# Patient Record
Sex: FEMALE | Race: WHITE | NOT HISPANIC OR LATINO | URBAN - METROPOLITAN AREA
[De-identification: names, ages, dates, MRNs, and addresses within clinical notes are randomized per-mention and may not be internally consistent; named-entity substitution may affect disease eponyms.]

---

## 2021-04-27 ENCOUNTER — OUTPATIENT (OUTPATIENT)
Dept: OUTPATIENT SERVICES | Facility: HOSPITAL | Age: 71
LOS: 1 days | Discharge: HOME | End: 2021-04-27
Payer: MEDICARE

## 2021-04-27 DIAGNOSIS — R07.9 CHEST PAIN, UNSPECIFIED: ICD-10-CM

## 2021-04-27 PROCEDURE — 78452 HT MUSCLE IMAGE SPECT MULT: CPT | Mod: 26

## 2021-06-17 ENCOUNTER — OUTPATIENT (OUTPATIENT)
Dept: OUTPATIENT SERVICES | Facility: HOSPITAL | Age: 71
LOS: 1 days | Discharge: HOME | End: 2021-06-17
Payer: MEDICARE

## 2021-06-17 DIAGNOSIS — N63.10 UNSPECIFIED LUMP IN THE RIGHT BREAST, UNSPECIFIED QUADRANT: ICD-10-CM

## 2021-06-17 DIAGNOSIS — N63.20 UNSPECIFIED LUMP IN THE LEFT BREAST, UNSPECIFIED QUADRANT: ICD-10-CM

## 2021-06-17 PROCEDURE — G0279: CPT | Mod: 26

## 2021-06-17 PROCEDURE — 77066 DX MAMMO INCL CAD BI: CPT | Mod: 26

## 2021-06-17 PROCEDURE — 76641 ULTRASOUND BREAST COMPLETE: CPT | Mod: 26,50

## 2021-07-21 ENCOUNTER — RESULT REVIEW (OUTPATIENT)
Age: 71
End: 2021-07-21

## 2021-07-21 ENCOUNTER — OUTPATIENT (OUTPATIENT)
Dept: OUTPATIENT SERVICES | Facility: HOSPITAL | Age: 71
LOS: 1 days | Discharge: HOME | End: 2021-07-21
Payer: COMMERCIAL

## 2021-07-21 DIAGNOSIS — D05.11 INTRADUCTAL CARCINOMA IN SITU OF RIGHT BREAST: ICD-10-CM

## 2021-07-21 DIAGNOSIS — N60.01 SOLITARY CYST OF RIGHT BREAST: ICD-10-CM

## 2021-07-21 PROCEDURE — 88360 TUMOR IMMUNOHISTOCHEM/MANUAL: CPT | Mod: 26

## 2021-07-21 PROCEDURE — 19084 BX BREAST ADD LESION US IMAG: CPT | Mod: 59,RT

## 2021-07-21 PROCEDURE — 88305 TISSUE EXAM BY PATHOLOGIST: CPT | Mod: 26

## 2021-07-21 PROCEDURE — 19083 BX BREAST 1ST LESION US IMAG: CPT | Mod: RT

## 2021-07-21 PROCEDURE — 77065 DX MAMMO INCL CAD UNI: CPT | Mod: 26,RT

## 2021-07-22 LAB — SURGICAL PATHOLOGY STUDY: SIGNIFICANT CHANGE UP

## 2021-07-23 DIAGNOSIS — N63.10 UNSPECIFIED LUMP IN THE RIGHT BREAST, UNSPECIFIED QUADRANT: ICD-10-CM

## 2021-08-02 ENCOUNTER — APPOINTMENT (OUTPATIENT)
Dept: BREAST CENTER | Facility: CLINIC | Age: 71
End: 2021-08-02
Payer: MEDICARE

## 2021-08-02 VITALS
HEIGHT: 62 IN | SYSTOLIC BLOOD PRESSURE: 140 MMHG | WEIGHT: 124 LBS | TEMPERATURE: 97.2 F | DIASTOLIC BLOOD PRESSURE: 86 MMHG | BODY MASS INDEX: 22.82 KG/M2

## 2021-08-02 DIAGNOSIS — Z86.79 PERSONAL HISTORY OF OTHER DISEASES OF THE CIRCULATORY SYSTEM: ICD-10-CM

## 2021-08-02 DIAGNOSIS — Z80.3 FAMILY HISTORY OF MALIGNANT NEOPLASM OF BREAST: ICD-10-CM

## 2021-08-02 PROCEDURE — 99204 OFFICE O/P NEW MOD 45 MIN: CPT

## 2021-08-03 PROBLEM — Z86.79 HISTORY OF HEART VALVE ABNORMALITY: Status: RESOLVED | Noted: 2021-08-03 | Resolved: 2021-08-03

## 2021-08-03 PROBLEM — Z86.79 HISTORY OF HYPERTENSION: Status: RESOLVED | Noted: 2021-08-03 | Resolved: 2021-08-03

## 2021-08-03 PROBLEM — Z80.3 FAMILY HISTORY OF BREAST CANCER: Status: ACTIVE | Noted: 2021-08-03

## 2021-08-03 NOTE — PAST MEDICAL HISTORY
[Menarche Age ____] : age at menarche was [unfilled] [Menopause Age____] : age at menopause was [unfilled] [Total Preg ___] : G[unfilled] [Live Births ___] : P[unfilled]  [Age At Live Birth ___] : Age at live birth: [unfilled] [History of Hormone Replacement Treatment] : has no history of hormone replacement treatment [FreeTextEntry5] : s/p hysterectomy and unilateral oophorectomy in 1988  [FreeTextEntry6] : denies [FreeTextEntry8] : denies  [FreeTextEntry7] : denies

## 2021-08-03 NOTE — ASSESSMENT
[FreeTextEntry1] : JOSE FRANCISCO THOMAS is a 70 y/o post menopausal F who presents with a self palpated RIGHT breast cancer, [id number], ER/ND pos, Her 2 neg, stage IIB AJCC 8th edition. \par \par On exam, in her right breast, she had a 3 cm mass in the UOQ, no overlying skin changes; resolving ecchymoses from her recent biopsies, no other suspicious breast masses palpated; lymphadenopathy palpated, a 2 cm lymph node that was mobile; no suspicious abnormalities were noted within the left breast. \par \par Her most recent imaging was a b/l dx mammogram and US on 6/17/2021 which revealed the following RIGHT breast findings: \par - In the axilla, @10-11N12, cortically thickened axillary lymph node measuring 2.2 cm --> METASTATIC CARCINOMA\par -C/W palpable concern @10-11N5-8, spiculated mass with associated coarse calcifications, measures 2.7 x 1.2 x 1.8 cm --> IDC\par -@11N10, circumscribed hypoechoic mass, measures 1.3 x 1.2 x 0.4 cm, --> DILATED DUCT\par -@6N5, circumscribed hypoechoic mass measuring 0.3 x 0.3 x 0.2 cm\par -@6N4, circumscribed hypoechoic mass measuring 0.6 x 0.6 x 0.2 cm --> hyalinized fibroadenoma\par \par We discussed fibroadenomas.  These are benign lesions without any malignant potential.  They are hormonally influenced and can increase or decrease in size and can also regress spontaneously.  They are considered proliferative lesions without atypia.  Patients with these lesions have been found to have a slightly increased relative risk of breast cancer compared to the reference population.  Surgical excision is recommended when the diagnosis in unclear, the lesion is causing pain or breast deformity, or if it is rapidly enlarging. \par \par She is currently asymptomatic from her right breast fibroadenoma, so no intervention is indicated for this. \par \par We discussed her new diagnosis of stage IIB RIGHT breast invasive ductal carcinoma (IDC). \par \par To further delineate the extent of her disease, I would like to obtain a bilateral breast MRI.  \par \par She may need systemic chemotherapy given the size of the tumor and positive lymph node.  After speaking with the medical oncologist, we will have her biopsy specimen sent for an ONCOTYPE to determine if she would benefit from chemotherapy.  She will be referred to medical oncology after her studies are completed. \par \par We discussed the option of giving chemotherapy before or after surgery.  If given before surgery, this is considered neoadjuvant chemotherapy.  The benefits of undergoing neoadjuvant chemotherapy is that it will provide us with better local control of her breast cancer especially if she has a good pathologic response.  If she has a complete clinical response in her breast and lymph nodes, we may be able to avoid an axillary lymph node dissection which would reduce the morbidity of her surgery.  In addition, it gives us prognostic information regarding her tumor response to chemotherapy.  Patients who have a complete pathologic response (pCR) were found to have an improved prognosis compared to women who do not have a pCR.  \par \par However, prior to starting chemotherapy, and given the locally advanced nature of her disease, I would also like to obtain staging scans with a PET/CT. \par \par In regards to her treatment options: \par -If she were to pursue surgery upfront, she would need a right lumpectomy, removal of axillary lymph node with rf tag, sentinel LN biopsy, possible ALND.  However, this may be revisited following her breast MRI and following neoadjuvant chemotherapy.\par \par In regards to radiation therapy, depending on her margins, involvement of her pectoralis muscle, and number of lymph nodes positive for disease, she may need post mastectomy radiation. If she proceeded with a lumpectomy, she would need R WBI. \par \par At the completion of all these therapy, she will need endocrine therapy, likely with an AI as she is post menopausal, which will reduce her rate of recurrence by about 50% to 2/3rds.\par \par Per [facility] consensus guidelines, any patient with a diagnosis of breast cancer should be offered panel genetic testing as 10% of these patients were found to have a mutation.  THis was offered to her and she would like to proceed with panel genetic testing.  Her blood was drawn today. \par \par All of her questions were answered.  She knows to call with any further questions or concerns. \par \par PLAN: \par -SOZO: -4.4\par -INVITAE Panel genetic testing -- blood drawn\par -ONCOTYPE on biopsy specimen (Per Dr. Tejada)\par -BREAST MRI \par -PET/CT \par -med onc referral \par -f/up after these studies are completed

## 2021-08-03 NOTE — PHYSICAL EXAM
[No dominant masses] : no dominant masses left breast [No Nipple Retraction] : no left nipple retraction [No Nipple Discharge] : no left nipple discharge [No Axillary Lymphadenopathy] : no left axillary lymphadenopathy [Normocephalic] : normocephalic [Atraumatic] : atraumatic [EOMI] : extra ocular movement intact [No Supraclavicular Adenopathy] : no supraclavicular adenopathy [No Cervical Adenopathy] : no cervical adenopathy [Examined in the supine and seated position] : examined in the supine and seated position [Soft] : abdomen soft [Not Tender] : non-tender [No Edema] : no edema [No Rashes] : no rashes [No Ulceration] : no ulceration [de-identified] : 3 cm mass in the UOQ, no overlying skin changes; resolving ecchymoses from her recent biopsies, no other suspicious breast masses palpated; lymphadenopathy palpated, a 2 cm lymph node that was mobile  [de-identified] : no suspicious abnormalities were palpated within either breast  [de-identified] : 2 cm mobile lymph node palpated  [de-identified] : SOZO: -4.4

## 2021-08-03 NOTE — DATA REVIEWED
[FreeTextEntry1] : 06/17/2021 - B/L Dx Mammo & Sono:\par Breast composition:The breasts are heterogeneously dense, which may obscure small masses.\par \par Findings:\par \par Mammogram:\par \par In the upper outer quadrant of the right breast, there is a spiculated mass with heterogeneous calcifications corresponding to the area palpable concern in corresponds with a sonographically demonstrated mass. Medial to this mass in the posterior depth, there is an area of architectural distortion. Slightly superior to this mass, there is a an additional circumscribed mass also seen on concurrent ultrasound.\par \par No suspicious mass, microcalcifications or areas of architectural distortion is seen the left breast.\par \par Ultrasound:\par \par Bilateral whole breast ultrasound was performed.\par \par Right breast:\par In the axilla, at the 10 to 11:00 position 12 cm from the nipple, there is a cortically thickened axillary lymph node measuring 2.2 cm.\par \par Corresponding to the area palpable concern at the 10 to 11:00 position 5 to 8 cm from the nipple, there is a spiculated mass with associated coarse calcifications measuring 2.7 x 1.2 x 1.8 cm.\par \par At the 11:00 position 10 cm from the nipple, there is a circumscribed hypoechoic mass measuring 1.3 x 1.2 x 0.4 cm, corresponding to mammographic findings.\par \par At the 6:00 position 5 cm from the nipple, there is a circumscribed hypoechoic mass measuring 0.3 x 0.3 x 0.2 cm.\par \par At the 6:00 position 4 cm from the nipple, there is a circumscribed hypoechoic mass measuring 0.6 x 0.6 x 0.2 cm.\par \par Left breast:\par No suspicious solid or cystic masses. No axillary adenopathy.\par \par Impression:\par \par No mammographic or sonographic evidence of malignancy of the left breast.\par \par Dominant spiculated mass with associated calcifications in the upper outer quadrant of the right breast, suspicious. Ultrasound-guided biopsy is recommended. Right axillary adenopathy and additional indeterminate masses at the right breast 11 N10 and 6N4 for which ultrasound-guided biopsy is also recommended.\par \par Recommendation: Ultrasound guided biopsy.\par \par BI-RADS Category 5: Highly Suggestive of Malignancy\par \par \par 07/21/2021 - US Guided Core Bx:\par 1. 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 Right, 11:00 N10, 1.3cm: (mini-cork)\par - Benign atrophic fatty breast tissue with a dominant macrocyst\par wall fragment demonstrating an attenuated epithelial lining;\par consistent with a dilated duct.\par Findings are benign concordant.\par \par 3. Right, 6:00 N4, 0.6cm: \par - Hyalinized fibroadenoma.\par Findings are benign concordant.\par \par 4. Right, axillary mass 10-12:00 N12, 2.2cm: (twirl)\par - Lymph node fragments containing metastatic carcinoma (1/1),\par the largest contiguous focus of which measures 6.0 mm\par (microscopic measurement).\par - No extracapsular extension is identified in this biopsy material.\par Findings are malignant concordant.\par

## 2021-08-03 NOTE — HISTORY OF PRESENT ILLNESS
[FreeTextEntry1] : JOSE FRANCISCO THOMAS is a 72 y/o post menopausal F who presents with a self palpated RIGHT breast cancer, [id number], ER/WY pos, Her 2 neg, stage IIB AJCC 8th edition. \par \par Of note, her daughter, Sierra was present for the entirety of this consultation. \par \par She first palpated a mass in the R UOQ, for the past year.  She has noticed worsening pain in that area and does think that it has increased in size since she first felt it.  She denies any left sided breast complaints and denies any nipple discharge or retraction. \par \par Her work up was as follows:\par 2021 - B/L Dx Mammo & Sono:\par -breasts are heterogeneously dense\par -R: UOQ, spiculated mass, with calcifications, c/w US finding below \par -R: Medial to this mass in the posterior depth, an area of architectural distortion. \par -R: Slightly superior to this mass, there is a an additional circumscribed mass also seen on concurrent ultrasound.\par Right breast:\par - In the axilla, at the 10 to 11:00 position 12 cm from the nipple, there is a cortically thickened axillary lymph node measuring 2.2 cm --> BIOPSY \par -Corresponding to the area palpable concern at the 10 to 11:00 position 5 to 8 cm from the nipple, there is a spiculated mass with associated coarse calcifications measuring 2.7 x 1.2 x 1.8 cm --> BIOPSY \par -At the 11:00 position 10 cm from the nipple, there is a circumscribed hypoechoic mass measuring 1.3 x 1.2 x 0.4 cm, corresponding to mammographic findings --> BIOPSY \par -At the 6:00 position 5 cm from the nipple, there is a circumscribed hypoechoic mass measuring 0.3 x 0.3 x 0.2 cm.\par -At the 6:00 position 4 cm from the nipple, there is a circumscribed hypoechoic mass measuring 0.6 x 0.6 x 0.2 cm --> BIOPSY \par Left breast:\par No suspicious solid or cystic masses. No axillary adenopathy.\par BI-RADS Category 5: Highly Suggestive of Malignancy\par \par \par 2021 - US Guided Core Bx:\par 1. Right, 10-11:00 N5-8, 2.7cm: (top-hat)\par - Invasive moderately differentiated ductal carcinoma with dominant micropapillary features.\par - Ductal carcinoma in-situ (DCIS), cribriform type with comedo necrosis and microcalcifications, intermediate nuclear grade.\par - Foci of lymphovascular invasion are present.\par -ER  (+)  99%\par -WY  (+)  15%\par -HER2  (-) on IHC\par -Ki-67  20%\par \par 2. Right, 11:00 N10, 1.3cm: (mini-cork)\par - Benign atrophic fatty breast tissue with a dominant macrocyst\par wall fragment demonstrating an attenuated epithelial lining;\par consistent with a dilated duct.\par Findings are benign concordant.\par \par 3. Right, 6:00 N4, 0.6cm: (stoplight clip) \par - Hyalinized fibroadenoma.\par Findings are benign concordant.\par \par 4. Right, axillary mass 10-12:00 N12, 2.2cm: (twirl)\par - Lymph node fragments containing metastatic carcinoma (),\par the largest contiguous focus of which measures 6.0 mm\par (microscopic measurement).\par - No extracapsular extension is identified in this biopsy material.\par Findings are malignant concordant.\par \par HISTORICAL RISK FACTORS: \par -no prior breast biopsies, hx of lumpectomy for reportedly benign disease  \par -family history of breast cancer in a paternal aunt at age 45 \par -, age at first live birth was 24 \par -no prior OCP use \par -s/p DAVIE, unilateral oophorectomy in  \par

## 2021-08-03 NOTE — REASON FOR VISIT
[Initial Evaluation] : an initial evaluation [FreeTextEntry1] : right breast cancer, [id number], ER/FL pos, her 2 neg stage IIB

## 2021-08-16 ENCOUNTER — OUTPATIENT (OUTPATIENT)
Dept: OUTPATIENT SERVICES | Facility: HOSPITAL | Age: 71
LOS: 1 days | Discharge: HOME | End: 2021-08-16
Payer: COMMERCIAL

## 2021-08-16 ENCOUNTER — NON-APPOINTMENT (OUTPATIENT)
Age: 71
End: 2021-08-16

## 2021-08-16 ENCOUNTER — RESULT REVIEW (OUTPATIENT)
Age: 71
End: 2021-08-16

## 2021-08-16 DIAGNOSIS — C50.911 MALIGNANT NEOPLASM OF UNSPECIFIED SITE OF RIGHT FEMALE BREAST: ICD-10-CM

## 2021-08-16 LAB — GLUCOSE BLDC GLUCOMTR-MCNC: 85 MG/DL — SIGNIFICANT CHANGE UP (ref 70–99)

## 2021-08-16 PROCEDURE — 78815 PET IMAGE W/CT SKULL-THIGH: CPT | Mod: 26,PI

## 2021-08-17 ENCOUNTER — NON-APPOINTMENT (OUTPATIENT)
Age: 71
End: 2021-08-17

## 2021-08-18 ENCOUNTER — RESULT REVIEW (OUTPATIENT)
Age: 71
End: 2021-08-18

## 2021-08-18 ENCOUNTER — OUTPATIENT (OUTPATIENT)
Dept: OUTPATIENT SERVICES | Facility: HOSPITAL | Age: 71
LOS: 1 days | Discharge: HOME | End: 2021-08-18
Payer: COMMERCIAL

## 2021-08-18 DIAGNOSIS — C50.911 MALIGNANT NEOPLASM OF UNSPECIFIED SITE OF RIGHT FEMALE BREAST: ICD-10-CM

## 2021-08-18 PROCEDURE — 77049 MRI BREAST C-+ W/CAD BI: CPT | Mod: 26

## 2021-08-20 ENCOUNTER — APPOINTMENT (OUTPATIENT)
Dept: HEMATOLOGY ONCOLOGY | Facility: CLINIC | Age: 71
End: 2021-08-20
Payer: MEDICARE

## 2021-08-20 VITALS
SYSTOLIC BLOOD PRESSURE: 153 MMHG | BODY MASS INDEX: 25.13 KG/M2 | HEART RATE: 62 BPM | TEMPERATURE: 98.3 F | DIASTOLIC BLOOD PRESSURE: 79 MMHG | HEIGHT: 60 IN | WEIGHT: 128 LBS

## 2021-08-20 PROCEDURE — 99205 OFFICE O/P NEW HI 60 MIN: CPT

## 2021-08-23 ENCOUNTER — NON-APPOINTMENT (OUTPATIENT)
Age: 71
End: 2021-08-23

## 2021-08-30 NOTE — CONSULT LETTER
[Dear  ___] : Dear  [unfilled], [Consult Letter:] : I had the pleasure of evaluating your patient, [unfilled]. [Please see my note below.] : Please see my note below. [Consult Closing:] : Thank you very much for allowing me to participate in the care of this patient.  If you have any questions, please do not hesitate to contact me. [Sincerely,] : Sincerely, [FreeTextEntry3] : Keyla Tejada MD [DrPatrick  ___] : Dr. EAST

## 2021-08-30 NOTE — ASSESSMENT
[FreeTextEntry1] : 72 yo female has IDC of the right breast with 3 cm mass and positive right axillary lymph node, ER/AR positive, Her-2 negative, s/p biopsy. \par Clinical stage is T3N1.\par Oncotype RS is 27. \par \par Recommendations:\par We had a detailed discussion regarding to her diagnosis, clinical stage, prognosis and treatment options. We reviewed biopsy and imaging study reports. Frida has IDC of the right breast with > 3 cm mass and positive right axillary lymph node, ER/AR positive, Her-2 negative. Her staging PET/CT showed 3.6 cm FDG avid right breast mass and 2 cm FDG avid right axillary lymph node. There is no evidence of distant metastasis. Her biopsy specimen was sent for Oncotype Dx assay. Her RS is 27 which predicts benefit from adjuvant chemotherapy. \par \par In light of hormone receptor positive breast cancer with positive axillary lymph node and high risk recurrence score, she is indicated for adjuvant chemotherapy and endocrine therapy. She may have chemotherapy prior to surgery which will help down stage tumor, improve surgical outcome and enhance breast conservation.  She may avoid axillary lymph node dissection if she has good response to chemotherapy, targeted lymph node and SLN are negative for cancer at the time of her surgery. \par \par We discussed the chemotherapy regimen. Dose dense AC every 2 weeks x 4 followed by Taxol weekly x 12 is recommended. Neulasta injection is needed after dose dense chemotherapy to prevent neutropenia. We reviewed potential side effects and toxicities from the chemotherapy. Adriamycin is associated with cardiac toxicity, may cause cardiomyopathy and decreased heart function. It has a small risk of developing leukemia. The chemotherapy can cause bone marrow suppression, cytopenia, increase risk of infection, nausea, vomiting, diarrhea, fatigue, alopecia, infusional reaction, and neuropathy.\par \par She will need to have 2D Echo to evaluate LVEF prior to chemo. We also discussed to have port catheter placement for chemotherapy.\par \par She will be evaluated for treatment response clinically prior to each cycle of chemotherapy. If she responds appropriately, she will finish all scheduled treatment and will be referred for repeat imaging study at the completion of treatment.\par \par We also discussed adjuvant endocrine therapy. In light of hormone receptor positive breast cancer, she will benefit from adjuvant endocrine therapy with an aromatase inhibitor. Letrozole is recommended. She will start endocrine therapy after chemotherapy. The benefit ans side effects were reviewed briefly. Will discuss with her in more detail.\par \par After informed discussion, Frida is thinking to have mastectomy. She wants to proceed with surgery first and have chemotherapy after surgery. She understands that she will need to have right axillary lymph node dissection which involves more tissue remove and increased risk of lymphedema. \par \par All questions were answered. She will followup with Dr. Ferguson for breast surgery and come back to discuss adjuvant systemic therapy after she recovers from surgery. \par \par

## 2021-08-30 NOTE — HISTORY OF PRESENT ILLNESS
[de-identified] : JOSE FRANCISCO THOMAS is a 71 year old female PMHx of cervical cancer s/p hysterectomy and unilateral salpingo-oophorectomy,  HTN presents for newly diagnosed invasive breast cancer. \par \par Patient first palpated a mass in the RUOQ herself within the past year. She had pain at the site and feels that it is growing. She went for b/l diagnostic mammo and sonogram on 6/17/2021. Her left breast had no suspicious findings however on her right breast a spiculated mass in the RUOQ as well as a second mass superior to with a thickened axillary 2.2 cm axillary node was seen. \par -breasts are heterogeneously dense\par -R: UOQ, spiculated mass, with calcifications, c/w US finding below \par -R: Medial to this mass in the posterior depth, an area of architectural distortion. \par -R: Slightly superior to this mass, there is a an additional circumscribed mass also seen on concurrent ultrasound.\par Right breast:\par - In the axilla, at the 10 to 11:00 position 12 cm from the nipple, there is a cortically thickened axillary lymph node measuring 2.2 cm --> BIOPSY \par -Corresponding to the area palpable concern at the 10 to 11:00 position 5 to 8 cm from the nipple, there is a spiculated mass with associated coarse calcifications measuring 2.7 x 1.2 x 1.8 cm --> BIOPSY \par -At the 11:00 position 10 cm from the nipple, there is a circumscribed hypoechoic mass measuring 1.3 x 1.2 x 0.4 cm, corresponding to mammographic findings --> BIOPSY \par -At the 6:00 position 5 cm from the nipple, there is a circumscribed hypoechoic mass measuring 0.3 x 0.3 x 0.2 cm.\par -At the 6:00 position 4 cm from the nipple, there is a circumscribed hypoechoic mass measuring 0.6 x 0.6 x 0.2 cm --> BIOPSY \par BI-RADS Category 5: Highly Suggestive of Malignancy\par \par On 7/21/2021, she went for US guided biopsy of  the 2cm mass which showed invasive moderately differential ductal carcinoma, with dominant micrpapillary features\par 1. Breast, right 10-11 N5-8 mass, ultrasound guided needle core\par biopsies:\par - Invasive moderately differentiated ductal carcinoma with dominant micropapillary features, ER 99%, ID 15%. Ki67 20%, Her 2 negative (IHC 1+, Her2/CEP17 1.9)\par - Ductal carcinoma in-situ (DCIS), cribriform type with comedo\par necrosis and microcalcifications, intermediate nuclear grade.\par - Foci of lymphovascular invasion are present.\par \par 2. Breast, right 11 N10 mass, ultrasound guided needle core biopsies:\par - Benign atrophic fatty breast tissue with a dominant macrocyst wall fragment demonstrating an attenuated epithelial lining; consistent with a dilated duct.\par \par 3. Breast, right 6 N4 mass, ultrasound guided needle core biopsies:\par - Hyalinized fibroadenoma.\par \par 4. Breast, right axillary mass, ultrasound guided needle core biopsies:\par - Lymph node fragments containing metastatic carcinoma (1/1), the largest contiguous focus of which measures 6.0 mm (microscopic measurement).\par - No extracapsular extension is identified in this biopsy material.\par \par On 8/16/21, she had a PET/CT which showed pathologic FDG uptake (SUV 7) coregistering with 3.6 x 2 cm right breast mass and pathologic FDG uptake in 2 cm right axillary nodule (SUV 6.1) consistent with documented biologic tumor activity. No other sites of abnormal FDG uptake\par \par On 8/18/21, she had b/l breast MRI MRI which showed the biopsy proven right breast carcinoma 1.9 x 3.7 x 3.5 cm and 2 cm right axillary adenopathy. No additional suspicious enhancement in either breast. \par \par She saw Dr. Ferguson for surgical consultation. She was given options to have neoadjuvant chemo followed by surgery or immediate surgery. \par \par Her family history is significant for breast cancer in her paternal aunt in her 40s,  2 uncles who had "brain cancer"; and her mom who had cervical cancer. \par

## 2021-08-30 NOTE — PHYSICAL EXAM
[Restricted in physically strenuous activity but ambulatory and able to carry out work of a light or sedentary nature] : Status 1- Restricted in physically strenuous activity but ambulatory and able to carry out work of a light or sedentary nature, e.g., light house work, office work [Normal] : affect appropriate [de-identified] : There is 3 cm mass in RUOQ and a palpable right  axillary adenopathy. There is nor palapble abnormality in the left breast and left axilla.

## 2021-09-03 ENCOUNTER — APPOINTMENT (OUTPATIENT)
Dept: PLASTIC SURGERY | Facility: CLINIC | Age: 71
End: 2021-09-03
Payer: MEDICARE

## 2021-09-03 DIAGNOSIS — Z85.41 PERSONAL HISTORY OF MALIGNANT NEOPLASM OF CERVIX UTERI: ICD-10-CM

## 2021-09-03 PROCEDURE — 99203 OFFICE O/P NEW LOW 30 MIN: CPT

## 2021-09-03 NOTE — ASSESSMENT
[FreeTextEntry1] : 72 y/o F with newly diagnosed right breast IDC/DCIS and +axillary LN who has elected to undergo bilateral mastectomy\par \par She is a good candidate for implant-based reconstruction with direct to silicone implant, possible placement of tissue expanders and Alloderm \par \par The patient was counseled about reconstructive options following mastectomy, including autologous, prosthetic, and the options of foregoing reconstruction. Additionally, she was explained the options regarding the timing of reconstruction, including immediate reconstruction at the time of mastectomy or delayed reconstruction at a later date. \par \par The patient was extensively counseled on the operation and the perioperative recoveries of both autologous and prosthetic reconstructions. The patient understands the likely position of scars and the use of surgical drains. The patient understands the risks with abdominally based microsurgical reconstruction including partial or total flap loss, revisit to the operating room in the tawana-operative period, wound dehiscence, mastectomy skin flap necrosis, fat necrosis, abdominal wall morbidity (laxity, bulge, hernia), asymmetry, paresthesia, seroma, hematoma, and infection. She also understands the risks with implant-based reconstruction include but not limited to hematoma, seroma, infection, implant malposition, extrusion, deflation, capsular contracture, mastectomy skin flap necrosis, wound dehiscence, asymmetry, paresthesia, small risk of breast-implant associated lymphoma,  likelihood of requiring additional procedures related to the implant over the course of her lifetime, possible need for additional surgery including revision and/or autologous tissue reconstruction (e.g. pedicled latissimus dorsi flap, TDAP flap, etc).  She was also informed on the off-label use of biologic mesh, its benefits, risks and alternatives. She was given the opportunity to ask questions; and all were answered to her satisfaction at this time.\par \par She is a good candidate for prosthetic breast reconstruction. After a long discussion she has decided to proceed with direct-to-silicone implant; possible tissue expander with mesh, followed by silicone implant at a later date. \par \par The patient understands all of these risks and she provides informed consent.\par \par Photos were taken with patient permission.\par \par Her surgery date with be coordinated by Breast Surgery and Pl[facility] Surgery.\par \par Due to COVID-19, pre-visit patient instructions were explained to the patient and their symptoms were checked upon arrival. Masks were used by the healthcare provider and staff and the examination room was cleaned after the patient visit concluded

## 2021-09-03 NOTE — HISTORY OF PRESENT ILLNESS
[FreeTextEntry1] : Pt is a 72 y/o post menopausal F, , with PMH of scoliosis, HTN, Cervical CA s/p hysterectomy, and newly diagnosed RIGHT breast cancer who presents for breast reconstruction consultation. Pt states she self-palpated a lesion in February and underwent mammogram/US followed by biopsy confirming IDC and DCIS as well as right axillary +LN. Had not had a mammogram since age 32. Denies nipple bleeding, discharge, or inversion. She has decided to pursue bilateral mastectomy.   Dr. Ferguson recommend [facility].\par \par Per Dr. Tejada, will need either adjuvant or neoadjuvant chemotherapy. Pt has elected to do adjuvant chemotherapy.\par Pt states she would like to least amount of surgery possible for her reconstruction.\par \par H/o right lumpectomy x2 1996: benign nodules\par +family h/o breast cancer: paternal aunt, dx in her 40s\par \par Ht 5' Wt 128 lb  \par Current bra size: 34B, happy at this size\par Denies h/o VTE or MRSA infections\par Occ: Works at [employer]\par Here today with her daughter Sierra

## 2021-09-03 NOTE — PHYSICAL EXAM
[de-identified] : well-appearing, NAD [de-identified] : EOMI [de-identified] : supple [de-identified] : nonlabored breathing [de-identified] : scoliosis with greater prominence on left side [de-identified] : Bilateral breasts symmetrical and Grade II ptosis, volume right greater(10-20 g) than left breast; bilateral superior pole deflation, moderate skin tone age apprpriate\par Left breast: no palpable masses, nipple retraction or discharge.\par Right breast:~4 cm palpable tender mass at 10 o'clock; no nipple retraction or discharge.\par Axilla: right clinically palpable lymph adenopathy\par Palpable LD muscle with moderate soft tissue bulk, bilateral\par \par Breast measurements (standing; (cm)):\par Regnault ptosis grade: 2\par R SN-Nipple: 24\par R Nipple-IMF: 8.5\par R Base width: 14\par R Nipple - midsternum: 8.5\par R NAC diameter: 3.8\par \par IMF position asymmetrical, left 1 cm lower than right breast\par \par L SN-Nipple: 24.5\par L Nipple-IMF: 10\par L Base width: 14\par L Nipple - midsternum: 11\par L NAC diameter: 4.1 [de-identified] : soft, nontender, nondistended; small deflated pannus (grade 1), well-healed hysterectomy scar

## 2021-09-09 ENCOUNTER — APPOINTMENT (OUTPATIENT)
Dept: BREAST CENTER | Facility: CLINIC | Age: 71
End: 2021-09-09
Payer: MEDICARE

## 2021-09-09 PROCEDURE — 99214 OFFICE O/P EST MOD 30 MIN: CPT

## 2021-09-10 NOTE — DATA REVIEWED
[FreeTextEntry1] : 06/17/2021 - B/L Dx Mammo & Sono:\par Breast composition:The breasts are heterogeneously dense, which may obscure small masses.\par \par Findings:\par \par Mammogram:\par \par In the upper outer quadrant of the right breast, there is a spiculated mass with heterogeneous calcifications corresponding to the area palpable concern in corresponds with a sonographically demonstrated mass. Medial to this mass in the posterior depth, there is an area of architectural distortion. Slightly superior to this mass, there is a an additional circumscribed mass also seen on concurrent ultrasound.\par \par No suspicious mass, microcalcifications or areas of architectural distortion is seen the left breast.\par \par Ultrasound:\par \par Bilateral whole breast ultrasound was performed.\par \par Right breast:\par In the axilla, at the 10 to 11:00 position 12 cm from the nipple, there is a cortically thickened axillary lymph node measuring 2.2 cm.\par \par Corresponding to the area palpable concern at the 10 to 11:00 position 5 to 8 cm from the nipple, there is a spiculated mass with associated coarse calcifications measuring 2.7 x 1.2 x 1.8 cm.\par \par At the 11:00 position 10 cm from the nipple, there is a circumscribed hypoechoic mass measuring 1.3 x 1.2 x 0.4 cm, corresponding to mammographic findings.\par \par At the 6:00 position 5 cm from the nipple, there is a circumscribed hypoechoic mass measuring 0.3 x 0.3 x 0.2 cm.\par \par At the 6:00 position 4 cm from the nipple, there is a circumscribed hypoechoic mass measuring 0.6 x 0.6 x 0.2 cm.\par \par Left breast:\par No suspicious solid or cystic masses. No axillary adenopathy.\par \par Impression:\par \par No mammographic or sonographic evidence of malignancy of the left breast.\par \par Dominant spiculated mass with associated calcifications in the upper outer quadrant of the right breast, suspicious. Ultrasound-guided biopsy is recommended. Right axillary adenopathy and additional indeterminate masses at the right breast 11 N10 and 6N4 for which ultrasound-guided biopsy is also recommended.\par \par Recommendation: Ultrasound guided biopsy.\par \par BI-RADS Category 5: Highly Suggestive of Malignancy\par \par \par 07/21/2021 - US Guided Core Bx:\par 1. Right, 10-11:00 N5-8, 2.7cm: (top-hat)\par - Invasive moderately differentiated ductal carcinoma with\par dominant micropapillary features.\par - Ductal carcinoma in-situ (DCIS), cribriform type with comedo\par necrosis and microcalcifications, intermediate nuclear grade.\par - Foci of lymphovascular invasion are present.\par ER  (+)  99%\par NM  (+)  15%\par HER2  (-)  \par Ki-67  20%\par Findings are malignant concordant.\par \par 2. Right, 11:00 N10, 1.3cm: (mini-cork)\par - Benign atrophic fatty breast tissue with a dominant macrocyst\par wall fragment demonstrating an attenuated epithelial lining;\par consistent with a dilated duct.\par Findings are benign concordant.\par \par 3. Right, 6:00 N4, 0.6cm: \par - Hyalinized fibroadenoma.\par Findings are benign concordant.\par \par 4. Right, axillary mass 10-12:00 N12, 2.2cm: (twirl)\par - Lymph node fragments containing metastatic carcinoma (1/1),\par the largest contiguous focus of which measures 6.0 mm\par (microscopic measurement).\par - No extracapsular extension is identified in this biopsy material.\par Findings are malignant concordant.\par \par EXAM:  MR BREAST WAW IC BI\par \par \par PROCEDURE DATE:  08/18/2021\par \par \par \par \par INTERPRETATION:  Clinical History / Reason for exam: Biopsy proven right breast cancer and right axillary metastasis.\par \par Technique: Breast MRI is performed at 1.5 T with the patient prone and the breasts in a dedicated breast coil. Following a 3 plane localizer, sagittal T1 weighted, fat-saturated T1 weighted and fat saturated T2-weighted sequence; dynamic contrast enhanced sagittal images; and delayed post-contrast axial fat-saturated T1 weighted images were obtained. 6.5 mL gadolinium contrast was injected and 1 mL was discarded. Subtraction and MIP images were reviewed.  [employer] software was used in interpretation.\par \par Comparison: Priors dated 6/17/2021. No prior MRIs for direct comparison.\par \par Findings:\par \par Amount of fibroglandular tissue: Heterogeneous fibroglandular tissue\par \par Background parenchymal enhancement: Mild, Symmetric\par \par RIGHT BREAST:\par In the upper outer quadrant, posterior depth, there is redemonstration of a 1.9 x 3.7 x 3.5 cm enhancing mass with a biopsy marker consistent with the biopsy-proven carcinoma. Additional biopsy markers in the superior and inferior breast denoting prior benign biopsies. No additional suspicious enhancing mass or area of enhancement is identified.\par \par The nipple and skin appear normal.\par Biopsy-proven right axillary metastatic lymph node measuring 2.0 cm.\par \par LEFT BREAST:\par No enhancing mass, architectural distortion, or suspicious area of enhancement is identified.\par \par The nipple and skin appear normal.\par There is no axillary adenopathy.\par \par The imaged portions of the chest and abdomen are unremarkable.\par \par Impression:\par \par Biopsy-proven right breast carcinoma and right axillary metastasis. No additional suspicious enhancement in either breast.\par \par Recommendation: Management as clinically appropriate.\par \par BI-RADS Category 6: Known Biopsy-Proven Malignancy\par \par --- End of Report ---\par \par \par \par \par RAMEN MIRANDA ; Attending Radiologist\par This document has been electronically signed. Aug 18 2021 10:48AM\par \par EXAM:  PETCT SKUL-THI ONC FDG INIT\par \par \par PROCEDURE DATE:  08/16/2021\par \par \par \par \par INTERPRETATION:  JOSE FRANCISCO THOMAS\par FDG PET CT STUDY, INITIAL TREATMENT STRATEGY\par REASON: TUMOR IMAGING - PET with concurrently acquired CT for attenuation correction and anatomic localization; skull base to mid - thigh / [id number] /Malignant neoplasm of upper-outer quadrant of right breast in female, estrogen receptor  positive / Breast cancer, stage 2, right, initial strategy\par \par \par HISTORY: This is a 71-year-old patient with newly diagnosed right invasive ductal carcinoma of the breast, estrogen receptor positive\par post menopausal F who presents with a self palpated RIGHT breast cancer, [id number], ER/NM pos, Her 2 neg, stage IIB AJCC 8th edition.\par On exam, in her right breast, she had a 3 cm mass in the UOQ, no overlying skin changes; resolving ecchymoses from her recent biopsies, no other suspicious breast masses palpated; lymphadenopathy palpated, a 2 cm lymph node that was mobile; no suspicious abnormalities were noted within the left breast.\par Pathology showed invasive ductal carcinoma; right axillary mass consistent with metastatic carcinoma\par Blood glucose pre injection 85 mg/dL\par TECHNIQUE: Approximately 45 minutes after the intravenous administration of 10.6 mCi 18-Fluorine FDG, whole body PET images were acquired from base of skull to mid - thigh. In addition, non-contrast, low dose, non - diagnostic CT was acquired for attenuation correction and anatomic correlation purposes only.\par These images reveal pathologic FDG uptake (SUV 7) coregistering with 3.6 x 2 cm right breast mass and pathologic FDG uptake in 2 x 1.1 cm right axillary nodule (SUV 6.1) consistent with documented biologic tumor activity.\par No other sites of abnormal FDG uptake\par \par IMPRESSION:\par Pathologic FDG uptake (SUV 7) coregistering with 3.6 x 2 cm right breast mass and pathologic FDG uptake in 2 cm right axillary nodule (SUV 6.1) consistent with documented biologic tumor activity.\par \par No other sites of abnormal FDG uptake\par \par NUNO BAUTISTA D.O., M.M.M, C.P.E.\par \par \par \par Spoke with Maya Ferguson MD               on 8/16/2021 8:53 AM with readback.\par \par --- End of Report ---\par \par \par \par \par NUNO BAUTISTA DO; Attending Nuclear Medicine\par This document has been electronically signed. Aug 16 2021  8:53AM\par

## 2021-09-10 NOTE — PHYSICAL EXAM
[Normocephalic] : normocephalic [Atraumatic] : atraumatic [EOMI] : extra ocular movement intact [No Supraclavicular Adenopathy] : no supraclavicular adenopathy [No Cervical Adenopathy] : no cervical adenopathy [Examined in the supine and seated position] : examined in the supine and seated position [No dominant masses] : no dominant masses left breast [No Nipple Retraction] : no left nipple retraction [No Nipple Discharge] : no left nipple discharge [No Axillary Lymphadenopathy] : no left axillary lymphadenopathy [Soft] : abdomen soft [Not Tender] : non-tender [No Edema] : no edema [No Rashes] : no rashes [No Ulceration] : no ulceration [de-identified] : 3 cm mass in the UOQ, no overlying skin changes; resolving ecchymoses from her recent biopsies, no other suspicious breast masses palpated; lymphadenopathy palpated, a 2 cm lymph node that was mobile  [de-identified] : no suspicious abnormalities were palpated within either breast  [de-identified] : 2 cm mobile lymph node palpated  [de-identified] : SOZO: -4.4

## 2021-09-10 NOTE — PAST MEDICAL HISTORY
[Menarche Age ____] : age at menarche was [unfilled] [Menopause Age____] : age at menopause was [unfilled] [Total Preg ___] : G[unfilled] [Live Births ___] : P[unfilled]  [Age At Live Birth ___] : Age at live birth: [unfilled] [History of Hormone Replacement Treatment] : has no history of hormone replacement treatment [FreeTextEntry5] : s/p hysterectomy and unilateral oophorectomy in 1988  [FreeTextEntry6] : denies [FreeTextEntry7] : denies [FreeTextEntry8] : denies

## 2021-09-10 NOTE — HISTORY OF PRESENT ILLNESS
[FreeTextEntry1] : JOSE FRANCISCO THOMAS is a 70 y/o post menopausal F who presents with a self palpated RIGHT breast cancer, [id number], ER/IA pos, Her 2 neg, stage IIB AJCC 8th edition. \par \par Of note, her daughter, Sierra was present for the entirety of this consultation. \par \par She first palpated a mass in the R UOQ, for the past year.  She has noticed worsening pain in that area and does think that it has increased in size since she first felt it.  She denies any left sided breast complaints and denies any nipple discharge or retraction. \par \par Her work up was as follows:\par 2021 - B/L Dx Mammo & Sono:\par -breasts are heterogeneously dense\par -R: UOQ, spiculated mass, with calcifications, c/w US finding below \par -R: Medial to this mass in the posterior depth, an area of architectural distortion. \par -R: Slightly superior to this mass, there is a an additional circumscribed mass also seen on concurrent ultrasound.\par Right breast:\par - In the axilla, at the 10 to 11:00 position 12 cm from the nipple, there is a cortically thickened axillary lymph node measuring 2.2 cm --> BIOPSY \par -Corresponding to the area palpable concern at the 10 to 11:00 position 5 to 8 cm from the nipple, there is a spiculated mass with associated coarse calcifications measuring 2.7 x 1.2 x 1.8 cm --> BIOPSY \par -At the 11:00 position 10 cm from the nipple, there is a circumscribed hypoechoic mass measuring 1.3 x 1.2 x 0.4 cm, corresponding to mammographic findings --> BIOPSY \par -At the 6:00 position 5 cm from the nipple, there is a circumscribed hypoechoic mass measuring 0.3 x 0.3 x 0.2 cm.\par -At the 6:00 position 4 cm from the nipple, there is a circumscribed hypoechoic mass measuring 0.6 x 0.6 x 0.2 cm --> BIOPSY \par Left breast:\par No suspicious solid or cystic masses. No axillary adenopathy.\par BI-RADS Category 5: Highly Suggestive of Malignancy\par \par \par 2021 - US Guided Core Bx:\par 1. Right, 10-11:00 N5-8, 2.7cm: (top-hat)\par - Invasive moderately differentiated ductal carcinoma with dominant micropapillary features.\par - Ductal carcinoma in-situ (DCIS), cribriform type with comedo necrosis and microcalcifications, intermediate nuclear grade.\par - Foci of lymphovascular invasion are present.\par -ER  (+)  99%\par -IA  (+)  15%\par -HER2  (-) on IHC\par -Ki-67  20%\par \par 2. Right, 11:00 N10, 1.3cm: (mini-cork)\par - Benign atrophic fatty breast tissue with a dominant macrocyst\par wall fragment demonstrating an attenuated epithelial lining;\par consistent with a dilated duct.\par Findings are benign concordant.\par \par 3. Right, 6:00 N4, 0.6cm: (stoplight clip) \par - Hyalinized fibroadenoma.\par Findings are benign concordant.\par \par 4. Right, axillary mass 10-12:00 N12, 2.2cm: (twirl)\par - Lymph node fragments containing metastatic carcinoma (),\par the largest contiguous focus of which measures 6.0 mm\par (microscopic measurement).\par - No extracapsular extension is identified in this biopsy material.\par Findings are malignant concordant.\par \par HISTORICAL RISK FACTORS: \par -no prior breast biopsies, hx of lumpectomy for reportedly benign disease  \par -family history of breast cancer in a paternal aunt at age 45 \par -, age at first live birth was 24 \par -no prior OCP use \par -s/p DAVIE, unilateral oophorectomy in  \par \par Bx specimen sent for Oncotype Dx = 27.\par \par INTERVAL HISTORY:\par 2021 --\par Jose Francisco returns for a surgical re discussion.\par She met with Dr. Keyla Tejada of medical oncology and discussed option of neoadjuvant chemotherapy.\par As per Dr. Tejada, after informed discussion, Jose Francisco is thinking to have mastectomy. She wants to proceed with surgery first and have chemotherapy after surgery. She understands that she will need to have right axillary lymph node dissection which involves more tissue removed and increased risk of lymphedema. \par \par She has also met with Dr. Priscilla Barrios of plastic surgery for consultation. She has elected to proceed with bilateral mastectomy.  Dr. Barrios feels she is a good candidate for prosthetic breast reconstruction. After a long discussion she has decided to proceed with direct-to-silicone implant; possible tissue expander with mesh, followed by silicone implant at a later date. \par \par SInce her last visit, she also underwent a PET/CT on 2021 and breast MRI on 2021 which did not reveal any additional areas of disease aside from her her right breast mass and metastatic right axillary lymph node.

## 2021-09-10 NOTE — ASSESSMENT
[FreeTextEntry1] : JOSE FRANCISCO THOMAS is a 72 y/o post menopausal F who presents with a self palpated RIGHT breast cancer, [id number], ER/WV pos, Her 2 neg, stage IIB AJCC 8th edition. \par \par On exam, in her right breast, she had a 3 cm mass in the UOQ, no overlying skin changes; resolving ecchymoses from her recent biopsies, no other suspicious breast masses palpated; lymphadenopathy palpated, a 2 cm lymph node that was mobile; no suspicious abnormalities were noted within the left breast. \par \par Her most recent imaging was a b/l dx mammogram and US on 6/17/2021 which revealed the following RIGHT breast findings: \par - In the axilla, @10-11N12, cortically thickened axillary lymph node measuring 2.2 cm --> METASTATIC CARCINOMA\par -C/W palpable concern @10-11N5-8, spiculated mass with associated coarse calcifications, measures 2.7 x 1.2 x 1.8 cm --> IDC\par -@11N10, circumscribed hypoechoic mass, measures 1.3 x 1.2 x 0.4 cm, --> DILATED DUCT\par -@6N5, circumscribed hypoechoic mass measuring 0.3 x 0.3 x 0.2 cm\par -@6N4, circumscribed hypoechoic mass measuring 0.6 x 0.6 x 0.2 cm --> hyalinized fibroadenoma\par \par We discussed fibroadenomas.  These are benign lesions without any malignant potential.  They are hormonally influenced and can increase or decrease in size and can also regress spontaneously.  They are considered proliferative lesions without atypia.  Patients with these lesions have been found to have a slightly increased relative risk of breast cancer compared to the reference population.  Surgical excision is recommended when the diagnosis in unclear, the lesion is causing pain or breast deformity, or if it is rapidly enlarging. \par \par She is currently asymptomatic from her right breast fibroadenoma, so no intervention is indicated for this. \par \par We discussed her new diagnosis of stage IIB RIGHT breast invasive ductal carcinoma (IDC). \par \par She may need systemic chemotherapy given the size of the tumor and positive lymph node.  After speaking with the medical oncologist, we will have her biopsy specimen sent for an ONCOTYPE to determine if she would benefit from chemotherapy. Her ONCOTYPE score was 27, and was recommended for chemotherapy with AC + T, to be given before or after surgery.  After discussion, she has decided to proceed with surgery first.  \par \par OR: RIGHT MODIFIED RADICAL MASTECTOMY, EXCISION OF RIGHT AXILLARY LYMPH NODE WITH RF LOCALIZATION; LEFT MASTECTOMY, SENTINEL LYMPH NODE MAPPING AND BIOPSY, BILATERAL PECTORAL BLOCKS, IMMEDIATE RECONSTRUCTION \par \par We discussed the option of giving chemotherapy before or after surgery.  If given before surgery, this is considered neoadjuvant chemotherapy.  The benefits of undergoing neoadjuvant chemotherapy is that it will provide us with better local control of her breast cancer especially if she has a good pathologic response.  If she has a complete clinical response in her breast and lymph nodes, we may be able to avoid an axillary lymph node dissection which would reduce the morbidity of her surgery.  In addition, it gives us prognostic information regarding her tumor response to chemotherapy.  Patients who have a complete pathologic response (pCR) were found to have an improved prognosis compared to women who do not have a pCR.  \par \par However, prior to starting chemotherapy, and given the locally advanced nature of her disease, I would also like to obtain staging scans with a PET/CT. This revealed an FDG avid R breast mass measures 3.6 x 2 cm and an FDG avid R axillary nodule, measuring 2 cm.  She did not have any other sites of abnormal FDG uptake. \par \par In regards to radiation therapy, depending on her margins, involvement of her pectoralis muscle, and number of lymph nodes positive for disease, she will need post mastectomy radiation.\par \par At the completion of all these therapy, she will need endocrine therapy, likely with an AI as she is post menopausal, which will reduce her rate of recurrence by about 50% to 2/3rds.\par \par Per [facility]BrS consensus guidelines, any patient with a diagnosis of breast cancer should be offered panel genetic testing as 10% of these patients were found to have a mutation.  THis was offered to her and she would like to proceed with panel genetic testing.  Her blood was drawn today. 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  This is not a pathogenic mutation, however, she should continue to follow up these results, as there is a possibility that this could be re-classified in the future.  She will be referred to Kaey, genetic counselor, following her surgery. \par \par All of her questions were answered.  She knows to call with any further questions or concerns. \par \par PLAN: \par -SOZO: -4.4\par -INVITAE Panel genetic testing -- VUS in DICER1, referral for genetic counselor \par -ONCOTYPE on biopsy specimen -- 27 \par -OR: RIGHT MODIFIED RADICAL MASTECTOMY, EXCISION OF RIGHT AXILLARY LYMPH NODE WITH RF LOCALIZATION; LEFT MASTECTOMY, SENTINEL LYMPH NODE MAPPING AND BIOPSY, BILATERAL PECTORAL BLOCKS, IMMEDIATE RECONSTRUCTION \par -DIAGNOSIS: RIGHT BREAST CANCER \par -would like to stay overnight

## 2021-09-10 NOTE — REASON FOR VISIT
[Follow-Up: _____] : a [unfilled] follow-up visit [FreeTextEntry1] : right breast cancer, [id number], ER/IN pos, her 2 neg stage IIB

## 2021-09-10 NOTE — REVIEW OF SYSTEMS
[As Noted in HPI] : as noted in HPI [Breast Pain] : breast pain [Breast Lump] : breast lump [Negative] : Heme/Lymph [Fever] : no fever [Chills] : no chills [Abn Vaginal Bleeding] : no unexplained vaginal bleeding [Skin Lesions] : no skin lesions [Skin Wound] : no skin wound [Hot Flashes] : no hot flashes

## 2021-09-13 ENCOUNTER — NON-APPOINTMENT (OUTPATIENT)
Age: 71
End: 2021-09-13

## 2021-09-15 ENCOUNTER — RESULT REVIEW (OUTPATIENT)
Age: 71
End: 2021-09-15

## 2021-09-15 ENCOUNTER — OUTPATIENT (OUTPATIENT)
Dept: OUTPATIENT SERVICES | Facility: HOSPITAL | Age: 71
LOS: 1 days | Discharge: HOME | End: 2021-09-15
Payer: MEDICARE

## 2021-09-15 VITALS
OXYGEN SATURATION: 100 % | HEIGHT: 60 IN | TEMPERATURE: 97 F | SYSTOLIC BLOOD PRESSURE: 129 MMHG | HEART RATE: 57 BPM | RESPIRATION RATE: 17 BRPM | WEIGHT: 128.09 LBS | DIASTOLIC BLOOD PRESSURE: 66 MMHG

## 2021-09-15 DIAGNOSIS — Z01.818 ENCOUNTER FOR OTHER PREPROCEDURAL EXAMINATION: ICD-10-CM

## 2021-09-15 DIAGNOSIS — C50.411 MALIGNANT NEOPLASM OF UPPER-OUTER QUADRANT OF RIGHT FEMALE BREAST: ICD-10-CM

## 2021-09-15 DIAGNOSIS — Z90.710 ACQUIRED ABSENCE OF BOTH CERVIX AND UTERUS: Chronic | ICD-10-CM

## 2021-09-15 DIAGNOSIS — C50.911 MALIGNANT NEOPLASM OF UNSPECIFIED SITE OF RIGHT FEMALE BREAST: ICD-10-CM

## 2021-09-15 DIAGNOSIS — Z90.49 ACQUIRED ABSENCE OF OTHER SPECIFIED PARTS OF DIGESTIVE TRACT: Chronic | ICD-10-CM

## 2021-09-15 LAB
ALBUMIN SERPL ELPH-MCNC: 5 G/DL — SIGNIFICANT CHANGE UP (ref 3.5–5.2)
ALP SERPL-CCNC: 56 U/L — SIGNIFICANT CHANGE UP (ref 30–115)
ALT FLD-CCNC: 13 U/L — SIGNIFICANT CHANGE UP (ref 0–41)
ANION GAP SERPL CALC-SCNC: 9 MMOL/L — SIGNIFICANT CHANGE UP (ref 7–14)
APPEARANCE UR: CLEAR — SIGNIFICANT CHANGE UP
APTT BLD: 32.2 SEC — SIGNIFICANT CHANGE UP (ref 27–39.2)
AST SERPL-CCNC: 21 U/L — SIGNIFICANT CHANGE UP (ref 0–41)
BASOPHILS # BLD AUTO: 0.03 K/UL — SIGNIFICANT CHANGE UP (ref 0–0.2)
BASOPHILS NFR BLD AUTO: 0.5 % — SIGNIFICANT CHANGE UP (ref 0–1)
BILIRUB SERPL-MCNC: 0.5 MG/DL — SIGNIFICANT CHANGE UP (ref 0.2–1.2)
BILIRUB UR-MCNC: NEGATIVE — SIGNIFICANT CHANGE UP
BUN SERPL-MCNC: 16 MG/DL — SIGNIFICANT CHANGE UP (ref 10–20)
CALCIUM SERPL-MCNC: 9.8 MG/DL — SIGNIFICANT CHANGE UP (ref 8.5–10.1)
CHLORIDE SERPL-SCNC: 103 MMOL/L — SIGNIFICANT CHANGE UP (ref 98–110)
CO2 SERPL-SCNC: 27 MMOL/L — SIGNIFICANT CHANGE UP (ref 17–32)
COLOR SPEC: COLORLESS — SIGNIFICANT CHANGE UP
CREAT SERPL-MCNC: 0.7 MG/DL — SIGNIFICANT CHANGE UP (ref 0.7–1.5)
DIFF PNL FLD: NEGATIVE — SIGNIFICANT CHANGE UP
EOSINOPHIL # BLD AUTO: 0.1 K/UL — SIGNIFICANT CHANGE UP (ref 0–0.7)
EOSINOPHIL NFR BLD AUTO: 1.6 % — SIGNIFICANT CHANGE UP (ref 0–8)
GLUCOSE SERPL-MCNC: 100 MG/DL — HIGH (ref 70–99)
GLUCOSE UR QL: NEGATIVE — SIGNIFICANT CHANGE UP
HCT VFR BLD CALC: 43.5 % — SIGNIFICANT CHANGE UP (ref 37–47)
HGB BLD-MCNC: 14.3 G/DL — SIGNIFICANT CHANGE UP (ref 12–16)
IMM GRANULOCYTES NFR BLD AUTO: 0.3 % — SIGNIFICANT CHANGE UP (ref 0.1–0.3)
INR BLD: 0.99 RATIO — SIGNIFICANT CHANGE UP (ref 0.65–1.3)
KETONES UR-MCNC: NEGATIVE — SIGNIFICANT CHANGE UP
LEUKOCYTE ESTERASE UR-ACNC: NEGATIVE — SIGNIFICANT CHANGE UP
LYMPHOCYTES # BLD AUTO: 1.74 K/UL — SIGNIFICANT CHANGE UP (ref 1.2–3.4)
LYMPHOCYTES # BLD AUTO: 28.3 % — SIGNIFICANT CHANGE UP (ref 20.5–51.1)
MCHC RBC-ENTMCNC: 28.9 PG — SIGNIFICANT CHANGE UP (ref 27–31)
MCHC RBC-ENTMCNC: 32.9 G/DL — SIGNIFICANT CHANGE UP (ref 32–37)
MCV RBC AUTO: 88.1 FL — SIGNIFICANT CHANGE UP (ref 81–99)
MONOCYTES # BLD AUTO: 0.57 K/UL — SIGNIFICANT CHANGE UP (ref 0.1–0.6)
MONOCYTES NFR BLD AUTO: 9.3 % — SIGNIFICANT CHANGE UP (ref 1.7–9.3)
NEUTROPHILS # BLD AUTO: 3.69 K/UL — SIGNIFICANT CHANGE UP (ref 1.4–6.5)
NEUTROPHILS NFR BLD AUTO: 60 % — SIGNIFICANT CHANGE UP (ref 42.2–75.2)
NITRITE UR-MCNC: NEGATIVE — SIGNIFICANT CHANGE UP
NRBC # BLD: 0 /100 WBCS — SIGNIFICANT CHANGE UP (ref 0–0)
PH UR: 6 — SIGNIFICANT CHANGE UP (ref 5–8)
PLATELET # BLD AUTO: 246 K/UL — SIGNIFICANT CHANGE UP (ref 130–400)
POTASSIUM SERPL-MCNC: 4.2 MMOL/L — SIGNIFICANT CHANGE UP (ref 3.5–5)
POTASSIUM SERPL-SCNC: 4.2 MMOL/L — SIGNIFICANT CHANGE UP (ref 3.5–5)
PROT SERPL-MCNC: 7.4 G/DL — SIGNIFICANT CHANGE UP (ref 6–8)
PROT UR-MCNC: NEGATIVE — SIGNIFICANT CHANGE UP
PROTHROM AB SERPL-ACNC: 11.4 SEC — SIGNIFICANT CHANGE UP (ref 9.95–12.87)
RBC # BLD: 4.94 M/UL — SIGNIFICANT CHANGE UP (ref 4.2–5.4)
RBC # FLD: 12.7 % — SIGNIFICANT CHANGE UP (ref 11.5–14.5)
SODIUM SERPL-SCNC: 139 MMOL/L — SIGNIFICANT CHANGE UP (ref 135–146)
SP GR SPEC: 1.01 — LOW (ref 1.01–1.03)
UROBILINOGEN FLD QL: SIGNIFICANT CHANGE UP
WBC # BLD: 6.15 K/UL — SIGNIFICANT CHANGE UP (ref 4.8–10.8)
WBC # FLD AUTO: 6.15 K/UL — SIGNIFICANT CHANGE UP (ref 4.8–10.8)

## 2021-09-15 PROCEDURE — 93010 ELECTROCARDIOGRAM REPORT: CPT

## 2021-09-15 PROCEDURE — 71046 X-RAY EXAM CHEST 2 VIEWS: CPT | Mod: 26

## 2021-09-15 NOTE — H&P PST ADULT - REASON FOR ADMISSION
72 Y/O FEMALE HERE FOR PRE-ADMISSION SURGICAL TESTING. PATIENT REPORTS SHE PALPATED A "LUMP" TO HER RIGHT BREAST IN APRIL. YARI, MRI AND BIOPSY REVEALED STAGE 2b BREAST CANCER. THEY ALSO FOUND "LUMPS" TO HER LEFT BREAST.  NOW FOR SCHEDULED PROCEDURE.

## 2021-09-15 NOTE — H&P PST ADULT - HISTORY OF PRESENT ILLNESS
PATIENT DENIES CHEST PAIN, SHORTNESS OF BREATH, COUGHING, FEVER, DYSURIA.  REPORTS OCCASIONAL  PALPITATIONS,  CAN WALK UP 2-3 FLIGHTS OF STEPS WITHOUT SOB.    NO COUGH, FEVER, SORE THROAT, HEADACHE, LOSS OF TASTE OR SMELL. NO KNOWN EXPOSURE TO ANYONE WITH COVID. PATIENT WAS INSTRUCTED TO ISOLATE FROM NOW UNTIL THE SURGERY.  FULLY VACCINATED.    Anesthesia Alert  + Difficult Airway (CLASS IV)  NO--History of neck surgery or radiation  NO--Limited ROM of neck  NO--History of Malignant hyperthermia  NO--Personal or family history of Pseudocholinesterase deficiency  NO--Prior Anesthesia Complication  NO--Latex Allergy  NO--Loose teeth  NO--History of Rheumatoid Arthritis  NO--YUNIEL (REPORTS + SNORRING)  NO-bleeding risk

## 2021-09-15 NOTE — H&P PST ADULT - NSICDXFAMILYHX_GEN_ALL_CORE_FT
FAMILY HISTORY:  Father  Still living? Yes, Estimated age: Age Unknown  FH: HTN (hypertension), Age at diagnosis: Age Unknown    Mother  Still living? No  Family history of cervical cancer, Age at diagnosis: Age Unknown  Family history of diabetes mellitus (DM), Age at diagnosis: Age Unknown  FH: Alzheimers disease, Age at diagnosis: Age Unknown

## 2021-09-15 NOTE — H&P PST ADULT - NSICDXPASTMEDICALHX_GEN_ALL_CORE_FT
PAST MEDICAL HISTORY:  Breast cancer RIGHT BREAST CA- NEW DX    Cervical cancer 1988. S/P RADICAL HYSTERECTOMY    HTN (hypertension)

## 2021-09-17 PROBLEM — I10 ESSENTIAL (PRIMARY) HYPERTENSION: Chronic | Status: ACTIVE | Noted: 2021-09-15

## 2021-09-17 PROBLEM — C53.9 MALIGNANT NEOPLASM OF CERVIX UTERI, UNSPECIFIED: Chronic | Status: ACTIVE | Noted: 2021-09-15

## 2021-09-20 ENCOUNTER — NON-APPOINTMENT (OUTPATIENT)
Age: 71
End: 2021-09-20

## 2021-09-21 DIAGNOSIS — M79.2 NEURALGIA AND NEURITIS, UNSPECIFIED: ICD-10-CM

## 2021-09-21 DIAGNOSIS — G89.18 OTHER ACUTE POSTPROCEDURAL PAIN: ICD-10-CM

## 2021-09-23 ENCOUNTER — RESULT REVIEW (OUTPATIENT)
Age: 71
End: 2021-09-23

## 2021-09-23 ENCOUNTER — OUTPATIENT (OUTPATIENT)
Dept: OUTPATIENT SERVICES | Facility: HOSPITAL | Age: 71
LOS: 1 days | Discharge: HOME | End: 2021-09-23
Payer: COMMERCIAL

## 2021-09-23 DIAGNOSIS — Z90.710 ACQUIRED ABSENCE OF BOTH CERVIX AND UTERUS: Chronic | ICD-10-CM

## 2021-09-23 DIAGNOSIS — Z90.49 ACQUIRED ABSENCE OF OTHER SPECIFIED PARTS OF DIGESTIVE TRACT: Chronic | ICD-10-CM

## 2021-09-23 PROCEDURE — 77065 DX MAMMO INCL CAD UNI: CPT | Mod: 26,RT

## 2021-09-23 PROCEDURE — 19285 PERQ DEV BREAST 1ST US IMAG: CPT | Mod: RT

## 2021-09-24 ENCOUNTER — NON-APPOINTMENT (OUTPATIENT)
Age: 71
End: 2021-09-24

## 2021-09-26 ENCOUNTER — LABORATORY RESULT (OUTPATIENT)
Age: 71
End: 2021-09-26

## 2021-09-26 ENCOUNTER — OUTPATIENT (OUTPATIENT)
Dept: OUTPATIENT SERVICES | Facility: HOSPITAL | Age: 71
LOS: 1 days | Discharge: HOME | End: 2021-09-26

## 2021-09-26 DIAGNOSIS — Z90.710 ACQUIRED ABSENCE OF BOTH CERVIX AND UTERUS: Chronic | ICD-10-CM

## 2021-09-26 DIAGNOSIS — Z90.49 ACQUIRED ABSENCE OF OTHER SPECIFIED PARTS OF DIGESTIVE TRACT: Chronic | ICD-10-CM

## 2021-09-26 DIAGNOSIS — Z11.59 ENCOUNTER FOR SCREENING FOR OTHER VIRAL DISEASES: ICD-10-CM

## 2021-09-28 ENCOUNTER — RESULT REVIEW (OUTPATIENT)
Age: 71
End: 2021-09-28

## 2021-09-28 ENCOUNTER — OUTPATIENT (OUTPATIENT)
Dept: OUTPATIENT SERVICES | Facility: HOSPITAL | Age: 71
LOS: 1 days | Discharge: HOME | End: 2021-09-28
Payer: COMMERCIAL

## 2021-09-28 DIAGNOSIS — Z90.49 ACQUIRED ABSENCE OF OTHER SPECIFIED PARTS OF DIGESTIVE TRACT: Chronic | ICD-10-CM

## 2021-09-28 DIAGNOSIS — C50.919 MALIGNANT NEOPLASM OF UNSPECIFIED SITE OF UNSPECIFIED FEMALE BREAST: ICD-10-CM

## 2021-09-28 DIAGNOSIS — Z90.710 ACQUIRED ABSENCE OF BOTH CERVIX AND UTERUS: Chronic | ICD-10-CM

## 2021-09-28 PROCEDURE — 78195 LYMPH SYSTEM IMAGING: CPT | Mod: 26

## 2021-09-29 ENCOUNTER — INPATIENT (INPATIENT)
Facility: HOSPITAL | Age: 71
LOS: 2 days | Discharge: HOME HEALTH | End: 2021-10-02
Attending: SURGERY | Admitting: SURGERY
Payer: MEDICARE

## 2021-09-29 ENCOUNTER — RESULT REVIEW (OUTPATIENT)
Age: 71
End: 2021-09-29

## 2021-09-29 ENCOUNTER — APPOINTMENT (OUTPATIENT)
Dept: PLASTIC SURGERY | Facility: HOSPITAL | Age: 71
End: 2021-09-29
Payer: COMMERCIAL

## 2021-09-29 ENCOUNTER — APPOINTMENT (OUTPATIENT)
Dept: BREAST CENTER | Facility: HOSPITAL | Age: 71
End: 2021-09-29
Payer: MEDICARE

## 2021-09-29 VITALS
DIASTOLIC BLOOD PRESSURE: 67 MMHG | RESPIRATION RATE: 18 BRPM | OXYGEN SATURATION: 100 % | TEMPERATURE: 98 F | SYSTOLIC BLOOD PRESSURE: 147 MMHG | HEART RATE: 56 BPM | WEIGHT: 128.09 LBS | HEIGHT: 60 IN

## 2021-09-29 DIAGNOSIS — Z90.710 ACQUIRED ABSENCE OF BOTH CERVIX AND UTERUS: Chronic | ICD-10-CM

## 2021-09-29 DIAGNOSIS — Z90.49 ACQUIRED ABSENCE OF OTHER SPECIFIED PARTS OF DIGESTIVE TRACT: Chronic | ICD-10-CM

## 2021-09-29 LAB
ABO RH CONFIRMATION: SIGNIFICANT CHANGE UP
BLD GP AB SCN SERPL QL: SIGNIFICANT CHANGE UP

## 2021-09-29 PROCEDURE — 88342 IMHCHEM/IMCYTCHM 1ST ANTB: CPT | Mod: 26,59

## 2021-09-29 PROCEDURE — 38525 BIOPSY/REMOVAL LYMPH NODES: CPT | Mod: 59,RT

## 2021-09-29 PROCEDURE — 19340 INSJ BREAST IMPLT SM D MAST: CPT | Mod: 50

## 2021-09-29 PROCEDURE — 88360 TUMOR IMMUNOHISTOCHEM/MANUAL: CPT | Mod: 26

## 2021-09-29 PROCEDURE — 38900 IO MAP OF SENT LYMPH NODE: CPT | Mod: RT

## 2021-09-29 PROCEDURE — 15777 ACELLULAR DERM MATRIX IMPLT: CPT | Mod: RT

## 2021-09-29 PROCEDURE — 88341 IMHCHEM/IMCYTCHM EA ADD ANTB: CPT | Mod: 26,59

## 2021-09-29 PROCEDURE — 19303 MAST SIMPLE COMPLETE: CPT | Mod: LT

## 2021-09-29 PROCEDURE — 19307 MAST MOD RAD: CPT | Mod: RT

## 2021-09-29 PROCEDURE — 38745 REMOVE ARMPIT LYMPH NODES: CPT | Mod: 59,RT

## 2021-09-29 PROCEDURE — 88307 TISSUE EXAM BY PATHOLOGIST: CPT | Mod: 26

## 2021-09-29 RX ORDER — ACETAMINOPHEN 500 MG
1000 TABLET ORAL ONCE
Refills: 0 | Status: DISCONTINUED | OUTPATIENT
Start: 2021-09-29 | End: 2021-09-29

## 2021-09-29 RX ORDER — CHLORHEXIDINE GLUCONATE 213 G/1000ML
1 SOLUTION TOPICAL
Refills: 0 | Status: DISCONTINUED | OUTPATIENT
Start: 2021-09-29 | End: 2021-10-02

## 2021-09-29 RX ORDER — ACETAMINOPHEN 500 MG
1000 TABLET ORAL ONCE
Refills: 0 | Status: COMPLETED | OUTPATIENT
Start: 2021-09-29 | End: 2021-09-29

## 2021-09-29 RX ORDER — CEFAZOLIN SODIUM 1 G
2000 VIAL (EA) INJECTION EVERY 8 HOURS
Refills: 0 | Status: DISCONTINUED | OUTPATIENT
Start: 2021-09-29 | End: 2021-10-02

## 2021-09-29 RX ORDER — PANTOPRAZOLE SODIUM 20 MG/1
40 TABLET, DELAYED RELEASE ORAL
Refills: 0 | Status: DISCONTINUED | OUTPATIENT
Start: 2021-09-29 | End: 2021-10-02

## 2021-09-29 RX ORDER — METOPROLOL TARTRATE 50 MG
25 TABLET ORAL DAILY
Refills: 0 | Status: DISCONTINUED | OUTPATIENT
Start: 2021-09-29 | End: 2021-09-30

## 2021-09-29 RX ORDER — ACETAMINOPHEN 500 MG
650 TABLET ORAL EVERY 6 HOURS
Refills: 0 | Status: DISCONTINUED | OUTPATIENT
Start: 2021-09-29 | End: 2021-09-30

## 2021-09-29 RX ORDER — HYDROMORPHONE HYDROCHLORIDE 2 MG/ML
0.5 INJECTION INTRAMUSCULAR; INTRAVENOUS; SUBCUTANEOUS ONCE
Refills: 0 | Status: DISCONTINUED | OUTPATIENT
Start: 2021-09-29 | End: 2021-09-29

## 2021-09-29 RX ORDER — CANDESARTAN CILEXETIL 8 MG/1
1 TABLET ORAL
Qty: 0 | Refills: 0 | DISCHARGE

## 2021-09-29 RX ORDER — TRAMADOL HYDROCHLORIDE 50 MG/1
50 TABLET ORAL EVERY 8 HOURS
Refills: 0 | Status: DISCONTINUED | OUTPATIENT
Start: 2021-09-29 | End: 2021-10-02

## 2021-09-29 RX ORDER — GABAPENTIN 400 MG/1
300 CAPSULE ORAL THREE TIMES A DAY
Refills: 0 | Status: DISCONTINUED | OUTPATIENT
Start: 2021-09-29 | End: 2021-09-30

## 2021-09-29 RX ORDER — SODIUM CHLORIDE 9 MG/ML
500 INJECTION, SOLUTION INTRAVENOUS ONCE
Refills: 0 | Status: COMPLETED | OUTPATIENT
Start: 2021-09-29 | End: 2021-09-29

## 2021-09-29 RX ORDER — SODIUM CHLORIDE 9 MG/ML
1000 INJECTION, SOLUTION INTRAVENOUS
Refills: 0 | Status: DISCONTINUED | OUTPATIENT
Start: 2021-09-29 | End: 2021-09-29

## 2021-09-29 RX ORDER — HEPARIN SODIUM 5000 [USP'U]/ML
5000 INJECTION INTRAVENOUS; SUBCUTANEOUS ONCE
Refills: 0 | Status: COMPLETED | OUTPATIENT
Start: 2021-09-29 | End: 2021-09-29

## 2021-09-29 RX ORDER — DIAZEPAM 5 MG
0.5 TABLET ORAL EVERY 8 HOURS
Refills: 0 | Status: DISCONTINUED | OUTPATIENT
Start: 2021-09-29 | End: 2021-09-30

## 2021-09-29 RX ORDER — HEPARIN SODIUM 5000 [USP'U]/ML
5000 INJECTION INTRAVENOUS; SUBCUTANEOUS ONCE
Refills: 0 | Status: DISCONTINUED | OUTPATIENT
Start: 2021-09-29 | End: 2021-09-29

## 2021-09-29 RX ORDER — HYDROMORPHONE HYDROCHLORIDE 2 MG/ML
0.5 INJECTION INTRAMUSCULAR; INTRAVENOUS; SUBCUTANEOUS
Refills: 0 | Status: DISCONTINUED | OUTPATIENT
Start: 2021-09-29 | End: 2021-09-29

## 2021-09-29 RX ORDER — NITROGLYCERIN 6.5 MG
1 CAPSULE, EXTENDED RELEASE ORAL ONCE
Refills: 0 | Status: COMPLETED | OUTPATIENT
Start: 2021-09-29 | End: 2021-09-29

## 2021-09-29 RX ORDER — HEPARIN SODIUM 5000 [USP'U]/ML
5000 INJECTION INTRAVENOUS; SUBCUTANEOUS EVERY 8 HOURS
Refills: 0 | Status: DISCONTINUED | OUTPATIENT
Start: 2021-09-29 | End: 2021-10-02

## 2021-09-29 RX ORDER — SODIUM CHLORIDE 9 MG/ML
1000 INJECTION, SOLUTION INTRAVENOUS
Refills: 0 | Status: DISCONTINUED | OUTPATIENT
Start: 2021-09-29 | End: 2021-10-01

## 2021-09-29 RX ORDER — LOSARTAN POTASSIUM 100 MG/1
25 TABLET, FILM COATED ORAL DAILY
Refills: 0 | Status: DISCONTINUED | OUTPATIENT
Start: 2021-09-29 | End: 2021-09-30

## 2021-09-29 RX ADMIN — SODIUM CHLORIDE 1000 MILLILITER(S): 9 INJECTION, SOLUTION INTRAVENOUS at 23:46

## 2021-09-29 RX ADMIN — Medication 400 MILLIGRAM(S): at 14:52

## 2021-09-29 RX ADMIN — SODIUM CHLORIDE 125 MILLILITER(S): 9 INJECTION, SOLUTION INTRAVENOUS at 15:02

## 2021-09-29 RX ADMIN — Medication 1 INCH(S): at 22:16

## 2021-09-29 RX ADMIN — HYDROMORPHONE HYDROCHLORIDE 0.5 MILLIGRAM(S): 2 INJECTION INTRAMUSCULAR; INTRAVENOUS; SUBCUTANEOUS at 14:40

## 2021-09-29 RX ADMIN — HEPARIN SODIUM 5000 UNIT(S): 5000 INJECTION INTRAVENOUS; SUBCUTANEOUS at 06:55

## 2021-09-29 RX ADMIN — HYDROMORPHONE HYDROCHLORIDE 0.5 MILLIGRAM(S): 2 INJECTION INTRAMUSCULAR; INTRAVENOUS; SUBCUTANEOUS at 16:08

## 2021-09-29 RX ADMIN — Medication 100 MILLIGRAM(S): at 22:16

## 2021-09-29 RX ADMIN — HEPARIN SODIUM 5000 UNIT(S): 5000 INJECTION INTRAVENOUS; SUBCUTANEOUS at 22:16

## 2021-09-29 RX ADMIN — HYDROMORPHONE HYDROCHLORIDE 0.5 MILLIGRAM(S): 2 INJECTION INTRAMUSCULAR; INTRAVENOUS; SUBCUTANEOUS at 15:19

## 2021-09-29 RX ADMIN — Medication 1000 MILLIGRAM(S): at 15:19

## 2021-09-29 RX ADMIN — TRAMADOL HYDROCHLORIDE 50 MILLIGRAM(S): 50 TABLET ORAL at 19:55

## 2021-09-29 RX ADMIN — GABAPENTIN 300 MILLIGRAM(S): 400 CAPSULE ORAL at 22:16

## 2021-09-29 RX ADMIN — HYDROMORPHONE HYDROCHLORIDE 0.5 MILLIGRAM(S): 2 INJECTION INTRAMUSCULAR; INTRAVENOUS; SUBCUTANEOUS at 16:23

## 2021-09-29 NOTE — BRIEF OPERATIVE NOTE - OPERATION/FINDINGS
right breast, right axillary contents; left breast, left sentinel node #1: 107 (hot and blue); left sentinel node #2: 324 (hot and blue)
Right: Total submuscular placement of silicone implant with alloderm matrix   Left: Prepectoral placement of silicone implant

## 2021-09-29 NOTE — BRIEF OPERATIVE NOTE - NSICDXBRIEFPROCEDURE_GEN_ALL_CORE_FT
PROCEDURES:  Reconstruction of both breasts with insertion of silicone implants 29-Sep-2021 14:11:03  Laura Teixeira  Implantation of acellular dermal matrix for breast reconstruction 29-Sep-2021 14:11:58 Right breast Laura Teixeira  
PROCEDURES:  Right mastectomy with lymph node dissection 29-Sep-2021 11:47:58  Maya Ferguson  Left mastectomy with sentinel lymph node biopsy 29-Sep-2021 11:48:21  Maya Ferguson

## 2021-09-29 NOTE — BRIEF OPERATIVE NOTE - SPECIMENS
None
right breast, right axillary contents; left breast, left sentinel node #1: 107 (hot and blue); left sentinel node #2: 324 (hot and blue)

## 2021-09-29 NOTE — CHART NOTE - NSCHARTNOTEFT_GEN_A_CORE
PACU ANESTHESIA ADMISSION NOTE      Procedure: Right mastectomy with lymph node dissection    Left mastectomy with sentinel lymph node biopsy    Reconstruction of both breasts with insertion of silicone implants    Implantation of acellular dermal matrix for breast reconstruction  Right breast      Post op diagnosis:  Breast cancer, right    Absence of breast, bilateral        ____  Intubated  TV:______       Rate: ______      FiO2: ______    __x__  Patent Airway    ____  Full return of protective reflexes    ____  Full recovery from anesthesia / back to baseline     Vitals:   T:      98     R:  12                BP:  93/57                Sat:      100%             P:  57      Mental Status:  __x__ Awake   _____ Alert   _____ Drowsy   _____ Sedated    Nausea/Vomiting:  __x__ NO  ______Yes,   See Post - Op Orders          Pain Scale (0-10):  _____    Treatment: ____ None    ___x_ See Post - Op/PCA Orders    Post - Operative Fluids:   ____ Oral   ___x_ See Post - Op Orders    Plan: Discharge:   ____Home       ___x__Floor     _____Critical Care    _____  Other:_________________    Comments: Uneventful intraoperative course. No anesthesia issues or complications noted.  Patient stable upon arrival to PACU. Report given to RN. Discharge when criteria met.

## 2021-09-29 NOTE — BRIEF OPERATIVE NOTE - NSICDXBRIEFPOSTOP_GEN_ALL_CORE_FT
POST-OP DIAGNOSIS:  Breast cancer, right 29-Sep-2021 11:48:39  Maya Ferguson  
POST-OP DIAGNOSIS:  Absence of breast, bilateral 29-Sep-2021 14:12:22  Laura Teixeira

## 2021-09-29 NOTE — BRIEF OPERATIVE NOTE - NSICDXBRIEFPREOP_GEN_ALL_CORE_FT
PRE-OP DIAGNOSIS:  Absence of breast, bilateral 29-Sep-2021 14:12:18  Laura Teixeira  
PRE-OP DIAGNOSIS:  Breast cancer, right 29-Sep-2021 11:48:33  Maya Ferguson

## 2021-09-29 NOTE — REVIEW OF SYSTEMS
[As Noted in HPI] : as noted in HPI [Breast Pain] : breast pain [Breast Lump] : breast lump [Negative] : Heme/Lymph [Fever] : no fever [Chills] : no chills [Abn Vaginal Bleeding] : no unexplained vaginal bleeding [Skin Lesions] : no skin lesions [Skin Wound] : no skin wound [Hot Flashes] : no hot flashes Cheiloplasty (Less Than 50%) Text: A decision was made to reconstruct the defect with a  cheiloplasty.  The defect was undermined extensively.  Additional orbicularis oris muscle was excised with a 15 blade scalpel.  The defect was converted into a full thickness wedge, of less than 50% of the vertical height of the lip, to facilite a better cosmetic result.  Small vessels were then tied off with 5-0 monocyrl. The orbicularis oris, superficial fascia, adipose and dermis were then reapproximated.  After the deeper layers were approximated the epidermis was reapproximated with particular care given to realign the vermilion border.

## 2021-09-30 LAB
ANION GAP SERPL CALC-SCNC: 10 MMOL/L — SIGNIFICANT CHANGE UP (ref 7–14)
ANION GAP SERPL CALC-SCNC: 12 MMOL/L — SIGNIFICANT CHANGE UP (ref 7–14)
APTT BLD: 28.4 SEC — SIGNIFICANT CHANGE UP (ref 27–39.2)
BUN SERPL-MCNC: 12 MG/DL — SIGNIFICANT CHANGE UP (ref 10–20)
BUN SERPL-MCNC: 14 MG/DL — SIGNIFICANT CHANGE UP (ref 10–20)
CALCIUM SERPL-MCNC: 8.1 MG/DL — LOW (ref 8.5–10.1)
CALCIUM SERPL-MCNC: 8.4 MG/DL — LOW (ref 8.5–10.1)
CHLORIDE SERPL-SCNC: 101 MMOL/L — SIGNIFICANT CHANGE UP (ref 98–110)
CHLORIDE SERPL-SCNC: 103 MMOL/L — SIGNIFICANT CHANGE UP (ref 98–110)
CK MB CFR SERPL CALC: 3.1 NG/ML — SIGNIFICANT CHANGE UP (ref 0.6–6.3)
CK MB CFR SERPL CALC: 5.4 NG/ML — SIGNIFICANT CHANGE UP (ref 0.6–6.3)
CK MB CFR SERPL CALC: 6.3 NG/ML — SIGNIFICANT CHANGE UP (ref 0.6–6.3)
CK SERPL-CCNC: 368 U/L — HIGH (ref 0–225)
CK SERPL-CCNC: 409 U/L — HIGH (ref 0–225)
CK SERPL-CCNC: 426 U/L — HIGH (ref 0–225)
CO2 SERPL-SCNC: 21 MMOL/L — SIGNIFICANT CHANGE UP (ref 17–32)
CO2 SERPL-SCNC: 22 MMOL/L — SIGNIFICANT CHANGE UP (ref 17–32)
COVID-19 SPIKE DOMAIN AB INTERP: POSITIVE
COVID-19 SPIKE DOMAIN ANTIBODY RESULT: >250 U/ML — HIGH
CREAT SERPL-MCNC: 0.7 MG/DL — SIGNIFICANT CHANGE UP (ref 0.7–1.5)
CREAT SERPL-MCNC: 0.7 MG/DL — SIGNIFICANT CHANGE UP (ref 0.7–1.5)
GAS PNL BLDA: SIGNIFICANT CHANGE UP
GLUCOSE SERPL-MCNC: 112 MG/DL — HIGH (ref 70–99)
GLUCOSE SERPL-MCNC: 122 MG/DL — HIGH (ref 70–99)
HCT VFR BLD CALC: 30.9 % — LOW (ref 37–47)
HCT VFR BLD CALC: 33.7 % — LOW (ref 37–47)
HCT VFR BLD CALC: 33.9 % — LOW (ref 37–47)
HGB BLD-MCNC: 10.3 G/DL — LOW (ref 12–16)
HGB BLD-MCNC: 10.9 G/DL — LOW (ref 12–16)
HGB BLD-MCNC: 11.4 G/DL — LOW (ref 12–16)
INR BLD: 1.17 RATIO — SIGNIFICANT CHANGE UP (ref 0.65–1.3)
LACTATE SERPL-SCNC: 1.9 MMOL/L — SIGNIFICANT CHANGE UP (ref 0.7–2)
LACTATE SERPL-SCNC: 2.2 MMOL/L — HIGH (ref 0.7–2)
MAGNESIUM SERPL-MCNC: 1.5 MG/DL — LOW (ref 1.8–2.4)
MAGNESIUM SERPL-MCNC: 1.7 MG/DL — LOW (ref 1.8–2.4)
MCHC RBC-ENTMCNC: 29 PG — SIGNIFICANT CHANGE UP (ref 27–31)
MCHC RBC-ENTMCNC: 29.2 PG — SIGNIFICANT CHANGE UP (ref 27–31)
MCHC RBC-ENTMCNC: 29.3 PG — SIGNIFICANT CHANGE UP (ref 27–31)
MCHC RBC-ENTMCNC: 32.3 G/DL — SIGNIFICANT CHANGE UP (ref 32–37)
MCHC RBC-ENTMCNC: 33.3 G/DL — SIGNIFICANT CHANGE UP (ref 32–37)
MCHC RBC-ENTMCNC: 33.6 G/DL — SIGNIFICANT CHANGE UP (ref 32–37)
MCV RBC AUTO: 86.9 FL — SIGNIFICANT CHANGE UP (ref 81–99)
MCV RBC AUTO: 88 FL — SIGNIFICANT CHANGE UP (ref 81–99)
MCV RBC AUTO: 89.6 FL — SIGNIFICANT CHANGE UP (ref 81–99)
NRBC # BLD: 0 /100 WBCS — SIGNIFICANT CHANGE UP (ref 0–0)
PHOSPHATE SERPL-MCNC: 3.7 MG/DL — SIGNIFICANT CHANGE UP (ref 2.1–4.9)
PHOSPHATE SERPL-MCNC: 4.2 MG/DL — SIGNIFICANT CHANGE UP (ref 2.1–4.9)
PLATELET # BLD AUTO: 177 K/UL — SIGNIFICANT CHANGE UP (ref 130–400)
PLATELET # BLD AUTO: 202 K/UL — SIGNIFICANT CHANGE UP (ref 130–400)
PLATELET # BLD AUTO: 218 K/UL — SIGNIFICANT CHANGE UP (ref 130–400)
POTASSIUM SERPL-MCNC: 4.4 MMOL/L — SIGNIFICANT CHANGE UP (ref 3.5–5)
POTASSIUM SERPL-MCNC: 4.4 MMOL/L — SIGNIFICANT CHANGE UP (ref 3.5–5)
POTASSIUM SERPL-SCNC: 4.4 MMOL/L — SIGNIFICANT CHANGE UP (ref 3.5–5)
POTASSIUM SERPL-SCNC: 4.4 MMOL/L — SIGNIFICANT CHANGE UP (ref 3.5–5)
PROTHROM AB SERPL-ACNC: 13.4 SEC — HIGH (ref 9.95–12.87)
RBC # BLD: 3.51 M/UL — LOW (ref 4.2–5.4)
RBC # BLD: 3.76 M/UL — LOW (ref 4.2–5.4)
RBC # BLD: 3.9 M/UL — LOW (ref 4.2–5.4)
RBC # FLD: 12.7 % — SIGNIFICANT CHANGE UP (ref 11.5–14.5)
RBC # FLD: 12.8 % — SIGNIFICANT CHANGE UP (ref 11.5–14.5)
RBC # FLD: 12.9 % — SIGNIFICANT CHANGE UP (ref 11.5–14.5)
SARS-COV-2 IGG+IGM SERPL QL IA: >250 U/ML — HIGH
SARS-COV-2 IGG+IGM SERPL QL IA: POSITIVE
SODIUM SERPL-SCNC: 133 MMOL/L — LOW (ref 135–146)
SODIUM SERPL-SCNC: 136 MMOL/L — SIGNIFICANT CHANGE UP (ref 135–146)
TROPONIN T SERPL-MCNC: <0.01 NG/ML — SIGNIFICANT CHANGE UP
WBC # BLD: 12 K/UL — HIGH (ref 4.8–10.8)
WBC # BLD: 8.12 K/UL — SIGNIFICANT CHANGE UP (ref 4.8–10.8)
WBC # BLD: 9.13 K/UL — SIGNIFICANT CHANGE UP (ref 4.8–10.8)
WBC # FLD AUTO: 12 K/UL — HIGH (ref 4.8–10.8)
WBC # FLD AUTO: 8.12 K/UL — SIGNIFICANT CHANGE UP (ref 4.8–10.8)
WBC # FLD AUTO: 9.13 K/UL — SIGNIFICANT CHANGE UP (ref 4.8–10.8)

## 2021-09-30 PROCEDURE — 71045 X-RAY EXAM CHEST 1 VIEW: CPT | Mod: 26

## 2021-09-30 PROCEDURE — 93010 ELECTROCARDIOGRAM REPORT: CPT

## 2021-09-30 PROCEDURE — 93306 TTE W/DOPPLER COMPLETE: CPT | Mod: 26

## 2021-09-30 PROCEDURE — 99291 CRITICAL CARE FIRST HOUR: CPT

## 2021-09-30 RX ORDER — MAGNESIUM SULFATE 500 MG/ML
2 VIAL (ML) INJECTION ONCE
Refills: 0 | Status: COMPLETED | OUTPATIENT
Start: 2021-09-30 | End: 2021-09-30

## 2021-09-30 RX ORDER — DIAZEPAM 5 MG
1 TABLET ORAL EVERY 8 HOURS
Refills: 0 | Status: DISCONTINUED | OUTPATIENT
Start: 2021-09-30 | End: 2021-09-30

## 2021-09-30 RX ORDER — ACETAMINOPHEN 500 MG
1000 TABLET ORAL ONCE
Refills: 0 | Status: COMPLETED | OUTPATIENT
Start: 2021-09-30 | End: 2021-09-30

## 2021-09-30 RX ORDER — SODIUM CHLORIDE 9 MG/ML
500 INJECTION, SOLUTION INTRAVENOUS ONCE
Refills: 0 | Status: COMPLETED | OUTPATIENT
Start: 2021-09-30 | End: 2021-09-30

## 2021-09-30 RX ORDER — SODIUM CHLORIDE 9 MG/ML
250 INJECTION, SOLUTION INTRAVENOUS ONCE
Refills: 0 | Status: COMPLETED | OUTPATIENT
Start: 2021-09-30 | End: 2021-09-30

## 2021-09-30 RX ORDER — OXYCODONE HYDROCHLORIDE 5 MG/1
5 TABLET ORAL ONCE
Refills: 0 | Status: DISCONTINUED | OUTPATIENT
Start: 2021-09-30 | End: 2021-09-30

## 2021-09-30 RX ORDER — SODIUM CHLORIDE 9 MG/ML
1000 INJECTION INTRAMUSCULAR; INTRAVENOUS; SUBCUTANEOUS ONCE
Refills: 0 | Status: COMPLETED | OUTPATIENT
Start: 2021-09-30 | End: 2021-09-30

## 2021-09-30 RX ORDER — SODIUM CHLORIDE 9 MG/ML
1000 INJECTION, SOLUTION INTRAVENOUS ONCE
Refills: 0 | Status: DISCONTINUED | OUTPATIENT
Start: 2021-09-30 | End: 2021-09-30

## 2021-09-30 RX ORDER — SENNA PLUS 8.6 MG/1
2 TABLET ORAL AT BEDTIME
Refills: 0 | Status: DISCONTINUED | OUTPATIENT
Start: 2021-09-30 | End: 2021-10-02

## 2021-09-30 RX ORDER — SODIUM CHLORIDE 9 MG/ML
500 INJECTION INTRAMUSCULAR; INTRAVENOUS; SUBCUTANEOUS ONCE
Refills: 0 | Status: COMPLETED | OUTPATIENT
Start: 2021-09-30 | End: 2021-09-30

## 2021-09-30 RX ORDER — ACETAMINOPHEN 500 MG
650 TABLET ORAL EVERY 6 HOURS
Refills: 0 | Status: DISCONTINUED | OUTPATIENT
Start: 2021-09-30 | End: 2021-10-02

## 2021-09-30 RX ADMIN — TRAMADOL HYDROCHLORIDE 50 MILLIGRAM(S): 50 TABLET ORAL at 18:34

## 2021-09-30 RX ADMIN — SODIUM CHLORIDE 1000 MILLILITER(S): 9 INJECTION, SOLUTION INTRAVENOUS at 22:25

## 2021-09-30 RX ADMIN — Medication 100 MILLIGRAM(S): at 21:04

## 2021-09-30 RX ADMIN — SODIUM CHLORIDE 500 MILLILITER(S): 9 INJECTION, SOLUTION INTRAVENOUS at 13:52

## 2021-09-30 RX ADMIN — TRAMADOL HYDROCHLORIDE 50 MILLIGRAM(S): 50 TABLET ORAL at 06:59

## 2021-09-30 RX ADMIN — SODIUM CHLORIDE 1000 MILLILITER(S): 9 INJECTION INTRAMUSCULAR; INTRAVENOUS; SUBCUTANEOUS at 01:33

## 2021-09-30 RX ADMIN — Medication 650 MILLIGRAM(S): at 11:13

## 2021-09-30 RX ADMIN — Medication 650 MILLIGRAM(S): at 12:43

## 2021-09-30 RX ADMIN — SENNA PLUS 2 TABLET(S): 8.6 TABLET ORAL at 21:07

## 2021-09-30 RX ADMIN — TRAMADOL HYDROCHLORIDE 50 MILLIGRAM(S): 50 TABLET ORAL at 17:32

## 2021-09-30 RX ADMIN — OXYCODONE HYDROCHLORIDE 5 MILLIGRAM(S): 5 TABLET ORAL at 21:06

## 2021-09-30 RX ADMIN — Medication 100 MILLIGRAM(S): at 05:44

## 2021-09-30 RX ADMIN — Medication 650 MILLIGRAM(S): at 05:44

## 2021-09-30 RX ADMIN — Medication 400 MILLIGRAM(S): at 02:40

## 2021-09-30 RX ADMIN — Medication 650 MILLIGRAM(S): at 17:28

## 2021-09-30 RX ADMIN — SODIUM CHLORIDE 1000 MILLILITER(S): 9 INJECTION, SOLUTION INTRAVENOUS at 05:01

## 2021-09-30 RX ADMIN — Medication 650 MILLIGRAM(S): at 00:13

## 2021-09-30 RX ADMIN — HEPARIN SODIUM 5000 UNIT(S): 5000 INJECTION INTRAVENOUS; SUBCUTANEOUS at 21:09

## 2021-09-30 RX ADMIN — GABAPENTIN 300 MILLIGRAM(S): 400 CAPSULE ORAL at 05:44

## 2021-09-30 RX ADMIN — Medication 50 GRAM(S): at 06:50

## 2021-09-30 RX ADMIN — Medication 650 MILLIGRAM(S): at 18:00

## 2021-09-30 RX ADMIN — SODIUM CHLORIDE 3000 MILLILITER(S): 9 INJECTION, SOLUTION INTRAVENOUS at 13:52

## 2021-09-30 RX ADMIN — Medication 100 MILLIGRAM(S): at 13:16

## 2021-09-30 RX ADMIN — Medication 50 GRAM(S): at 03:47

## 2021-09-30 RX ADMIN — SODIUM CHLORIDE 3000 MILLILITER(S): 9 INJECTION, SOLUTION INTRAVENOUS at 20:40

## 2021-09-30 RX ADMIN — HEPARIN SODIUM 5000 UNIT(S): 5000 INJECTION INTRAVENOUS; SUBCUTANEOUS at 13:16

## 2021-09-30 RX ADMIN — SODIUM CHLORIDE 1000 MILLILITER(S): 9 INJECTION, SOLUTION INTRAVENOUS at 21:09

## 2021-09-30 RX ADMIN — OXYCODONE HYDROCHLORIDE 5 MILLIGRAM(S): 5 TABLET ORAL at 22:00

## 2021-09-30 RX ADMIN — SODIUM CHLORIDE 125 MILLILITER(S): 9 INJECTION, SOLUTION INTRAVENOUS at 21:08

## 2021-09-30 RX ADMIN — HEPARIN SODIUM 5000 UNIT(S): 5000 INJECTION INTRAVENOUS; SUBCUTANEOUS at 05:44

## 2021-09-30 RX ADMIN — PANTOPRAZOLE SODIUM 40 MILLIGRAM(S): 20 TABLET, DELAYED RELEASE ORAL at 06:50

## 2021-09-30 RX ADMIN — SODIUM CHLORIDE 500 MILLILITER(S): 9 INJECTION, SOLUTION INTRAVENOUS at 10:48

## 2021-09-30 RX ADMIN — SODIUM CHLORIDE 1000 MILLILITER(S): 9 INJECTION INTRAMUSCULAR; INTRAVENOUS; SUBCUTANEOUS at 02:00

## 2021-09-30 RX ADMIN — Medication 1 MILLIGRAM(S): at 08:46

## 2021-09-30 RX ADMIN — Medication 50 GRAM(S): at 02:40

## 2021-09-30 RX ADMIN — SODIUM CHLORIDE 3000 MILLILITER(S): 9 INJECTION, SOLUTION INTRAVENOUS at 09:44

## 2021-09-30 NOTE — CONSULT NOTE ADULT - CONSULT REASON
s/p bilateral skin sparing mastectomy, left sln biopsy, right axillary dissection, bilateral insertion of implants -right alloderm , POD#1 hypotension

## 2021-09-30 NOTE — PROGRESS NOTE ADULT - SUBJECTIVE AND OBJECTIVE BOX
JOSE FRANCISCO THOMAS  71y Female   [id number]    Hospital Day: 2  Post Operative Day:1  Procedure:s/p bilateral skin sparing mastectomy , left sln biopsy , right axillary dissection , bilateral insertion of implants -right alloderm     · Operative Findings	right breast, right axillary contents; left breast, left sentinel node #1: 107 (hot and blue); left sentinel node #2: 324 (hot and blue)    · Operative Findings	Right: Total submuscular placement of silicone implant with alloderm matrix   Left: Prepectoral placement of silicone implant    Patient is a 71y old  Female who presents with a chief complaint of   PAST MEDICAL & SURGICAL HISTORY:  HTN (hypertension)    Breast cancer  RIGHT BREAST CA- NEW DX    Cervical cancer  1988. S/P RADICAL HYSTERECTOMY    History of appendectomy    H/O abdominal hysterectomy        Events of the Last 24h: patient hypotensive - 1L LR bolus given , stat labs drawn   Vital Signs Last 24 Hrs  T(C): 37.1 (30 Sep 2021 00:05), Max: 37.1 (30 Sep 2021 00:05)  T(F): 98.8 (30 Sep 2021 00:05), Max: 98.8 (30 Sep 2021 00:05)  HR: 67 (30 Sep 2021 00:05) (49 - 67)  BP: 85/48 (30 Sep 2021 00:05) (85/48 - 147/67)  BP(mean): 77 (29 Sep 2021 15:00) (65 - 77)  RR: 16 (30 Sep 2021 00:05) (11 - 19)  SpO2: 95% (30 Sep 2021 00:05) (95% - 100%)        Diet, Regular (09-29-21 @ 14:20)      I&O's Summary    29 Sep 2021 07:01  -  30 Sep 2021 00:54  --------------------------------------------------------  IN: 595 mL / OUT: 540 mL / NET: 55 mL     I&O's Detail    29 Sep 2021 07:01  -  30 Sep 2021 00:54  --------------------------------------------------------  IN:    IV PiggyBack: 100 mL    Lactated Ringers: 375 mL    Oral Fluid: 120 mL  Total IN: 595 mL    OUT:    Drain (mL): 35 mL    Drain (mL): 70 mL    Drain (mL): 50 mL    Drain (mL): 35 mL    Voided (mL): 350 mL  Total OUT: 540 mL    Total NET: 55 mL          MEDICATIONS  (STANDING):  acetaminophen   Tablet .. 650 milliGRAM(s) Oral every 6 hours  ceFAZolin   IVPB 2000 milliGRAM(s) IV Intermittent every 8 hours  chlorhexidine 4% Liquid 1 Application(s) Topical <User Schedule>  gabapentin 300 milliGRAM(s) Oral three times a day  heparin   Injectable 5000 Unit(s) SubCutaneous every 8 hours  lactated ringers. 1000 milliLiter(s) (125 mL/Hr) IV Continuous <Continuous>  losartan 25 milliGRAM(s) Oral daily  metoprolol succinate ER 25 milliGRAM(s) Oral daily  pantoprazole    Tablet 40 milliGRAM(s) Oral before breakfast    MEDICATIONS  (PRN):  diazepam  Injectable 0.5 milliGRAM(s) IV Push every 8 hours PRN Muscle spasms  traMADol 50 milliGRAM(s) Oral every 8 hours PRN Severe Pain (7 - 10)      PHYSICAL EXAM:    GENERAL: NAD    HEENT: NCAT    CHEST/LUNGS: CTAB    HEART: RRR,  No murmurs, rubs, or gallops    BREAST: Surgical bra in place. Incisions clean dry and intact. Capillary refill <3 secs bilateral breasts.No hematomas.   No bogginess noted. Breast are warm ,good color and good sensation noted.  Bilateral breast tenderness to palpation. 2-elan drains  in each breast with sero sanguionous outpt.    ABDOMEN: SNTND +BS    EXTREMITIES:  FROM, No clubbing, cyanosis, or edema, palpable pulse    NEURO: No focal neurological deficits    SKIN: No rashes or lesions    INCISION/WOUNDS:                          11.4   12.00 )-----------( 218      ( 30 Sep 2021 00:30 )             33.9        CBC Full  -  ( 30 Sep 2021 00:30 )  WBC Count : 12.00 K/uL  RBC Count : 3.90 M/uL  Hemoglobin : 11.4 g/dL  Hematocrit : 33.9 %  Platelet Count - Automated : 218 K/uL  Mean Cell Volume : 86.9 fL  Mean Cell Hemoglobin : 29.2 pg  Mean Cell Hemoglobin Concentration : 33.6 g/dL  Auto Neutrophil # : x  Auto Lymphocyte # : x  Auto Monocyte # : x  Auto Eosinophil # : x  Auto Basophil # : x  Auto Neutrophil % : x  Auto Lymphocyte % : x  Auto Monocyte % : x  Auto Eosinophil % : x  Auto Basophil % : x                        Assessment and Plan:   · Assessment	  -Keep surgical bra in place  - Capillary refill checks  -Monitor elan drain outpt   -Dispo planning, will need drain care  -Pain management  -Regular diet   -Follow up stat labs   -Monitor blood pressure

## 2021-09-30 NOTE — CONSULT NOTE ADULT - ASSESSMENT
Assessment & Plan  71y Female PMHx cervical CA in 1988, HTN, PSHx hysterectomy, appendectomy, found to have breast CA after palpating a lump in her R breast in april. MRI and biopsy 7/2021 revealed ductal carcinoma breast CA stage 2B with mets to R axillary LNs. Patient admitted on 9/29 and is now POD#1 s/p bilateral skin sparing mastectomy, left sln biopsy, right axillary dissection, bilateral insertion of implants (right alloderm). SICU consult for hypotension POD#1. Patient upgraded to SICU from .    NEURO:    Acute pain-controlled with Tylenol gabapentin 300 q8hr, prn valium, tramadol prn    RESP:     Oxygen insufficiency-wean off NC to RA as tolerate    Activity-ambulate as tolerated    [ ] FU STAT CXR     CARDS:   Acute post op hypotension  [ ] EKG, cardiac enzymes, echo pending  - s/p 2.5L of fluid, transiently responsive  Cardiologist: Dr Vallejo  - Home medications on hold: candesartan, metoprolol 25xl, ASA 81mg    GI/NUTR:     Diet, Regular, not taking PO at this time    continue with IVF LR @ 125cc/hr    GI Prophylaxis- PPI PO    Bowel regimen- senna, add miralax prn    /RENAL:   Monitor UO  placed pritchard for strict i&os, in setting of hypotension  [ ] trend lactate    Labs:          BUN/Cr- 14/0.7  -->          Electrolytes-Na 133 // K 4.4 // Mg 1.5 //  Phos 4.2 (09-30 @ 00:30)  Hypomagnesemia  - repleted 2g x 2    HEME/ONC:   DVT prophylaxis-heparin Injectable, SCDs    Labs: Hb/Hct:  11.4/33.9  -->                      Plts:  218  -->     [ ] trend CBC, monitor for bleeding    Breast CA, R breast with mets to R axillary LN (stage 2b)  - palpated breast lump in april, MRI and biopsy confirmed stage 2b breast CA  - now s/p bilateral mastectomy, with R axillary LN dissection, L sentinel node bx, bilateral breast implantation, MARIE drains x4  [ ] monitor MARIE drain output    - Dr Tejada is Oncologist plan to start chemotherapy and radiation post operatively    ID:  WBC- 12.00  --->> likely reactive post op  Monitor for signs of infection  Temp trend- 24hrs T(F): 98.8 (09-30 @ 00:05), Max: 98.8 (09-30 @ 00:05)  Antibiotics-ceFAZolin   IVPB 2000 every 8 hours    ENDO:   Glucose Glucose, Serum: 122 (09-30 @ 00:30)   HA1C pending    LINES/DRAINS:  PIV, [place]     DISPO:   SICU discussed with Dr Traina

## 2021-09-30 NOTE — PROGRESS NOTE ADULT - ASSESSMENT
-Keep surgical bra in place  - Capillary refill checks  -Monitor elan drain outpt   -Dispo planning, will need drain care  -Pain management  -Regular diet   -Follow up stat labs   -Monitor blood pressure

## 2021-09-30 NOTE — CONSULT NOTE ADULT - ATTENDING COMMENTS
hypotension and hypovolemia   will transfer to SICU   continue resuscitation   NICOM for fluid responsiveness   pain control   Repeat EKG and trend troponin

## 2021-09-30 NOTE — PROGRESS NOTE ADULT - ATTENDING COMMENTS
Pt seen and examined at bedside  Hypotension s/p nitropaste application to right mastectomy flap  BP responsive s/p 3 L IVF  Transferred to SICU for further resuscitation  Denies lightheadedness, SOB, CP     HR 66  Gen: NAD  Breast: mastectomy flap + cap refill, sluggish cap refill right UOQ skin, no hematoma or fluid collection, MARIE drains fucntional    Hg 10 from 11    A/P: POD#1 s/p BL DTI breast reconstruction (R subpec, L prepec)  Resolving hypotension 2/2 nitropaste  no hematoma    -c/w IVF maintenance  -c/w pritchard  -c/w abx  -c/w HSQ  -VNS for drain  -will monitor

## 2021-09-30 NOTE — PROGRESS NOTE ADULT - SUBJECTIVE AND OBJECTIVE BOX
JOSE FRANCISCO THOMAS  71y Female   [id number]    Hospital Day: 2  Post Operative Day:1  Procedure:s/p bilateral skin sparing mastectomy , left sln biopsy , right axillary dissection , bilateral insertion of implants -right alloderm     · Operative Findings	right breast, right axillary contents; left breast, left sentinel node #1: 107 (hot and blue); left sentinel node #2: 324 (hot and blue)    · Operative Findings	Right: Total submuscular placement of silicone implant with alloderm matrix   Left: Prepectoral placement of silicone implant    Patient is a 71y old  Female who presents with a chief complaint of   PAST MEDICAL & SURGICAL HISTORY:  HTN (hypertension)    Breast cancer  RIGHT BREAST CA- NEW DX    Cervical cancer  1988. S/P RADICAL HYSTERECTOMY    History of appendectomy    H/O abdominal hysterectomy        Events of the Last 24h: patient hypotensive - 1L LR bolus given , stat labs drawn   Vital Signs Last 24 Hrs  T(C): 37.1 (30 Sep 2021 00:05), Max: 37.1 (30 Sep 2021 00:05)  T(F): 98.8 (30 Sep 2021 00:05), Max: 98.8 (30 Sep 2021 00:05)  HR: 67 (30 Sep 2021 00:05) (49 - 67)  BP: 85/48 (30 Sep 2021 00:05) (85/48 - 147/67)  BP(mean): 77 (29 Sep 2021 15:00) (65 - 77)  RR: 16 (30 Sep 2021 00:05) (11 - 19)  SpO2: 95% (30 Sep 2021 00:05) (95% - 100%)        Diet, Regular (09-29-21 @ 14:20)      I&O's Summary    29 Sep 2021 07:01  -  30 Sep 2021 00:54  --------------------------------------------------------  IN: 595 mL / OUT: 540 mL / NET: 55 mL     I&O's Detail    29 Sep 2021 07:01  -  30 Sep 2021 00:54  --------------------------------------------------------  IN:    IV PiggyBack: 100 mL    Lactated Ringers: 375 mL    Oral Fluid: 120 mL  Total IN: 595 mL    OUT:    Drain (mL): 35 mL    Drain (mL): 70 mL    Drain (mL): 50 mL    Drain (mL): 35 mL    Voided (mL): 350 mL  Total OUT: 540 mL    Total NET: 55 mL          MEDICATIONS  (STANDING):  acetaminophen   Tablet .. 650 milliGRAM(s) Oral every 6 hours  ceFAZolin   IVPB 2000 milliGRAM(s) IV Intermittent every 8 hours  chlorhexidine 4% Liquid 1 Application(s) Topical <User Schedule>  gabapentin 300 milliGRAM(s) Oral three times a day  heparin   Injectable 5000 Unit(s) SubCutaneous every 8 hours  lactated ringers. 1000 milliLiter(s) (125 mL/Hr) IV Continuous <Continuous>  losartan 25 milliGRAM(s) Oral daily  metoprolol succinate ER 25 milliGRAM(s) Oral daily  pantoprazole    Tablet 40 milliGRAM(s) Oral before breakfast    MEDICATIONS  (PRN):  diazepam  Injectable 0.5 milliGRAM(s) IV Push every 8 hours PRN Muscle spasms  traMADol 50 milliGRAM(s) Oral every 8 hours PRN Severe Pain (7 - 10)      PHYSICAL EXAM:    GENERAL: NAD    HEENT: NCAT    CHEST/LUNGS: CTAB    HEART: RRR,  No murmurs, rubs, or gallops    BREAST: Surgical bra in place. Incisions clean dry and intact. Capillary refill <3 secs bilateral breasts.No hematomas.   No bogginess noted. Breast are warm ,good color and good sensation noted.  Bilateral breast tenderness to palpation. 2-elan drains  in each breast with sero sanguionous outpt.    ABDOMEN: SNTND +BS    EXTREMITIES:  FROM, No clubbing, cyanosis, or edema, palpable pulse    NEURO: No focal neurological deficits    SKIN: No rashes or lesions    INCISION/WOUNDS:                          11.4   12.00 )-----------( 218      ( 30 Sep 2021 00:30 )             33.9        CBC Full  -  ( 30 Sep 2021 00:30 )  WBC Count : 12.00 K/uL  RBC Count : 3.90 M/uL  Hemoglobin : 11.4 g/dL  Hematocrit : 33.9 %  Platelet Count - Automated : 218 K/uL  Mean Cell Volume : 86.9 fL  Mean Cell Hemoglobin : 29.2 pg  Mean Cell Hemoglobin Concentration : 33.6 g/dL  Auto Neutrophil # : x  Auto Lymphocyte # : x  Auto Monocyte # : x  Auto Eosinophil # : x  Auto Basophil # : x  Auto Neutrophil % : x  Auto Lymphocyte % : x  Auto Monocyte % : x  Auto Eosinophil % : x  Auto Basophil % : x

## 2021-09-30 NOTE — CONSULT NOTE ADULT - SUBJECTIVE AND OBJECTIVE BOX
SICU Consultation Note  =====================================================  HPI: 71y Female PMHx cervical CA in 1988, HTN, PSHx hysterectomy, appendectomy, found to have breast CA after palpating a lump in her R breast in april. MRI and biopsy 7/2021 revealed ductal carcinoma breast CA stage 2B with mets to R axillary LNs. Patient presented to [facility] on 9/29 for semi elective bilateral mastectomy and breast reconstruction with Dr Ferguson and Dr Barrios. Case was uncomplicated and patient was sent to the floor about 20:00 last night. Per surgical team management at approximatively 22:00 nitropaste was applied to right superior aspect of breast approx [id number] in surface. Shortly after patient found to be hypotensive with pressures as low as 70/40. Nitropaste was removed at 00:00 and a total 2.5L of fluid was administered on the floor by primary team. SICU consult for persistent hypotension.     On SICU exam, patient states she is not feeling well, admits to feeling lighted and weak. Her BP during exam was 88/49 with MAP 62. Patient A&Ox4, HUANG, with palpable DP pulses bilaterally. STAT EKG, CXR and labs sent, labs revealed Hg 11.4 (pre op 14), Cr normal 0.7 (baseline), WBC 12, hypomagnesemia which was repleted.     Surgery Information  OR time: 5hours            EBL: 35cc         IV Fluids: not documented        UOP:    1L    · Operative Findings	right breast, right axillary contents; left breast, left sentinel node #1: 107 (hot and blue); left sentinel node #2: 324 (hot and blue)    · Operative Findings	Right: Total submuscular placement of silicone implant with alloderm matrix   Left: Prepectoral placement of silicone implant      PAST MEDICAL & SURGICAL HISTORY:  HTN (hypertension)    Breast cancer  RIGHT BREAST CA- NEW DX    Cervical cancer  1988. S/P RADICAL HYSTERECTOMY    History of appendectomy    H/O abdominal hysterectomy      Home Meds: Home Medications:  aspirin 81 mg oral tablet: 1 tab(s) orally once a day (29 Sep 2021 06:33)  candesartan 8 mg oral tablet: 2 tab(s) orally once a day (29 Sep 2021 06:33)  CeleBREX:  (29 Sep 2021 06:33)  gabapentin:  (29 Sep 2021 06:33)  metoprolol succinate 25 mg oral capsule, extended release: 1 cap(s) orally once a day (29 Sep 2021 06:33)  Tylenol:  (29 Sep 2021 06:33)    Allergies: Allergies    No Known Allergies    Intolerances      Soc:   Advanced Directives: Presumed Full Code     ROS:    REVIEW OF SYSTEMS    [x] A ten-point review of systems was otherwise negative except as noted.  [ ] Due to altered mental status/intubation, subjective information were not able to be obtained from the patient. History was obtained, to the extent possible, from review of the chart and collateral sources of information.      CURRENT MEDICATIONS:   --------------------------------------------------------------------------------------  Neurologic Medications  acetaminophen   Tablet .. 650 milliGRAM(s) Oral every 6 hours  diazepam  Injectable 0.5 milliGRAM(s) IV Push every 8 hours PRN Muscle spasms  gabapentin 300 milliGRAM(s) Oral three times a day  traMADol 50 milliGRAM(s) Oral every 8 hours PRN Severe Pain (7 - 10)    Respiratory Medications    Cardiovascular Medications  losartan 25 milliGRAM(s) Oral daily  metoprolol succinate ER 25 milliGRAM(s) Oral daily    Gastrointestinal Medications  lactated ringers. 1000 milliLiter(s) IV Continuous <Continuous>  pantoprazole    Tablet 40 milliGRAM(s) Oral before breakfast  sodium chloride 0.9% Bolus 1000 milliLiter(s) IV Bolus once    Genitourinary Medications    Hematologic/Oncologic Medications  heparin   Injectable 5000 Unit(s) SubCutaneous every 8 hours    Antimicrobial/Immunologic Medications  ceFAZolin   IVPB 2000 milliGRAM(s) IV Intermittent every 8 hours    Endocrine/Metabolic Medications    Topical/Other Medications  chlorhexidine 4% Liquid 1 Application(s) Topical <User Schedule>    --------------------------------------------------------------------------------------    VITAL SIGNS, INS/OUTS (last 24 hours):  --------------------------------------------------------------------------------------  ICU Vital Signs Last 24 Hrs  T(C): 37.1 (30 Sep 2021 00:05), Max: 37.1 (30 Sep 2021 00:05)  T(F): 98.8 (30 Sep 2021 00:05), Max: 98.8 (30 Sep 2021 00:05)  HR: 67 (30 Sep 2021 00:05) (49 - 67)  BP: 96/56 (30 Sep 2021 01:37) (80/50 - 147/67)  BP(mean): 77 (29 Sep 2021 15:00) (65 - 77)  ABP: --  ABP(mean): --  RR: 18 (30 Sep 2021 01:22) (11 - 19)  SpO2: 95% (30 Sep 2021 00:05) (95% - 100%)    I&O's Summary    29 Sep 2021 07:01  -  30 Sep 2021 03:07  --------------------------------------------------------  IN: 1470 mL / OUT: 555 mL / NET: 915 mL      --------------------------------------------------------------------------------------    EXAM:  General/Neuro  GCS: 15  Exam: Normal, NAD, alert, oriented x 3, no focal deficits. PERRLA      Respiratory  Exam: Lungs clear to auscultation, Normal expansion/effort.     Cardiovascular  Exam: S1, S2.  Regular rate and rhythm.  no Peripheral edema  Cardiac Rhythm: Normal Sinus Rhythm  ECHO: pending    GI  Exam: Abdomen soft, Non-tender, Non-distended.   Current Diet:  NPO***      Tubes/Lines/Drains  ***  [x] Peripheral IV     [x] Urinary Catheter		Date Placed: 9/30    Extremities  Exam: Extremities warm, pink, well-perfused.        Derm:  Exam: Good skin turgor, no skin breakdown.      :   Exam: Olivares catheter in place.     LABS  --------------------------------------------------------------------------------------  Labs:  CAPILLARY BLOOD GLUCOSE                              11.4   12.00 )-----------( 218      ( 30 Sep 2021 00:30 )             33.9         09-30    133<L>  |  101  |  14  ----------------------------<  122<H>  4.4   |  22  |  0.7      Calcium, Total Serum: 8.4 mg/dL (09-30-21 @ 00:30)      LFTs:         Coags:                  --------------------------------------------------------------------------------------    OTHER LABS    IMAGING RESULTS    < from: NM PET/CT Onc FDG Skull to Thigh, Inital (08.16.21 @ 08:49) >  EXAM:  PETCT ANSHUL ONC FDG INIT            PROCEDURE DATE:  08/16/2021            INTERPRETATION:  JOSE FRANCISCO THOMAS  FDG PET CT STUDY, INITIAL TREATMENT STRATEGY  REASON: TUMOR IMAGING - PET with concurrently acquired CT for attenuation correction and anatomic localization; skull base to mid - thigh / [id number] /Malignant neoplasm of upper-outer quadrant of right breast in female, estrogen receptor  positive / Breast cancer, stage 2, right, initial strategy      HISTORY: This is a 71-year-old patient with newly diagnosed right invasive ductal carcinoma of the breast, estrogen receptor positive  post menopausal F who presents with a self palpated RIGHT breast cancer, [id number], ER/DE pos, Her 2 neg, stage IIB AJCC 8th edition.  On exam, in her right breast, she had a 3 cm mass in the UOQ, no overlying skin changes; resolving ecchymoses from her recent biopsies, no other suspicious breast masses palpated; lymphadenopathy palpated, a 2 cm lymph node that was mobile; no suspicious abnormalities were noted within the left breast.  Pathology showed invasive ductal carcinoma; right axillary mass consistent with metastatic carcinoma  Blood glucose pre injection 85 mg/dL  TECHNIQUE: Approximately 45 minutes after the intravenous administration of 10.6 mCi 18-Fluorine FDG, whole body PET images were acquired from base of skull to mid - thigh. In addition, non-contrast, low dose, non - diagnostic CT was acquired for attenuation correction and anatomic correlation purposes only.  These images reveal pathologic FDG uptake (SUV 7) coregistering with 3.6 x 2 cm right breast mass and pathologic FDG uptake in 2 x 1.1 cm right axillary nodule (SUV 6.1) consistent with documented biologic tumor activity.  No other sites of abnormal FDG uptake    IMPRESSION:  Pathologic FDG uptake (SUV 7) coregistering with 3.6 x 2 cm right breast mass and pathologic FDG uptake in 2 cm right axillary nodule (SUV 6.1) consistent with doc`umented biologic tumor activity.    No other sites of abnormal FDG uptake    NUNO BAUTISTA D.O., M.M.M, C.P.E.      < end of copied text >      Final Diagnosis   1. Breast,right 10-11 N5-8 mass, ultrasound guided needle core   biopsies:   - Invasive moderately differentiated ductal carcinoma with   dominant micropapillary features.   - Ductal carcinoma in-situ (DCIS), cribriform type with comedo   necrosis and microcalcifications, intermediate nuclear grade.   - Foci of lymphovascular invasion are present.   - Pending special studies for ER/DE proteins, proliferation   index (Ki-67), and HER2/FREEMAN oncoprotein expression and/or gene   amplification, with results to be reported as a separate   addendum.   2. Breast, right 11 N10 mass, ultrasound guided needle core   biopsies:   - Benign atrophic fatty breast tissue with a dominant macrocyst   wall fragment demonstrating an attenuated epithelial lining;   consistent with a dilated duct.   3. Breast, right 6 N4 mass, ultrasound guided needle core   biopsies:   - Hyalinized fibroadenoma.   4. Breast, right axillary mass, ultrasound guided needle core   biopsies:   - Lymph node fragments containing metastatic carcinoma (1/1),   the largest contiguous focus of which measures 6.0 mm   (microscopic measurement).   - No extracapsular extension is identified in this biopsy   material.   Verified by: Zan Baker M.D.   (Electronic Signature)   Reported on: 07/22/21 11:09 EDT, [address] [place] [address]      SICU Consultation Note  =====================================================  HPI: 71y Female PMHx cervical CA in 1988, HTN, PSHx hysterectomy, appendectomy, found to have breast CA after palpating a lump in her R breast in april. MRI and biopsy 7/2021 revealed ductal carcinoma breast CA stage 2B with mets to R axillary LNs. Patient presented to [facility] on 9/29 for semi elective bilateral mastectomy and breast reconstruction with Dr Ferguson and Dr Barrios. Case was uncomplicated and patient was sent to the floor about 20:00 last night. Per surgical team management at approximatively 22:00 nitropaste was applied to right superior aspect of breast approx [id number] in surface. Shortly after patient found to be hypotensive with pressures as low as 70/40. Nitropaste was removed at 00:00 and a total 2.5L of fluid was administered on the floor by primary team. SICU consult for persistent hypotension.     On SICU exam, patient states she is not feeling well, admits to feeling lighted and weak. Her BP during exam was 88/49 with MAP 62. Patient A&Ox4, HUANG, with palpable DP pulses bilaterally. STAT EKG, CXR and labs sent, labs revealed Hg 11.4 (pre op 14), Cr normal 0.7 (baseline), WBC 12, hypomagnesemia which was repleted.     Surgery Information  OR time: 5hours            EBL: 35cc         IV Fluids: 2.5L        UOP:    1L    · Operative Findings	right breast, right axillary contents; left breast, left sentinel node #1: 107 (hot and blue); left sentinel node #2: 324 (hot and blue)    · Operative Findings	Right: Total submuscular placement of silicone implant with alloderm matrix   Left: Prepectoral placement of silicone implant      PAST MEDICAL & SURGICAL HISTORY:  HTN (hypertension)    Breast cancer  RIGHT BREAST CA- NEW DX    Cervical cancer  1988. S/P RADICAL HYSTERECTOMY    History of appendectomy    H/O abdominal hysterectomy      Home Meds: Home Medications:  aspirin 81 mg oral tablet: 1 tab(s) orally once a day (29 Sep 2021 06:33)  candesartan 8 mg oral tablet: 2 tab(s) orally once a day (29 Sep 2021 06:33)  CeleBREX:  (29 Sep 2021 06:33)  gabapentin:  (29 Sep 2021 06:33)  metoprolol succinate 25 mg oral capsule, extended release: 1 cap(s) orally once a day (29 Sep 2021 06:33)  Tylenol:  (29 Sep 2021 06:33)    Allergies: Allergies    No Known Allergies    Intolerances      Soc:   Advanced Directives: Presumed Full Code     ROS:    REVIEW OF SYSTEMS    [x] A ten-point review of systems was otherwise negative except as noted.  [ ] Due to altered mental status/intubation, subjective information were not able to be obtained from the patient. History was obtained, to the extent possible, from review of the chart and collateral sources of information.      CURRENT MEDICATIONS:   --------------------------------------------------------------------------------------  Neurologic Medications  acetaminophen   Tablet .. 650 milliGRAM(s) Oral every 6 hours  diazepam  Injectable 0.5 milliGRAM(s) IV Push every 8 hours PRN Muscle spasms  gabapentin 300 milliGRAM(s) Oral three times a day  traMADol 50 milliGRAM(s) Oral every 8 hours PRN Severe Pain (7 - 10)    Respiratory Medications    Cardiovascular Medications  losartan 25 milliGRAM(s) Oral daily  metoprolol succinate ER 25 milliGRAM(s) Oral daily    Gastrointestinal Medications  lactated ringers. 1000 milliLiter(s) IV Continuous <Continuous>  pantoprazole    Tablet 40 milliGRAM(s) Oral before breakfast  sodium chloride 0.9% Bolus 1000 milliLiter(s) IV Bolus once    Genitourinary Medications    Hematologic/Oncologic Medications  heparin   Injectable 5000 Unit(s) SubCutaneous every 8 hours    Antimicrobial/Immunologic Medications  ceFAZolin   IVPB 2000 milliGRAM(s) IV Intermittent every 8 hours    Endocrine/Metabolic Medications    Topical/Other Medications  chlorhexidine 4% Liquid 1 Application(s) Topical <User Schedule>    --------------------------------------------------------------------------------------    VITAL SIGNS, INS/OUTS (last 24 hours):  --------------------------------------------------------------------------------------  ICU Vital Signs Last 24 Hrs  T(C): 37.1 (30 Sep 2021 00:05), Max: 37.1 (30 Sep 2021 00:05)  T(F): 98.8 (30 Sep 2021 00:05), Max: 98.8 (30 Sep 2021 00:05)  HR: 67 (30 Sep 2021 00:05) (49 - 67)  BP: 96/56 (30 Sep 2021 01:37) (80/50 - 147/67)  BP(mean): 77 (29 Sep 2021 15:00) (65 - 77)  ABP: --  ABP(mean): --  RR: 18 (30 Sep 2021 01:22) (11 - 19)  SpO2: 95% (30 Sep 2021 00:05) (95% - 100%)    I&O's Summary    29 Sep 2021 07:01  -  30 Sep 2021 03:07  --------------------------------------------------------  IN: 1470 mL / OUT: 555 mL / NET: 915 mL      --------------------------------------------------------------------------------------    EXAM:  General/Neuro  GCS: 15  Exam: Normal, NAD, alert, oriented x 3, no focal deficits. PERRLA      Respiratory  Exam: Lungs clear to auscultation, Normal expansion/effort.     Cardiovascular  Exam: S1, S2.  Regular rate and rhythm.  no Peripheral edema  Cardiac Rhythm: Normal Sinus Rhythm  ECHO: pending    GI  Exam: Abdomen soft, Non-tender, Non-distended.   Current Diet:  NPO***      Tubes/Lines/Drains  ***  [x] Peripheral IV     [x] Urinary Catheter		Date Placed: 9/30    Extremities  Exam: Extremities warm, pink, well-perfused.        Derm:  Exam: Good skin turgor, no skin breakdown.      :   Exam: Olivares catheter in place.     LABS  --------------------------------------------------------------------------------------  Labs:  CAPILLARY BLOOD GLUCOSE                              11.4   12.00 )-----------( 218      ( 30 Sep 2021 00:30 )             33.9         09-30    133<L>  |  101  |  14  ----------------------------<  122<H>  4.4   |  22  |  0.7      Calcium, Total Serum: 8.4 mg/dL (09-30-21 @ 00:30)      LFTs:         Coags:                  --------------------------------------------------------------------------------------    OTHER LABS    IMAGING RESULTS    < from: NM PET/CT Onc FDG Skull to Thigh, Inital (08.16.21 @ 08:49) >  EXAM:  PETCT ANSHUL ONC FDG INIT            PROCEDURE DATE:  08/16/2021            INTERPRETATION:  JOSE FRANCISCO THOMAS  FDG PET CT STUDY, INITIAL TREATMENT STRATEGY  REASON: TUMOR IMAGING - PET with concurrently acquired CT for attenuation correction and anatomic localization; skull base to mid - thigh / [id number] /Malignant neoplasm of upper-outer quadrant of right breast in female, estrogen receptor  positive / Breast cancer, stage 2, right, initial strategy      HISTORY: This is a 71-year-old patient with newly diagnosed right invasive ductal carcinoma of the breast, estrogen receptor positive  post menopausal F who presents with a self palpated RIGHT breast cancer, [id number], ER/OR pos, Her 2 neg, stage IIB AJCC 8th edition.  On exam, in her right breast, she had a 3 cm mass in the UOQ, no overlying skin changes; resolving ecchymoses from her recent biopsies, no other suspicious breast masses palpated; lymphadenopathy palpated, a 2 cm lymph node that was mobile; no suspicious abnormalities were noted within the left breast.  Pathology showed invasive ductal carcinoma; right axillary mass consistent with metastatic carcinoma  Blood glucose pre injection 85 mg/dL  TECHNIQUE: Approximately 45 minutes after the intravenous administration of 10.6 mCi 18-Fluorine FDG, whole body PET images were acquired from base of skull to mid - thigh. In addition, non-contrast, low dose, non - diagnostic CT was acquired for attenuation correction and anatomic correlation purposes only.  These images reveal pathologic FDG uptake (SUV 7) coregistering with 3.6 x 2 cm right breast mass and pathologic FDG uptake in 2 x 1.1 cm right axillary nodule (SUV 6.1) consistent with documented biologic tumor activity.  No other sites of abnormal FDG uptake    IMPRESSION:  Pathologic FDG uptake (SUV 7) coregistering with 3.6 x 2 cm right breast mass and pathologic FDG uptake in 2 cm right axillary nodule (SUV 6.1) consistent with doc`umented biologic tumor activity.    No other sites of abnormal FDG uptake    NUNO BAUTISTA D.O., M.M.M, C.P.E.      < end of copied text >      Final Diagnosis   1. Breast,right 10-11 N5-8 mass, ultrasound guided needle core   biopsies:   - Invasive moderately differentiated ductal carcinoma with   dominant micropapillary features.   - Ductal carcinoma in-situ (DCIS), cribriform type with comedo   necrosis and microcalcifications, intermediate nuclear grade.   - Foci of lymphovascular invasion are present.   - Pending special studies for ER/OR proteins, proliferation   index (Ki-67), and HER2/FREEMAN oncoprotein expression and/or gene   amplification, with results to be reported as a separate   addendum.   2. Breast, right 11 N10 mass, ultrasound guided needle core   biopsies:   - Benign atrophic fatty breast tissue with a dominant macrocyst   wall fragment demonstrating an attenuated epithelial lining;   consistent with a dilated duct.   3. Breast, right 6 N4 mass, ultrasound guided needle core   biopsies:   - Hyalinized fibroadenoma.   4. Breast, right axillary mass, ultrasound guided needle core   biopsies:   - Lymph node fragments containing metastatic carcinoma (1/1),   the largest contiguous focus of which measures 6.0 mm   (microscopic measurement).   - No extracapsular extension is identified in this biopsy   material.   Verified by: Zan Baker M.D.   (Electronic Signature)   Reported on: 07/22/21 11:09 EDT, [address] [place] [address]

## 2021-09-30 NOTE — ANESTHESIA FOLLOW-UP NOTE - NSINTERVIEW_GEN_ALL_CORE
Allergic/Immunologic: Negative for environmental allergies, food allergies and immunocompromised state. Neurological: Positive for dizziness, weakness and light-headedness. Negative for tremors, seizures, syncope, facial asymmetry, speech difficulty, numbness and headaches. Hematological: Negative for adenopathy. Does not bruise/bleed easily. Psychiatric/Behavioral: Negative. Objective:   Physical Exam   Constitutional: He is oriented to person, place, and time. No distress. HENT:   Head: Normocephalic and atraumatic. Right Ear: Hearing, external ear and ear canal normal. No tenderness. Tympanic membrane is erythematous. Left Ear: Hearing, external ear and ear canal normal. No tenderness. Tympanic membrane is erythematous. Nose: Rhinorrhea present. Right sinus exhibits maxillary sinus tenderness and frontal sinus tenderness. Left sinus exhibits maxillary sinus tenderness and frontal sinus tenderness. Mouth/Throat: Uvula is midline. Posterior oropharyngeal erythema present. No oropharyngeal exudate or posterior oropharyngeal edema. Eyes: Pupils are equal, round, and reactive to light. Conjunctivae are normal.   Neck: Neck supple. Cardiovascular: Normal rate, regular rhythm and normal heart sounds. Pulmonary/Chest: Effort normal. He has wheezes. He has rhonchi. He has no rales. Abdominal: Soft. Bowel sounds are normal. There is no tenderness. Musculoskeletal: He exhibits no edema. Lymphadenopathy:     He has no cervical adenopathy. Neurological: He is alert and oriented to person, place, and time. No cranial nerve deficit. Skin: Skin is warm and dry. He is not diaphoretic. Psychiatric: He has a normal mood and affect. His behavior is normal.     /86 (Site: Left Arm, Position: Sitting, Cuff Size: Large Adult)   Pulse 86   Ht 5' 8\" (1.727 m)   Wt 186 lb (84.4 kg)   SpO2 98%   BMI 28.28 kg/m²     Assessment and plan      1.  Acute rhinosinusitis  Begin Medrol Dosepak, Interviewed and evaluated Augmentin, Tessalon Perles for cough. Obtain chest x-ray, relevant lab work. - methylPREDNISolone (MEDROL DOSEPACK) 4 MG tablet; Take by mouth. Dispense: 1 kit; Refill: 0  - amoxicillin-clavulanate (AUGMENTIN) 875-125 MG per tablet; Take 1 tablet by mouth 2 times daily for 10 days  Dispense: 20 tablet; Refill: 0  - benzonatate (TESSALON PERLES) 100 MG capsule; Take 1 capsule by mouth 3 times daily as needed for Cough  Dispense: 20 capsule; Refill: 0  - CBC Auto Differential; Future    2. Productive cough  - XR CHEST STANDARD (2 VW); Future  - CBC Auto Differential; Future  - Comprehensive Metabolic Panel; Future    3. Elevated LFTs  - Comprehensive Metabolic Panel; Future    4. Elevated hemoglobin (HCC)  - CBC Auto Differential; Future    Call or go to ED immediately if symptoms worsen or persist.    Educational materials and/or home exercises printed for patient's review and were included in patient instructions on his/her After Visit Summary and given to patient at the end of visit.       Counseled regarding above diagnosis, including possible risks and complications, especially if left uncontrolled.     Counseled regarding the possible side effects, risks, benefits and alternatives to treatment; patient and/or guardian verbalizes understanding, agrees, feels comfortable with and wishes to proceed with above treatment plan.     Advised patient to call with any new medication issues, and read all Rx info from pharmacy to assure aware of all possible risks and side effects of medication before taking.     Return in about 2 weeks (around 9/19/2018) for lab review, follow-up.     Melvina Brooks DO  09/05/18  5:25 PM

## 2021-10-01 DIAGNOSIS — N63.10 UNSPECIFIED LUMP IN THE RIGHT BREAST, UNSPECIFIED QUADRANT: ICD-10-CM

## 2021-10-01 DIAGNOSIS — R92.1 MAMMOGRAPHIC CALCIFICATION FOUND ON DIAGNOSTIC IMAGING OF BREAST: ICD-10-CM

## 2021-10-01 LAB
A1C WITH ESTIMATED AVERAGE GLUCOSE RESULT: 5.2 % — SIGNIFICANT CHANGE UP (ref 4–5.6)
ANION GAP SERPL CALC-SCNC: 12 MMOL/L — SIGNIFICANT CHANGE UP (ref 7–14)
APPEARANCE UR: CLEAR — SIGNIFICANT CHANGE UP
BACTERIA # UR AUTO: NEGATIVE — SIGNIFICANT CHANGE UP
BILIRUB UR-MCNC: NEGATIVE — SIGNIFICANT CHANGE UP
BUN SERPL-MCNC: 7 MG/DL — LOW (ref 10–20)
CALCIUM SERPL-MCNC: 8.4 MG/DL — LOW (ref 8.5–10.1)
CHLORIDE SERPL-SCNC: 108 MMOL/L — SIGNIFICANT CHANGE UP (ref 98–110)
CO2 SERPL-SCNC: 23 MMOL/L — SIGNIFICANT CHANGE UP (ref 17–32)
COLOR SPEC: COLORLESS — SIGNIFICANT CHANGE UP
CREAT SERPL-MCNC: 0.7 MG/DL — SIGNIFICANT CHANGE UP (ref 0.7–1.5)
DIFF PNL FLD: NEGATIVE — SIGNIFICANT CHANGE UP
EPI CELLS # UR: 1 /HPF — SIGNIFICANT CHANGE UP (ref 0–5)
ESTIMATED AVERAGE GLUCOSE: 103 MG/DL — SIGNIFICANT CHANGE UP (ref 68–114)
GLUCOSE SERPL-MCNC: 94 MG/DL — SIGNIFICANT CHANGE UP (ref 70–99)
GLUCOSE UR QL: NEGATIVE — SIGNIFICANT CHANGE UP
HCT VFR BLD CALC: 34.2 % — LOW (ref 37–47)
HGB BLD-MCNC: 10.8 G/DL — LOW (ref 12–16)
HYALINE CASTS # UR AUTO: 0 /LPF — SIGNIFICANT CHANGE UP (ref 0–7)
KETONES UR-MCNC: SIGNIFICANT CHANGE UP
LEUKOCYTE ESTERASE UR-ACNC: ABNORMAL
MAGNESIUM SERPL-MCNC: 1.9 MG/DL — SIGNIFICANT CHANGE UP (ref 1.8–2.4)
MCHC RBC-ENTMCNC: 28.6 PG — SIGNIFICANT CHANGE UP (ref 27–31)
MCHC RBC-ENTMCNC: 31.6 G/DL — LOW (ref 32–37)
MCV RBC AUTO: 90.7 FL — SIGNIFICANT CHANGE UP (ref 81–99)
NITRITE UR-MCNC: NEGATIVE — SIGNIFICANT CHANGE UP
NRBC # BLD: 0 /100 WBCS — SIGNIFICANT CHANGE UP (ref 0–0)
PH UR: 7 — SIGNIFICANT CHANGE UP (ref 5–8)
PHOSPHATE SERPL-MCNC: 3 MG/DL — SIGNIFICANT CHANGE UP (ref 2.1–4.9)
PLATELET # BLD AUTO: 184 K/UL — SIGNIFICANT CHANGE UP (ref 130–400)
POTASSIUM SERPL-MCNC: 4.1 MMOL/L — SIGNIFICANT CHANGE UP (ref 3.5–5)
POTASSIUM SERPL-SCNC: 4.1 MMOL/L — SIGNIFICANT CHANGE UP (ref 3.5–5)
PROT UR-MCNC: NEGATIVE — SIGNIFICANT CHANGE UP
RBC # BLD: 3.77 M/UL — LOW (ref 4.2–5.4)
RBC # FLD: 13.1 % — SIGNIFICANT CHANGE UP (ref 11.5–14.5)
RBC CASTS # UR COMP ASSIST: 2 /HPF — SIGNIFICANT CHANGE UP (ref 0–4)
SODIUM SERPL-SCNC: 143 MMOL/L — SIGNIFICANT CHANGE UP (ref 135–146)
SP GR SPEC: 1.01 — SIGNIFICANT CHANGE UP (ref 1.01–1.03)
UROBILINOGEN FLD QL: SIGNIFICANT CHANGE UP
WBC # BLD: 6.33 K/UL — SIGNIFICANT CHANGE UP (ref 4.8–10.8)
WBC # FLD AUTO: 6.33 K/UL — SIGNIFICANT CHANGE UP (ref 4.8–10.8)
WBC UR QL: 14 /HPF — HIGH (ref 0–5)

## 2021-10-01 PROCEDURE — 99291 CRITICAL CARE FIRST HOUR: CPT

## 2021-10-01 PROCEDURE — 71045 X-RAY EXAM CHEST 1 VIEW: CPT | Mod: 26

## 2021-10-01 RX ORDER — SODIUM CHLORIDE 9 MG/ML
250 INJECTION, SOLUTION INTRAVENOUS ONCE
Refills: 0 | Status: COMPLETED | OUTPATIENT
Start: 2021-10-01 | End: 2021-10-01

## 2021-10-01 RX ORDER — SODIUM CHLORIDE 9 MG/ML
500 INJECTION, SOLUTION INTRAVENOUS ONCE
Refills: 0 | Status: COMPLETED | OUTPATIENT
Start: 2021-10-01 | End: 2021-10-01

## 2021-10-01 RX ORDER — MAGNESIUM SULFATE 500 MG/ML
1 VIAL (ML) INJECTION ONCE
Refills: 0 | Status: COMPLETED | OUTPATIENT
Start: 2021-10-01 | End: 2021-10-01

## 2021-10-01 RX ORDER — INFLUENZA VIRUS VACCINE 15; 15; 15; 15 UG/.5ML; UG/.5ML; UG/.5ML; UG/.5ML
0.5 SUSPENSION INTRAMUSCULAR ONCE
Refills: 0 | Status: DISCONTINUED | OUTPATIENT
Start: 2021-10-01 | End: 2021-10-01

## 2021-10-01 RX ADMIN — Medication 100 GRAM(S): at 04:18

## 2021-10-01 RX ADMIN — Medication 650 MILLIGRAM(S): at 12:10

## 2021-10-01 RX ADMIN — PANTOPRAZOLE SODIUM 40 MILLIGRAM(S): 20 TABLET, DELAYED RELEASE ORAL at 05:07

## 2021-10-01 RX ADMIN — CHLORHEXIDINE GLUCONATE 1 APPLICATION(S): 213 SOLUTION TOPICAL at 05:05

## 2021-10-01 RX ADMIN — Medication 650 MILLIGRAM(S): at 01:00

## 2021-10-01 RX ADMIN — TRAMADOL HYDROCHLORIDE 50 MILLIGRAM(S): 50 TABLET ORAL at 11:40

## 2021-10-01 RX ADMIN — Medication 100 MILLIGRAM(S): at 05:05

## 2021-10-01 RX ADMIN — SODIUM CHLORIDE 1000 MILLILITER(S): 9 INJECTION, SOLUTION INTRAVENOUS at 05:04

## 2021-10-01 RX ADMIN — Medication 100 MILLIGRAM(S): at 13:00

## 2021-10-01 RX ADMIN — HEPARIN SODIUM 5000 UNIT(S): 5000 INJECTION INTRAVENOUS; SUBCUTANEOUS at 21:18

## 2021-10-01 RX ADMIN — SODIUM CHLORIDE 3000 MILLILITER(S): 9 INJECTION, SOLUTION INTRAVENOUS at 06:33

## 2021-10-01 RX ADMIN — TRAMADOL HYDROCHLORIDE 50 MILLIGRAM(S): 50 TABLET ORAL at 12:10

## 2021-10-01 RX ADMIN — Medication 650 MILLIGRAM(S): at 23:45

## 2021-10-01 RX ADMIN — SENNA PLUS 2 TABLET(S): 8.6 TABLET ORAL at 21:18

## 2021-10-01 RX ADMIN — HEPARIN SODIUM 5000 UNIT(S): 5000 INJECTION INTRAVENOUS; SUBCUTANEOUS at 13:01

## 2021-10-01 RX ADMIN — Medication 650 MILLIGRAM(S): at 06:44

## 2021-10-01 RX ADMIN — Medication 650 MILLIGRAM(S): at 05:04

## 2021-10-01 RX ADMIN — Medication 650 MILLIGRAM(S): at 17:06

## 2021-10-01 RX ADMIN — Medication 650 MILLIGRAM(S): at 02:00

## 2021-10-01 RX ADMIN — Medication 650 MILLIGRAM(S): at 11:40

## 2021-10-01 RX ADMIN — SODIUM CHLORIDE 3000 MILLILITER(S): 9 INJECTION, SOLUTION INTRAVENOUS at 04:04

## 2021-10-01 RX ADMIN — HEPARIN SODIUM 5000 UNIT(S): 5000 INJECTION INTRAVENOUS; SUBCUTANEOUS at 05:06

## 2021-10-01 RX ADMIN — Medication 100 MILLIGRAM(S): at 21:47

## 2021-10-01 RX ADMIN — Medication 62.5 MILLIMOLE(S): at 06:33

## 2021-10-01 NOTE — PROGRESS NOTE ADULT - ASSESSMENT
ASSESSMENT:  71y F w/ PMHx of  right invasive ductal carcinoma s/p bilateral skin sparing mastectomy, left sln biopsy, right axillary dissection, bilateral insertion of implants -right alloderm    PLAN:  -Monitor MARIE drains, f/u output  -C/w bandeau dressing  -C/w antibiotics  -RD, c/w IVFs  -F/u pritchard output  -F/u PM labs  -Pain control  -WIll need VNS for drain care  -DVT/GI ppx    Lines/Tubes: PIV, [place]/MARIE drains, Pritchard Catheter.    SPECTRA [id number]

## 2021-10-01 NOTE — PHYSICAL THERAPY INITIAL EVALUATION ADULT - PERTINENT HX OF CURRENT PROBLEM, REHAB EVAL
71y F w/ PMHx of  right invasive ductal carcinoma s/p bilateral skin sparing mastectomy, left skln biopsy, right axillary dissection, bilateral insertion of implants -right alloderm

## 2021-10-01 NOTE — PROGRESS NOTE ADULT - SUBJECTIVE AND OBJECTIVE BOX
GENERAL SURGERY PROGRESS NOTE    Patient: JOSE FRANCISCO THOMAS , 71y ([birth year])Female   MRN: [id number]  Location: [facility]  A  Visit: 09-29-21 Inpatient  Date: 10-01-21 @ 00:00    Hospital Day #: 3  Post-Op Day #: 2    Procedure/Dx/Injuries: s/p bilateral skin sparing mastectomy, left ln biopsy, right axillary dissection, bilateral insertion of implants -right alloderm     · Operative Findings	right breast, right axillary contents; left breast, left sentinel node #1: 107 (hot and blue); left sentinel node #2: 324 (hot and blue)    · Operative Findings	Right: Total submuscular placement of silicone implant with alloderm matrix   Left: Prepectoral placement of silicone implant    Events of past 24 hours: Patient upgraded to the SICU for hypotension, NICOM responsive Echo with EF 66%, moderately enlarged right ventricle. Trops (-) x 3, CK downtrending. Lactate improving. Bandeau applied.    PAST MEDICAL & SURGICAL HISTORY:  HTN (hypertension)  Breast cancer RIGHT BREAST CA- NEW DX  Cervical cancer 1988. S/P RADICAL HYSTERECTOMY  History of appendectomy  H/O abdominal hysterectomy    Vitals:   T(F): 99.9 (09-30-21 @ 22:00), Max: 100.7 (09-30-21 @ 20:00)  HR: 71 (09-30-21 @ 23:00)  BP: 121/58 (09-30-21 @ 23:00)  RR: 14 (09-30-21 @ 23:00)  SpO2: 98% (09-30-21 @ 23:00)    Diet, Regular    Fluids:     I & O's:  09-29-21 @ 07:01  -  09-30-21 @ 07:00  --------------------------------------------------------  IN:    IV PiggyBack: 100 mL    Lactated Ringers: 1875 mL    Oral Fluid: 120 mL  Total IN: 2095 mL    OUT:    Drain (mL): 70 mL    Drain (mL): 50 mL    Drain (mL): 35 mL    Drain (mL): 50 mL    Indwelling Catheter - Urethral (mL): 475 mL    Voided (mL): 350 mL  Total OUT: 1030 mL    Total NET: 1065 mL    Bowel Movement: : [] YES [X] NO  Flatus: : [X] YES [] NO    PHYSICAL EXAM:  General: NAD, AAOx3, calm and cooperative  Cardiac: RRR S1, S2  Respiratory: CTAB, normal respiratory effort  Breast: Surgical bra in place, dressing c/d/i. Capillary refill < 3 seconds bilaterally. Skin warm, dry, normal color. Bilateral elan drains x 2 with serosanguinous output.  Abdomen: Soft, non-distended, non-tender, no rebound, no guarding.  Skin: Warm/dry, normal color, no jaundice    MEDICATIONS  (STANDING):  acetaminophen   Tablet .. 650 milliGRAM(s) Oral every 6 hours  ceFAZolin   IVPB 2000 milliGRAM(s) IV Intermittent every 8 hours  chlorhexidine 4% Liquid 1 Application(s) Topical <User Schedule>  heparin   Injectable 5000 Unit(s) SubCutaneous every 8 hours  lactated ringers. 1000 milliLiter(s) (125 mL/Hr) IV Continuous <Continuous>  pantoprazole    Tablet 40 milliGRAM(s) Oral before breakfast  senna 2 Tablet(s) Oral at bedtime    MEDICATIONS  (PRN):  traMADol 50 milliGRAM(s) Oral every 8 hours PRN Severe Pain (7 - 10)    DVT PROPHYLAXIS: heparin   Injectable 5000 Unit(s) SubCutaneous every 8 hours  GI PROPHYLAXIS: pantoprazole    Tablet 40 milliGRAM(s) Oral before breakfast    ANTICOAGULATION:   ANTIBIOTICS:  ceFAZolin   IVPB 2000 milliGRAM(s)    LAB/STUDIES:             10.9   8.12  )-----------( 177      ( 30 Sep 2021 11:20 )             33.7       09-30  136  |  103  |  12  ----------------------------<  112<H>  4.4   |  21  |  0.7    Calcium, Total Serum: 8.1 mg/dL (09-30-21 @ 05:40)    Lactate, Blood: 1.9 mmol/L (09-30-21 @ 16:45)  Lactate, Blood: 2.2 mmol/L (09-30-21 @ 11:20)  Blood Gas Arterial, Lactate: 0.80 mmol/L (09-30-21 @ 06:06)    ABG - ( 30 Sep 2021 06:06 )  pH: 7.42  /  pCO2: 39    /  pO2: 126   / HCO3: 25    / Base Excess: 0.8   /  SaO2: 100.0     Coags:  13.40  ----< 1.17    ( 30 Sep 2021 05:40 )     28.4     CARDIAC MARKERS ( 30 Sep 2021 16:45 )  x     / <0.01 ng/mL / 368 U/L / x     / 3.1 ng/mL  CARDIAC MARKERS ( 30 Sep 2021 11:20 )  x     / <0.01 ng/mL / 409 U/L / x     / 5.4 ng/mL  CARDIAC MARKERS ( 30 Sep 2021 05:40 )  x     / <0.01 ng/mL / 426 U/L / x     / 6.3 ng/mL    IMAGING:  < from: Xray Chest 1 View-PORTABLE IMMEDIATE (Xray Chest 1 View-PORTABLE IMMEDIATE .) (09.30.21 @ 07:08) >  Impression:    Mild pulmonary vascular congestion. No pleural effusion parenchymal opacity or air leak  < end of copied text >    ACCESS/ DEVICES:  [ X ] Peripheral IV  [ ] Central Venous Line	[ ] R	[ ] L	[ ] IJ	[ ] Fem	[ ] SC	Placed:   [ ] Arterial Line		[ ] R	[ ] L	[ ] Fem	[ ] Rad	[ ] Ax	Placed:   [ ] PICC:					[ ] Mediport  [ X ] Urinary Catheter,  Date Placed:   [ ] Chest tube: [ ] Right, [ ] Left  [ X ] MARIE/Elan Drains

## 2021-10-01 NOTE — PROGRESS NOTE ADULT - ATTENDING COMMENTS
Pt seen and examined at bedside   No acute issues.  Improving BP s/p 2 L  Excellent UOP  c/o axillary pain with bandeau  denies fevers, chills.  pain controlled    AVSS Tm 100.4    Gen: NAD, AAOx3, edematous  Breast: bilateral mastectomy flaps + cap refill; no hematoma, MARIE drains functional  BLE: SCDs in place    A/P:  POD#2 s/p BL DTI breast reconstruction.  Resolving post-op hypovolemia s/p IVF resuscitation    -Cleared from PRS standpoint for DC home pending Breast clearance  -PT   -OOB to chair and ambulation  -Incentive spirometry  -consider DC pritchard  -HSQ  -Ancef  -VNS for MARIE drain care

## 2021-10-01 NOTE — PROGRESS NOTE ADULT - SUBJECTIVE AND OBJECTIVE BOX
GENERAL SURGERY PROGRESS NOTE    Patient: JOSE FRANCISCO THOMAS , 71y ([birth year])Female   MRN: [id number]  Location: [facility]  A  Visit: 09-29-21 Inpatient  Date: 10-01-21 @ 00:00    Hospital Day #: 3  Post-Op Day #: 2    Procedure/Dx/Injuries: s/p bilateral skin sparing mastectomy, left ln biopsy, right axillary dissection, bilateral insertion of implants -right alloderm     · Operative Findings	right breast, right axillary contents; left breast, left sentinel node #1: 107 (hot and blue); left sentinel node #2: 324 (hot and blue)    · Operative Findings	Right: Total submuscular placement of silicone implant with alloderm matrix   Left: Prepectoral placement of silicone implant    Events of past 24 hours: Patient upgraded to the SICU for hypotension, NICOM responsive Echo with EF 66%, moderately enlarged right ventricle. Trops (-) x 3, CK downtrending. Lactate improving. Bandeau applied.    PAST MEDICAL & SURGICAL HISTORY:  HTN (hypertension)  Breast cancer RIGHT BREAST CA- NEW DX  Cervical cancer 1988. S/P RADICAL HYSTERECTOMY  History of appendectomy  H/O abdominal hysterectomy    Vitals:   T(F): 99.9 (09-30-21 @ 22:00), Max: 100.7 (09-30-21 @ 20:00)  HR: 71 (09-30-21 @ 23:00)  BP: 121/58 (09-30-21 @ 23:00)  RR: 14 (09-30-21 @ 23:00)  SpO2: 98% (09-30-21 @ 23:00)    Diet, Regular    Fluids:     I & O's:  09-29-21 @ 07:01  -  09-30-21 @ 07:00  --------------------------------------------------------  IN:    IV PiggyBack: 100 mL    Lactated Ringers: 1875 mL    Oral Fluid: 120 mL  Total IN: 2095 mL    OUT:    Drain (mL): 70 mL    Drain (mL): 50 mL    Drain (mL): 35 mL    Drain (mL): 50 mL    Indwelling Catheter - Urethral (mL): 475 mL    Voided (mL): 350 mL  Total OUT: 1030 mL    Total NET: 1065 mL    Bowel Movement: : [] YES [X] NO  Flatus: : [X] YES [] NO    PHYSICAL EXAM:  General: NAD, AAOx3, calm and cooperative  Cardiac: RRR S1, S2  Respiratory: CTAB, normal respiratory effort  Breast: Surgical bra in place, dressing c/d/i. Capillary refill < 3 seconds bilaterally. Skin warm, dry, normal color. Bilateral elan drains x 2 with serosanguinous output.  Abdomen: Soft, non-distended, non-tender, no rebound, no guarding.  Skin: Warm/dry, normal color, no jaundice    MEDICATIONS  (STANDING):  acetaminophen   Tablet .. 650 milliGRAM(s) Oral every 6 hours  ceFAZolin   IVPB 2000 milliGRAM(s) IV Intermittent every 8 hours  chlorhexidine 4% Liquid 1 Application(s) Topical <User Schedule>  heparin   Injectable 5000 Unit(s) SubCutaneous every 8 hours  lactated ringers. 1000 milliLiter(s) (125 mL/Hr) IV Continuous <Continuous>  pantoprazole    Tablet 40 milliGRAM(s) Oral before breakfast  senna 2 Tablet(s) Oral at bedtime    MEDICATIONS  (PRN):  traMADol 50 milliGRAM(s) Oral every 8 hours PRN Severe Pain (7 - 10)    DVT PROPHYLAXIS: heparin   Injectable 5000 Unit(s) SubCutaneous every 8 hours  GI PROPHYLAXIS: pantoprazole    Tablet 40 milliGRAM(s) Oral before breakfast    ANTICOAGULATION:   ANTIBIOTICS:  ceFAZolin   IVPB 2000 milliGRAM(s)    LAB/STUDIES:             10.9   8.12  )-----------( 177      ( 30 Sep 2021 11:20 )             33.7       09-30  136  |  103  |  12  ----------------------------<  112<H>  4.4   |  21  |  0.7    Calcium, Total Serum: 8.1 mg/dL (09-30-21 @ 05:40)    Lactate, Blood: 1.9 mmol/L (09-30-21 @ 16:45)  Lactate, Blood: 2.2 mmol/L (09-30-21 @ 11:20)  Blood Gas Arterial, Lactate: 0.80 mmol/L (09-30-21 @ 06:06)    ABG - ( 30 Sep 2021 06:06 )  pH: 7.42  /  pCO2: 39    /  pO2: 126   / HCO3: 25    / Base Excess: 0.8   /  SaO2: 100.0     Coags:  13.40  ----< 1.17    ( 30 Sep 2021 05:40 )     28.4     CARDIAC MARKERS ( 30 Sep 2021 16:45 )  x     / <0.01 ng/mL / 368 U/L / x     / 3.1 ng/mL  CARDIAC MARKERS ( 30 Sep 2021 11:20 )  x     / <0.01 ng/mL / 409 U/L / x     / 5.4 ng/mL  CARDIAC MARKERS ( 30 Sep 2021 05:40 )  x     / <0.01 ng/mL / 426 U/L / x     / 6.3 ng/mL    IMAGING:  < from: Xray Chest 1 View-PORTABLE IMMEDIATE (Xray Chest 1 View-PORTABLE IMMEDIATE .) (09.30.21 @ 07:08) >  Impression:    Mild pulmonary vascular congestion. No pleural effusion parenchymal opacity or air leak  < end of copied text >    ACCESS/ DEVICES:  [ X ] Peripheral IV  [ ] Central Venous Line	[ ] R	[ ] L	[ ] IJ	[ ] Fem	[ ] SC	Placed:   [ ] Arterial Line		[ ] R	[ ] L	[ ] Fem	[ ] Rad	[ ] Ax	Placed:   [ ] PICC:					[ ] Mediport  [ X ] Urinary Catheter,  Date Placed:   [ ] Chest tube: [ ] Right, [ ] Left  [ X ] MARIE/Elan Drains

## 2021-10-01 NOTE — CHART NOTE - NSCHARTNOTEFT_GEN_A_CORE
SICU Transfer Note    JOSE FRANCISCO THOMAS  71y (1950)  [id number]      Transfer from: SICU  Transfer to: Surgery-    HPI: 71y Female PMHx cervical CA in , HTN, PSHx hysterectomy, appendectomy, found to have breast CA after palpating a lump in her R breast in april. MRI and biopsy 2021 revealed ductal carcinoma breast CA stage 2B with mets to R axillary LNs. Patient presented to [facility] on  for semi elective bilateral mastectomy and breast reconstruction with Dr Ferguson and Dr Barrios. Case was uncomplicated and patient was sent to the floor about 20:00 last night. Per surgical team management at approximatively 22:00 nitropaste was applied to right superior aspect of breast approx [id number] in surface. Shortly after patient found to be hypotensive with pressures as low as 70/40. Nitropaste was removed at 00:00 and a total 2.5L of fluid was administered on the floor by primary team. SICU consult for persistent hypotension.     On SICU exam, patient states she is not feeling well, admits to feeling lighted and weak. Her BP during exam was 88/49 with MAP 62. Patient A&Ox4, HUANG, with palpable DP pulses bilaterally. STAT EKG, CXR and labs sent, labs revealed Hg 11.4 (pre op 14), Cr normal 0.7 (baseline), WBC 12, hypomagnesemia which was repleted.     SICU COURSE:  Patient found to be persistently hypotensive on POD  71y Female HD# 2d    s/p Right mastectomy with lymph node dissection    Left mastectomy with sentinel lymph node biopsy    Reconstruction of both breasts with insertion of silicone implants    Implantation of acellular dermal matrix for breast reconstruction        PAST MEDICAL & SURGICAL HISTORY:  HTN (hypertension)    Breast cancer  RIGHT BREAST CA- NEW DX    Cervical cancer  . S/P RADICAL HYSTERECTOMY    History of appendectomy    H/O abdominal hysterectomy      Allergies    No Known Allergies    Intolerances      MEDICATIONS  (STANDING):  acetaminophen   Tablet .. 650 milliGRAM(s) Oral every 6 hours  ceFAZolin   IVPB 2000 milliGRAM(s) IV Intermittent every 8 hours  chlorhexidine 4% Liquid 1 Application(s) Topical <User Schedule>  heparin   Injectable 5000 Unit(s) SubCutaneous every 8 hours  pantoprazole    Tablet 40 milliGRAM(s) Oral before breakfast  senna 2 Tablet(s) Oral at bedtime    MEDICATIONS  (PRN):  traMADol 50 milliGRAM(s) Oral every 8 hours PRN Severe Pain (7 - 10)      Vital Signs Last 24 Hrs  T(C): 37.3 (01 Oct 2021 08:17), Max: 38.5 (01 Oct 2021 01:00)  T(F): 99.1 (01 Oct 2021 08:17), Max: 101.3 (01 Oct 2021 01:00)  HR: 71 (01 Oct 2021 11:00) (65 - 82)  BP: 132/62 (01 Oct 2021 11:00) (91/44 - 136/62)  BP(mean): 89 (01 Oct 2021 11:) (64 - 89)  RR: 23 (01 Oct 2021 11:00) (14 - 26)  SpO2: 98% (01 Oct 2021 11:) (96% - 100%)  I&O's Summary    30 Sep 2021 07:01  -  01 Oct 2021 07:00  --------------------------------------------------------  IN: 7800 mL / OUT: 6393 mL / NET: 1407 mL    01 Oct 2021 07:01  -  01 Oct 2021 11:30  --------------------------------------------------------  IN: 745 mL / OUT: 1240 mL / NET: -495 mL        LABS  LABS:                        10.8   6.33  )-----------( 184      ( 01 Oct 2021 00:03 )             34.2       10-    143  |  108  |  7<L>  ----------------------------<  94  4.1   |  23  |  0.7    Ca    8.4<L>      01 Oct 2021 00:03  Phos  3.0     10-01  Mg     1.9     10-01        PT/INR - ( 30 Sep 2021 05:40 )   PT: 13.40 sec;   INR: 1.17 ratio         PTT - ( 30 Sep 2021 05:40 )  PTT:28.4 sec  CARDIAC MARKERS ( 30 Sep 2021 16:45 )  x     / <0.01 ng/mL / 368 U/L / x     / 3.1 ng/mL  CARDIAC MARKERS ( 30 Sep 2021 11:20 )  x     / <0.01 ng/mL / 409 U/L / x     / 5.4 ng/mL  CARDIAC MARKERS ( 30 Sep 2021 05:40 )  x     / <0.01 ng/mL / 426 U/L / x     / 6.3 ng/mL      bnp    Urinalysis Basic - ( 01 Oct 2021 07:35 )    Color: Colorless / Appearance: Clear / S.008 / pH: x  Gluc: x / Ketone: Trace  / Bili: Negative / Urobili: <2 mg/dL   Blood: x / Protein: Negative / Nitrite: Negative   Leuk Esterase: Large / RBC: 2 /HPF / WBC 14 /HPF   Sq Epi: x / Non Sq Epi: 1 /HPF / Bacteria: Negative                Assessment & Plan:          Follow Up:  -2330 labs        Signed out to:  Date:  Time: SICU Transfer Note    JOSE FRANCISCO THOMAS  71y (1950)  [id number]      Transfer from: SICU  Transfer to: Surgery-    HPI: 71y Female PMHx cervical CA in , HTN, PSHx hysterectomy, appendectomy, found to have breast CA after palpating a lump in her R breast in april. MRI and biopsy 2021 revealed ductal carcinoma breast CA stage 2B with mets to R axillary LNs. Patient presented to [facility] on  for semi elective bilateral mastectomy and breast reconstruction with Dr Ferguson and Dr Barrios. Case was uncomplicated and patient was sent to the floor about 20:00 last night. Per surgical team management at approximatively 22:00 nitropaste was applied to right superior aspect of breast approx [id number] in surface. Shortly after patient found to be hypotensive with pressures as low as 70/40. Nitropaste was removed at 00:00 and a total 2.5L of fluid was administered on the floor by primary team. SICU consult for persistent hypotension.     On SICU exam, patient states she is not feeling well, admits to feeling lighted and weak. Her BP during exam was 88/49 with MAP 62. Patient A&Ox4, HUANG, with palpable DP pulses bilaterally. STAT EKG, CXR and labs sent, labs revealed Hg 11.4 (pre op 14), Cr normal 0.7 (baseline), WBC 12, hypomagnesemia which was repleted.     SICU COURSE:  Patient found to be persistently hypotensive on POD1 found to be NICOM responsive total of 3.5L. POD#2 normotensive, pritchard discontinued TOV 1800, fluids d/c'ed as patient tolerating PO. Patient found to have one time fever of 101.3 at 0100 on 10/1/21, UA sent,  neg. Patient ambulating, currently stable for the floor.    71y Female HD# 2d    s/p Right mastectomy with lymph node dissection    Left mastectomy with sentinel lymph node biopsy    Reconstruction of both breasts with insertion of silicone implants    Implantation of acellular dermal matrix for breast reconstruction        PAST MEDICAL & SURGICAL HISTORY:  HTN (hypertension)    Breast cancer  RIGHT BREAST CA- NEW DX    Cervical cancer  . S/P RADICAL HYSTERECTOMY    History of appendectomy    H/O abdominal hysterectomy      Allergies    No Known Allergies    Intolerances      MEDICATIONS  (STANDING):  acetaminophen   Tablet .. 650 milliGRAM(s) Oral every 6 hours  ceFAZolin   IVPB 2000 milliGRAM(s) IV Intermittent every 8 hours  chlorhexidine 4% Liquid 1 Application(s) Topical <User Schedule>  heparin   Injectable 5000 Unit(s) SubCutaneous every 8 hours  pantoprazole    Tablet 40 milliGRAM(s) Oral before breakfast  senna 2 Tablet(s) Oral at bedtime    MEDICATIONS  (PRN):  traMADol 50 milliGRAM(s) Oral every 8 hours PRN Severe Pain (7 - 10)      Vital Signs Last 24 Hrs  T(C): 37.3 (01 Oct 2021 08:17), Max: 38.5 (01 Oct 2021 01:00)  T(F): 99.1 (01 Oct 2021 08:17), Max: 101.3 (01 Oct 2021 01:00)  HR: 71 (01 Oct 2021 11:) (65 - 82)  BP: 132/62 (01 Oct 2021 11:00) (91/44 - 136/62)  BP(mean): 89 (01 Oct 2021 11:) (64 - 89)  RR: 23 (01 Oct 2021 11:00) (14 - 26)  SpO2: 98% (01 Oct 2021 11:00) (96% - 100%)  I&O's Summary    30 Sep 2021 07:01  -  01 Oct 2021 07:00  --------------------------------------------------------  IN: 7800 mL / OUT: 6393 mL / NET: 1407 mL    01 Oct 2021 07:01  -  01 Oct 2021 11:30  --------------------------------------------------------  IN: 745 mL / OUT: 1240 mL / NET: -495 mL        LABS  LABS:                        10.8   6.33  )-----------( 184      ( 01 Oct 2021 00:03 )             34.2       10-    143  |  108  |  7<L>  ----------------------------<  94  4.1   |  23  |  0.7    Ca    8.4<L>      01 Oct 2021 00:03  Phos  3.0     10-01  Mg     1.9     10-01        PT/INR - ( 30 Sep 2021 05:40 )   PT: 13.40 sec;   INR: 1.17 ratio         PTT - ( 30 Sep 2021 05:40 )  PTT:28.4 sec  CARDIAC MARKERS ( 30 Sep 2021 16:45 )  x     / <0.01 ng/mL / 368 U/L / x     / 3.1 ng/mL  CARDIAC MARKERS ( 30 Sep 2021 11:20 )  x     / <0.01 ng/mL / 409 U/L / x     / 5.4 ng/mL  CARDIAC MARKERS ( 30 Sep 2021 05:40 )  x     / <0.01 ng/mL / 426 U/L / x     / 6.3 ng/mL      bnp    Urinalysis Basic - ( 01 Oct 2021 07:35 )    Color: Colorless / Appearance: Clear / S.008 / pH: x  Gluc: x / Ketone: Trace  / Bili: Negative / Urobili: <2 mg/dL   Blood: x / Protein: Negative / Nitrite: Negative   Leuk Esterase: Large / RBC: 2 /HPF / WBC 14 /HPF   Sq Epi: x / Non Sq Epi: 1 /HPF / Bacteria: Negative                Assessment & Plan:    71y Female PMHx cervical CA in , HTN, PSHx hysterectomy, appendectomy, found to have breast CA after palpating a lump in her R breast in april. MRI and biopsy 2021 revealed ductal carcinoma breast CA stage 2B with mets to R axillary LNs. Patient admitted on  and is now POD#2 s/p bilateral skin sparing mastectomy, left sln biopsy, right axillary dissection, bilateral insertion of implants (right alloderm). SICU consult for hypotension POD#1. Patient upgraded to SICU from 4c.    NEURO:    Acute pain-controlled with Tylenol gabapentin 300 q8hr, holding prn valium & tramadol prn    RESP:     Currently on room air saturating 100%     Activity-ambulate as tolerated    CXR mild pulmonary congestion, stable     CARDS:   Acute post op hypotension, improved  -nicom responsive, 3L fluids overnight, total of 3.5L   -EKG unchanged  -trops negx3, CK downtrending   -Echo: LV ejection 66%, moderately enlarged right ventricle, no mitral regurge, mild-moderate tricuspid regurgitation, mild to moderate pulmonic valve regurgitation  Cardiologist: Dr Robert  - Home medications on hold: candesartan, metoprolol 25xl, ASA 81mg    GI/NUTR:     Diet, Regular   fluids discontinue,     GI Prophylaxis- PPI PO    Bowel regimen- senna, add miralax prn    /RENAL:   Monitor UO  pritchard removed TOV 1800  lactate 0.8 --> 2.2 --> 1.9    Labs:          BUN/Cr- 14/0.7  -->,  12/0.7  -->,  7/0.7  -->          Electrolytes-Na 143 // K 4.1 // Mg 1.9 //  Phos 3.0 (10-01 @ 00:03)  -repleted Mg & Phos    HEME/ONC:   DVT prophylaxis-heparin Injectable, SCDs    Labs: Hb/Hct:  10.9/33.7  -->,  10.8/34.2  -->                      Plts:  184  -->,  177  -->                 PTT/INR:  28.4/1.17  ( @ 05:40) --->                 T&S: ()    Breast CA, R breast with mets to R axillary LN (stage 2b)  - palpated breast lump in april, MRI and biopsy confirmed stage 2b breast CA  - now s/p bilateral mastectomy, with R axillary LN dissection, L sentinel node bx, bilateral breast implantation, MARIE drains x4  [ ] monitor MARIE drain output, minimal  - Dr Tejada is Oncologist plan to start chemotherapy and radiation post operatively    ID:  WBC- 12.00  --->> likely reactive post op --->>9.13 --->>8.12-->6.3  Tmax 101.3  Antibiotics-ceFAZolin   IVPB 2000 every 8 hours    ENDO:   HA1C pending ordered 10/1 AM    LINES/DRAINS:  PIV, Pritchard, MARIE drains x4     DISPO:   downgrade           Follow Up: TOV, monitor BPs   -2330 labs        Signed out to:  Date:  Time: SICU Transfer Note    JOSE FRANCISCO THOMAS  71y (1950)  [id number]      Transfer from: SICU  Transfer to: Surgery-    HPI: 71y Female PMHx cervical CA in , HTN, PSHx hysterectomy, appendectomy, found to have breast CA after palpating a lump in her R breast in april. MRI and biopsy 2021 revealed ductal carcinoma breast CA stage 2B with mets to R axillary LNs. Patient presented to [facility] on  for semi elective bilateral mastectomy and breast reconstruction with Dr Ferguson and Dr Barrios. Case was uncomplicated and patient was sent to the floor about 20:00 last night. Per surgical team management at approximatively 22:00 nitropaste was applied to right superior aspect of breast approx [id number] in surface. Shortly after patient found to be hypotensive with pressures as low as 70/40. Nitropaste was removed at 00:00 and a total 2.5L of fluid was administered on the floor by primary team. SICU consult for persistent hypotension.     On SICU exam, patient states she is not feeling well, admits to feeling lighted and weak. Her BP during exam was 88/49 with MAP 62. Patient A&Ox4, HUANG, with palpable DP pulses bilaterally. STAT EKG, CXR and labs sent, labs revealed Hg 11.4 (pre op 14), Cr normal 0.7 (baseline), WBC 12, hypomagnesemia which was repleted.     SICU COURSE:  Patient found to be persistently hypotensive on POD1 found to be NICOM responsive total of 3.5L. POD#2 normotensive, pritchard discontinued TOV 1800, fluids d/c'ed as patient tolerating PO. Patient found to have one time fever of 101.3 at 0100 on 10/1/21, UA sent,  neg. Patient ambulating, currently stable for the floor.    71y Female HD# 2d    s/p Right mastectomy with lymph node dissection    Left mastectomy with sentinel lymph node biopsy    Reconstruction of both breasts with insertion of silicone implants    Implantation of acellular dermal matrix for breast reconstruction        PAST MEDICAL & SURGICAL HISTORY:  HTN (hypertension)    Breast cancer  RIGHT BREAST CA- NEW DX    Cervical cancer  . S/P RADICAL HYSTERECTOMY    History of appendectomy    H/O abdominal hysterectomy      Allergies    No Known Allergies    Intolerances      MEDICATIONS  (STANDING):  acetaminophen   Tablet .. 650 milliGRAM(s) Oral every 6 hours  ceFAZolin   IVPB 2000 milliGRAM(s) IV Intermittent every 8 hours  chlorhexidine 4% Liquid 1 Application(s) Topical <User Schedule>  heparin   Injectable 5000 Unit(s) SubCutaneous every 8 hours  pantoprazole    Tablet 40 milliGRAM(s) Oral before breakfast  senna 2 Tablet(s) Oral at bedtime    MEDICATIONS  (PRN):  traMADol 50 milliGRAM(s) Oral every 8 hours PRN Severe Pain (7 - 10)      Vital Signs Last 24 Hrs  T(C): 37.3 (01 Oct 2021 08:17), Max: 38.5 (01 Oct 2021 01:00)  T(F): 99.1 (01 Oct 2021 08:17), Max: 101.3 (01 Oct 2021 01:00)  HR: 71 (01 Oct 2021 11:) (65 - 82)  BP: 132/62 (01 Oct 2021 11:00) (91/44 - 136/62)  BP(mean): 89 (01 Oct 2021 11:) (64 - 89)  RR: 23 (01 Oct 2021 11:00) (14 - 26)  SpO2: 98% (01 Oct 2021 11:00) (96% - 100%)  I&O's Summary    30 Sep 2021 07:01  -  01 Oct 2021 07:00  --------------------------------------------------------  IN: 7800 mL / OUT: 6393 mL / NET: 1407 mL    01 Oct 2021 07:01  -  01 Oct 2021 11:30  --------------------------------------------------------  IN: 745 mL / OUT: 1240 mL / NET: -495 mL        LABS  LABS:                        10.8   6.33  )-----------( 184      ( 01 Oct 2021 00:03 )             34.2       10-    143  |  108  |  7<L>  ----------------------------<  94  4.1   |  23  |  0.7    Ca    8.4<L>      01 Oct 2021 00:03  Phos  3.0     10-01  Mg     1.9     10-01        PT/INR - ( 30 Sep 2021 05:40 )   PT: 13.40 sec;   INR: 1.17 ratio         PTT - ( 30 Sep 2021 05:40 )  PTT:28.4 sec  CARDIAC MARKERS ( 30 Sep 2021 16:45 )  x     / <0.01 ng/mL / 368 U/L / x     / 3.1 ng/mL  CARDIAC MARKERS ( 30 Sep 2021 11:20 )  x     / <0.01 ng/mL / 409 U/L / x     / 5.4 ng/mL  CARDIAC MARKERS ( 30 Sep 2021 05:40 )  x     / <0.01 ng/mL / 426 U/L / x     / 6.3 ng/mL      bnp    Urinalysis Basic - ( 01 Oct 2021 07:35 )    Color: Colorless / Appearance: Clear / S.008 / pH: x  Gluc: x / Ketone: Trace  / Bili: Negative / Urobili: <2 mg/dL   Blood: x / Protein: Negative / Nitrite: Negative   Leuk Esterase: Large / RBC: 2 /HPF / WBC 14 /HPF   Sq Epi: x / Non Sq Epi: 1 /HPF / Bacteria: Negative                Assessment & Plan:    71y Female PMHx cervical CA in , HTN, PSHx hysterectomy, appendectomy, found to have breast CA after palpating a lump in her R breast in april. MRI and biopsy 2021 revealed ductal carcinoma breast CA stage 2B with mets to R axillary LNs. Patient admitted on  and is now POD#2 s/p bilateral skin sparing mastectomy, left sln biopsy, right axillary dissection, bilateral insertion of implants (right alloderm). SICU consult for hypotension POD#1. Patient upgraded to SICU from 4c.    NEURO:    Acute pain-controlled with Tylenol gabapentin 300 q8hr, holding prn valium & tramadol prn    RESP:     Currently on room air saturating 100%     Activity-ambulate as tolerated    CXR mild pulmonary congestion, stable     CARDS:   Acute post op hypotension, improved  -nicom responsive, 3L fluids overnight, total of 3.5L   -EKG unchanged  -trops negx3, CK downtrending   -Echo: LV ejection 66%, moderately enlarged right ventricle, no mitral regurge, mild-moderate tricuspid regurgitation, mild to moderate pulmonic valve regurgitation  Cardiologist: Dr Robert  - Home medications on hold: candesartan, metoprolol 25xl, ASA 81mg    GI/NUTR:     Diet, Regular   fluids discontinue,     GI Prophylaxis- PPI PO    Bowel regimen- senna, add miralax prn    /RENAL:   Monitor UO  pritchard removed TOV 1800  lactate 0.8 --> 2.2 --> 1.9    Labs:          BUN/Cr- 14/0.7  -->,  12/0.7  -->,  7/0.7  -->          Electrolytes-Na 143 // K 4.1 // Mg 1.9 //  Phos 3.0 (10-01 @ 00:03)  -repleted Mg & Phos    HEME/ONC:   DVT prophylaxis-heparin Injectable, SCDs    Labs: Hb/Hct:  10.9/33.7  -->,  10.8/34.2  -->                      Plts:  184  -->,  177  -->                 PTT/INR:  28.4/1.17  ( @ 05:40) --->                 T&S: ()    Breast CA, R breast with mets to R axillary LN (stage 2b)  - palpated breast lump in april, MRI and biopsy confirmed stage 2b breast CA  - now s/p bilateral mastectomy, with R axillary LN dissection, L sentinel node bx, bilateral breast implantation, MARIE drains x4  [ ] monitor MARIE drain output, minimal  - Dr Tejada is Oncologist plan to start chemotherapy and radiation post operatively    ID:  WBC- 12.00  --->> likely reactive post op --->>9.13 --->>8.12-->6.3  Tmax 101.3  Antibiotics-ceFAZolin   IVPB 2000 every 8 hours    ENDO:   HA1C pending ordered 10/1 AM    LINES/DRAINS:  PIV, Pritchard, MARIE drains x4     DISPO:   downgrade           Follow Up: TOV, monitor BPs   -2330 labs        Signed out to: Dr. Gonzalez  Date: 10/1/21  Time: 1200

## 2021-10-01 NOTE — PROGRESS NOTE ADULT - ASSESSMENT
Assessment & Plan  71y Female PMHx cervical CA in 1988, HTN, PSHx hysterectomy, appendectomy, found to have breast CA after palpating a lump in her R breast in april. MRI and biopsy 7/2021 revealed ductal carcinoma breast CA stage 2B with mets to R axillary LNs. Patient admitted on 9/29 and is now POD#1 s/p bilateral skin sparing mastectomy, left sln biopsy, right axillary dissection, bilateral insertion of implants (right alloderm). SICU consult for hypotension POD#1. Patient upgraded to SICU from .    NEURO:    Acute pain-controlled with Tylenol gabapentin 300 q8hr, holding prn valium & tramadol prn    RESP:     Oxygen insufficiency-wean off NC to RA as tolerate currently on 2L NC    Activity-ambulate as tolerated    CXR mild pulmonary congestion    CARDS:   Acute post op hypotension, improved  -nicom responsive, 3L fluids overnight  -EKG unchanged  -trops negx3, CK downtrending   -Echo: LV ejection 66%, moderately enlarged right ventricle, no mitral regurge, mild-moderate tricuspid regurgitation, mild to moderate pulmonic valve regurgitation  Cardiologist: Dr Robert  - Home medications on hold: candesartan, metoprolol 25xl, ASA 81mg    GI/NUTR:     Diet, Regular    continue with IVF LR @ 125cc/hr    GI Prophylaxis- PPI PO    Bowel regimen- senna, add miralax prn    /RENAL:   Monitor UO  placed pritchard for strict i&os, in setting of hypotension  lactate 0.8 --> 2.2 --> 1.9    Labs:          BUN/Cr- 14/0.7  -->,  12/0.7  -->,  7/0.7  -->          Electrolytes-Na 143 // K 4.1 // Mg 1.9 //  Phos 3.0 (10-01 @ 00:03)  -repleted Mg & Phos    HEME/ONC:   DVT prophylaxis-heparin Injectable, SCDs    Labs: Hb/Hct:  10.9/33.7  -->,  10.8/34.2  -->                      Plts:  184  -->,  177  -->                 PTT/INR:  28.4/1.17  (09-30 @ 05:40) --->                 T&S: (09-29)    Breast CA, R breast with mets to R axillary LN (stage 2b)  - palpated breast lump in april, MRI and biopsy confirmed stage 2b breast CA  - now s/p bilateral mastectomy, with R axillary LN dissection, L sentinel node bx, bilateral breast implantation, MARIE drains x4  [ ] monitor MARIE drain output, minimal  - Dr Tejada is Oncologist plan to start chemotherapy and radiation post operatively    ID:  WBC- 12.00  --->> likely reactive post op --->>9.13 --->>8.12-->6.3  Tmax 101.3  Antibiotics-ceFAZolin   IVPB 2000 every 8 hours    ENDO:   HA1C pending ordered 10/1 AM    LINES/DRAINS:  PIV, Pritchard, MARIE drains x4     DISPO:   downgrade          Assessment & Plan  71y Female PMHx cervical CA in 1988, HTN, PSHx hysterectomy, appendectomy, found to have breast CA after palpating a lump in her R breast in april. MRI and biopsy 7/2021 revealed ductal carcinoma breast CA stage 2B with mets to R axillary LNs. Patient admitted on 9/29 and is now POD#2 s/p bilateral skin sparing mastectomy, left sln biopsy, right axillary dissection, bilateral insertion of implants (right alloderm). SICU consult for hypotension POD#1. Patient upgraded to SICU from .    NEURO:    Acute pain-controlled with Tylenol gabapentin 300 q8hr, holding prn valium & tramadol prn    RESP:     Currently on room air saturating 100%     Activity-ambulate as tolerated    CXR mild pulmonary congestion, stable     CARDS:   Acute post op hypotension, improved  -nicom responsive, 3L fluids overnight, total of 3.5L   -EKG unchanged  -trops negx3, CK downtrending   -Echo: LV ejection 66%, moderately enlarged right ventricle, no mitral regurge, mild-moderate tricuspid regurgitation, mild to moderate pulmonic valve regurgitation  Cardiologist: Dr Robert  - Home medications on hold: candesartan, metoprolol 25xl, ASA 81mg    GI/NUTR:     Diet, Regular   fluids discontinue,     GI Prophylaxis- PPI PO    Bowel regimen- senna, add miralax prn    /RENAL:   Monitor UO  pritchard removed TOV 1800  lactate 0.8 --> 2.2 --> 1.9    Labs:          BUN/Cr- 14/0.7  -->,  12/0.7  -->,  7/0.7  -->          Electrolytes-Na 143 // K 4.1 // Mg 1.9 //  Phos 3.0 (10-01 @ 00:03)  -repleted Mg & Phos    HEME/ONC:   DVT prophylaxis-heparin Injectable, SCDs    Labs: Hb/Hct:  10.9/33.7  -->,  10.8/34.2  -->                      Plts:  184  -->,  177  -->                 PTT/INR:  28.4/1.17  (09-30 @ 05:40) --->                 T&S: (09-29)    Breast CA, R breast with mets to R axillary LN (stage 2b)  - palpated breast lump in april, MRI and biopsy confirmed stage 2b breast CA  - now s/p bilateral mastectomy, with R axillary LN dissection, L sentinel node bx, bilateral breast implantation, MARIE drains x4  [ ] monitor MARIE drain output, minimal  - Dr Tejada is Oncologist plan to start chemotherapy and radiation post operatively    ID:  WBC- 12.00  --->> likely reactive post op --->>9.13 --->>8.12-->6.3  Tmax 101.3  Antibiotics-ceFAZolin   IVPB 2000 every 8 hours    ENDO:   HA1C pending ordered 10/1 AM    LINES/DRAINS:  PIV, Pritchard, MARIE drains x4     DISPO:   downgrade

## 2021-10-01 NOTE — PROGRESS NOTE ADULT - ASSESSMENT
ASSESSMENT:  71y F w/ PMHx of  right invasive ductal carcinoma s/p bilateral skin sparing mastectomy, left sln biopsy, right axillary dissection, bilateral insertion of implants -right alloderm    PLAN:  -Monitor MARIE drains, f/u output  -C/w bandeau dressing  -C/w antibiotics  -RD, c/w IVFs  -F/u pritchard output  -F/u PM labs  -Pain control  -WIll need VNS for drain care  -DVT/GI ppx    Lines/Tubes: PIV, [place]/MARIE drains, Pritchard Catheter.    SPECTRA [id number]

## 2021-10-01 NOTE — PHYSICAL THERAPY INITIAL EVALUATION ADULT - ADDITIONAL COMMENTS
Patient lives in home with 8 steps total to enter home. Daughter lives upstairs. Was independent in ADL's and ambulation without assistive device prior to hospitalization

## 2021-10-01 NOTE — PROGRESS NOTE ADULT - ATTENDING COMMENTS
71y Female PMHx cervical CA in 1988, HTN, PSHx hysterectomy, appendectomy, found to have breast CA after palpating a lump in her R breast in april. MRI and biopsy 7/2021 revealed ductal carcinoma breast CA stage 2B with mets to R axillary LNs. Patient admitted on 9/29 and is now POD#1 s/p bilateral skin sparing mastectomy, left sln biopsy, right axillary dissection, bilateral insertion of implants (right alloderm). SICU consult for hypotension POD#1. Patient upgraded to SICU from 4c.       Acute pain-controlled with Tylenol gabapentin 300 q8hr, holding prn valium & tramadol prn         Oxygen insufficiency-wean off NC to RA as tolerate currently on 2L NC    Activity-ambulate as tolerated    CXR mild pulmonary congestion     Acute post op hypotension, improved  -nicom responsive, 3L fluids overnight  -EKG unchanged  -trops negx3, CK downtrending   -Echo: LV ejection 66%, moderately enlarged right ventricle, no mitral regurge, mild-moderate tricuspid regurgitation, mild to moderate pulmonic valve regurgitation  Cardiologist: Dr Robert  - Home medications on hold: candesartan, metoprolol 25xl, ASA 81mg         Diet, Regular    continue with IVF LR @ 125cc/hr.. DC IVF    GI Prophylaxis- PPI PO    Bowel regimen- senna, add miralax prn       Monitor UO  placed pritchard for strict i&os, in setting of hypotension  --> dc    lactate 0.8 --> 2.2 --> 1.9    Labs:          BUN/Cr- 14/0.7  -->,  12/0.7  -->,  7/0.7  -->          Electrolytes-Na 143 // K 4.1 // Mg 1.9 //  Phos 3.0 (10-01 @ 00:03)  -repleted Mg & Phos    HEME/ONC:   DVT prophylaxis-heparin Injectable, SCDs    Labs: Hb/Hct:  10.9/33.7  -->,  10.8/34.2  -->            WBC- 12.00  --->> likely reactive post op --->>9.13 --->>8.12-->6.3  Tmax 101.3 x1 self resolved, UA neg   Antibiotics-ceFAZolin   IVPB 2000 every 8 hours        HA1C pending ordered 10/1 AM    LINES/DRAINS:  PIV, Pritchard, MARIE drains x4

## 2021-10-01 NOTE — PROGRESS NOTE ADULT - SUBJECTIVE AND OBJECTIVE BOX
JOSE FRANCISCO THOMAS  [id number]  71y Female    Indication for ICU admission:  POD#1 s/p bilateral skin sparing mastectomy, left sln biopsy, right axillary dissection, bilateral insertion of implants (right alloderm). SICU consult for hypotension POD#1. Patient upgraded to SICU from   Admit Date:09-29-21  ICU Date: 9/30  OR Date: 9/29     No Known Allergies    PAST MEDICAL & SURGICAL HISTORY:  HTN (hypertension)    Breast cancer  RIGHT BREAST CA- NEW DX    Cervical cancer  1988. S/P RADICAL HYSTERECTOMY    History of appendectomy    H/O abdominal hysterectomy      Home Medications:  aspirin 81 mg oral tablet: 1 tab(s) orally once a day (29 Sep 2021 06:33)  candesartan 8 mg oral tablet: 2 tab(s) orally once a day (29 Sep 2021 06:33)  CeleBREX:  (29 Sep 2021 06:33)  gabapentin:  (29 Sep 2021 06:33)  metoprolol succinate 25 mg oral capsule, extended release: 1 cap(s) orally once a day (29 Sep 2021 06:33)  Tylenol:  (29 Sep 2021 06:33)    24HRS EVENT:  NIGHT  -persistently nicom responsive   -3L in boluses  -BP normalized  -required 5mg oxy x1 for pain  -tmax 101.3  -repleted Mg & Phos    DAY  - hold gabapentin and valium until BP is ok  -do tylenol PRN  -NICOM  12%, 500cc bolus given, NICOM#2 11.7 500cc bolus given  - Echo: LV ejection 66%, moderately enlarged right ventricle, no mitral regurge, mild-moderate tricuspid regurgitation, mild to moderate pulmonic valve regurgitation  -CXR: mild pulmonary vascular congestion              DVT PTX: SQH    GI PTX:pantoprazole    Tablet 40 milliGRAM(s) Oral before breakfast      ***Tubes/Lines/Drains  ***  Peripheral IV  Urinary Catheter		Indication: Strict I&O    Date Placed: 9/30    REVIEW OF SYSTEMS    [x ] A ten-point review of systems was otherwise negative except as noted.  [ ] Due to altered mental status/intubation, subjective information were not able to be obtained from the patient. History was obtained, to the extent possible, from review of the chart and collateral sources of information.     JOSE FRANCISCO THOMAS  [id number]  71y Female    Indication for ICU admission:  POD#1 s/p bilateral skin sparing mastectomy, left sln biopsy, right axillary dissection, bilateral insertion of implants (right alloderm). SICU consult for hypotension POD#1. Patient upgraded to SICU from   Admit Date:09-29-21  ICU Date: 9/30  OR Date: 9/29     No Known Allergies    PAST MEDICAL & SURGICAL HISTORY:  HTN (hypertension)    Breast cancer  RIGHT BREAST CA- NEW DX    Cervical cancer  1988. S/P RADICAL HYSTERECTOMY    History of appendectomy    H/O abdominal hysterectomy      Home Medications:  aspirin 81 mg oral tablet: 1 tab(s) orally once a day (29 Sep 2021 06:33)  candesartan 8 mg oral tablet: 2 tab(s) orally once a day (29 Sep 2021 06:33)  CeleBREX:  (29 Sep 2021 06:33)  gabapentin:  (29 Sep 2021 06:33)  metoprolol succinate 25 mg oral capsule, extended release: 1 cap(s) orally once a day (29 Sep 2021 06:33)  Tylenol:  (29 Sep 2021 06:33)    24HRS EVENT:  NIGHT  -persistently nicom responsive   -3L in boluses  -BP normalized  -required 5mg oxy x1 for pain  -tmax 101.3  -repleted Mg & Phos    DAY  - hold gabapentin and valium until BP is ok  -do tylenol PRN  -NICOM  12%, 500cc bolus given, NICOM#2 11.7 500cc bolus given  - Echo: LV ejection 66%, moderately enlarged right ventricle, no mitral regurge, mild-moderate tricuspid regurgitation, mild to moderate pulmonic valve regurgitation  -CXR: mild pulmonary vascular congestion              DVT PTX: SQH    GI PTX:pantoprazole    Tablet 40 milliGRAM(s) Oral before breakfast      ***Tubes/Lines/Drains  ***  Peripheral IV  Urinary Catheter		Indication: Strict I&O    Date Placed: 9/30    REVIEW OF SYSTEMS    [x ] A ten-point review of systems was otherwise negative except as noted.  [ ] Due to altered mental status/intubation, subjective information were not able to be obtained from the patient. History was obtained, to the extent possible, from review of the chart and collateral sources of information.    T(C): 37.3 (10-01-21 @ 08:17), Max: 38.5 (10-01-21 @ 01:00)  HR: 71 (10-01-21 @ 11:00) (65 - 82)  BP: 132/62 (10-01-21 @ 11:00) (91/44 - 136/62)  RR: 23 (10-01-21 @ 11:00) (14 - 26)  SpO2: 98% (10-01-21 @ 11:00) (96% - 100%)  BMI (kg/m2): 25.2 (09-29-21 @ 21:06)    Gen: A+OX3. NAD  Heart: RRR, no M/R/G  Breasts: binder in place 4 MARIE drains with minimal out put  Lungs: CTAB  Abd: Soft, Nontender. nondistended, + BS no rebound or rigidity  LE: no erythema, edema or pain, SCDs in place

## 2021-10-02 ENCOUNTER — TRANSCRIPTION ENCOUNTER (OUTPATIENT)
Age: 71
End: 2021-10-02

## 2021-10-02 VITALS — SYSTOLIC BLOOD PRESSURE: 152 MMHG | HEART RATE: 80 BPM | DIASTOLIC BLOOD PRESSURE: 60 MMHG

## 2021-10-02 LAB
ANION GAP SERPL CALC-SCNC: 13 MMOL/L — SIGNIFICANT CHANGE UP (ref 7–14)
BUN SERPL-MCNC: 7 MG/DL — LOW (ref 10–20)
CALCIUM SERPL-MCNC: 8.6 MG/DL — SIGNIFICANT CHANGE UP (ref 8.5–10.1)
CHLORIDE SERPL-SCNC: 104 MMOL/L — SIGNIFICANT CHANGE UP (ref 98–110)
CO2 SERPL-SCNC: 23 MMOL/L — SIGNIFICANT CHANGE UP (ref 17–32)
CREAT SERPL-MCNC: 0.6 MG/DL — LOW (ref 0.7–1.5)
GLUCOSE SERPL-MCNC: 103 MG/DL — HIGH (ref 70–99)
HCT VFR BLD CALC: 33 % — LOW (ref 37–47)
HGB BLD-MCNC: 10.9 G/DL — LOW (ref 12–16)
MAGNESIUM SERPL-MCNC: 1.8 MG/DL — SIGNIFICANT CHANGE UP (ref 1.8–2.4)
MCHC RBC-ENTMCNC: 29.4 PG — SIGNIFICANT CHANGE UP (ref 27–31)
MCHC RBC-ENTMCNC: 33 G/DL — SIGNIFICANT CHANGE UP (ref 32–37)
MCV RBC AUTO: 88.9 FL — SIGNIFICANT CHANGE UP (ref 81–99)
NRBC # BLD: 0 /100 WBCS — SIGNIFICANT CHANGE UP (ref 0–0)
PHOSPHATE SERPL-MCNC: 3 MG/DL — SIGNIFICANT CHANGE UP (ref 2.1–4.9)
PLATELET # BLD AUTO: 179 K/UL — SIGNIFICANT CHANGE UP (ref 130–400)
POTASSIUM SERPL-MCNC: 3.6 MMOL/L — SIGNIFICANT CHANGE UP (ref 3.5–5)
POTASSIUM SERPL-SCNC: 3.6 MMOL/L — SIGNIFICANT CHANGE UP (ref 3.5–5)
RBC # BLD: 3.71 M/UL — LOW (ref 4.2–5.4)
RBC # FLD: 12.7 % — SIGNIFICANT CHANGE UP (ref 11.5–14.5)
SODIUM SERPL-SCNC: 140 MMOL/L — SIGNIFICANT CHANGE UP (ref 135–146)
WBC # BLD: 6.6 K/UL — SIGNIFICANT CHANGE UP (ref 4.8–10.8)
WBC # FLD AUTO: 6.6 K/UL — SIGNIFICANT CHANGE UP (ref 4.8–10.8)

## 2021-10-02 PROCEDURE — 71045 X-RAY EXAM CHEST 1 VIEW: CPT | Mod: 26

## 2021-10-02 RX ORDER — GABAPENTIN 400 MG/1
0 CAPSULE ORAL
Qty: 0 | Refills: 0 | DISCHARGE

## 2021-10-02 RX ORDER — CEPHALEXIN 500 MG
1 CAPSULE ORAL
Qty: 14 | Refills: 0
Start: 2021-10-02 | End: 2021-10-08

## 2021-10-02 RX ORDER — TRAMADOL HYDROCHLORIDE 50 MG/1
1 TABLET ORAL
Qty: 15 | Refills: 0
Start: 2021-10-02 | End: 2021-10-06

## 2021-10-02 RX ORDER — DIAZEPAM 5 MG
1 TABLET ORAL
Qty: 9 | Refills: 0
Start: 2021-10-02 | End: 2021-10-04

## 2021-10-02 RX ORDER — DIAZEPAM 5 MG
1 TABLET ORAL
Qty: 9 | Refills: 0
Start: 2021-10-02 | End: 2021-10-06

## 2021-10-02 RX ADMIN — Medication 650 MILLIGRAM(S): at 14:30

## 2021-10-02 RX ADMIN — Medication 650 MILLIGRAM(S): at 13:24

## 2021-10-02 RX ADMIN — PANTOPRAZOLE SODIUM 40 MILLIGRAM(S): 20 TABLET, DELAYED RELEASE ORAL at 05:49

## 2021-10-02 RX ADMIN — TRAMADOL HYDROCHLORIDE 50 MILLIGRAM(S): 50 TABLET ORAL at 13:05

## 2021-10-02 RX ADMIN — Medication 650 MILLIGRAM(S): at 05:49

## 2021-10-02 RX ADMIN — HEPARIN SODIUM 5000 UNIT(S): 5000 INJECTION INTRAVENOUS; SUBCUTANEOUS at 05:49

## 2021-10-02 RX ADMIN — Medication 650 MILLIGRAM(S): at 06:24

## 2021-10-02 RX ADMIN — TRAMADOL HYDROCHLORIDE 50 MILLIGRAM(S): 50 TABLET ORAL at 12:02

## 2021-10-02 RX ADMIN — Medication 650 MILLIGRAM(S): at 00:15

## 2021-10-02 RX ADMIN — Medication 100 MILLIGRAM(S): at 05:49

## 2021-10-02 NOTE — DISCHARGE NOTE PROVIDER - NSDCFUSCHEDAPPT_GEN_ALL_CORE_FT
JOSE FRANCISCO THOMAS ; 10/06/2021 ; NPP Surg Pl[facility] [address] [facility]  JOSE FRANCISCO THOMAS ; 10/12/2021 ; NPP Med Breast [address]

## 2021-10-02 NOTE — PROVIDER CONTACT NOTE (CRITICAL VALUE NOTIFICATION) - SITUATION
MD Nguyen [phone] notified of patients vital signs including manual /60 P:85. BP taken on left calf. Pt is B/L arm precautions s/p B/L mastectomy.

## 2021-10-02 NOTE — DISCHARGE NOTE PROVIDER - PROVIDER TOKENS
PROVIDER:[TOKEN:[[id number]],FOLLOWUP:[Routine],ESTABLISHEDPATIENT:[T]],PROVIDER:[TOKEN:[[id number]],FOLLOWUP:[Routine],ESTABLISHEDPATIENT:[T]]

## 2021-10-02 NOTE — DISCHARGE NOTE NURSING/CASE MANAGEMENT/SOCIAL WORK - PATIENT PORTAL LINK FT
You can access the FollowMyHealth Patient Portal offered by [facility] by registering at the following website: [email or web address]. By joining [employer]’s FollowMyHealth portal, you will also be able to view your health information using other applications (apps) compatible with our system.

## 2021-10-02 NOTE — DISCHARGE NOTE PROVIDER - CARE PROVIDER_API CALL
Maya Ferguson)  Surgery  [address] [facility], [address]nd [place] [address]  Phone: [phone]  Fax: [phone]  Established Patient  Follow Up Time: Routine    Priscilla Barrios)  Surgical Physicians  [address] [facility], [address]  [place] [address]  Phone: [phone]  Fax: [phone]  Established Patient  Follow Up Time: Routine

## 2021-10-02 NOTE — PROGRESS NOTE ADULT - SUBJECTIVE AND OBJECTIVE BOX
GENERAL SURGERY PROGRESS NOTE    Patient: JOSE FRANCISCO THOMAS , 71y (54)Female   MRN: [id number]  Location: [address]  Visit: 21 Inpatient  Date: 10-02-21 @ 01:11    Hospital Day #: 4  Post-Op Day #: 3    Procedure/Dx/Injuries: s/p bilateral skin sparing mastectomy, left ln biopsy, right axillary dissection, bilateral insertion of implants -right alloderm     · Operative Findings	right breast, right axillary contents; left breast, left sentinel node #1: 107 (hot and blue); left sentinel node #2: 324 (hot and blue)    · Operative Findings	Right: Total submuscular placement of silicone implant with alloderm matrix   Left: Prepectoral placement of silicone implant    Events of past 24 hours: Patient downgraded from SICU to surgical floor. Patient with fever to 101.3F 10/1 at approximately 1AM, however has been afebrile since. Patient reports feeling better.    PAST MEDICAL & SURGICAL HISTORY:  HTN (hypertension)  Breast cancer RIGHT BREAST CA- NEW DX  Cervical cancer . S/P RADICAL HYSTERECTOMY  History of appendectomy  H/O abdominal hysterectomy    Vitals:   T(F): 98.5 (10-01-21 @ 21:09), Max: 99.8 (10-01-21 @ 04:00)  HR: 69 (10-01-21 @ 21:09)  BP: 136/65 (10-01-21 @ 21:09)  RR: 20 (10-01-21 @ 21:09)  SpO2: 95% (10-01-21 @ 21:09)    Diet, Regular    I & O's:  21 @ 07:01  -  10-01-21 @ 07:00  --------------------------------------------------------  IN:    IV PiggyBack: 150 mL    Lactated Ringers: 3000 mL    Lactated Ringers Bolus: 3750 mL    Oral Fluid: 400 mL    Sodium Chloride 0.9% Bolus: 500 mL  Total IN: 7800 mL    OUT:    Drain (mL): 13 mL    Drain (mL): 110 mL    Drain (mL): 35 mL    Drain (mL): 35 mL    Indwelling Catheter - Urethral (mL): 6200 mL  Total OUT: 6393 mL    Total NET: 1407 mL    PHYSICAL EXAM:  General: NAD, AAOx3, calm and cooperative  Cardiac: RRR S1, S2  Respiratory: CTAB, normal respiratory effort  Breast: Surgical bra in place, dressing c/d/i. Capillary refill < 3 seconds bilaterally. Skin warm, dry, normal color. Bilateral elan drains x 2 with serosanguinous output.  Abdomen: Soft, non-distended, non-tender, no rebound, no guarding.  Skin: Warm/dry, normal color, no jaundice    MEDICATIONS  (STANDING):  acetaminophen   Tablet .. 650 milliGRAM(s) Oral every 6 hours  ceFAZolin   IVPB 2000 milliGRAM(s) IV Intermittent every 8 hours  chlorhexidine 4% Liquid 1 Application(s) Topical <User Schedule>  heparin   Injectable 5000 Unit(s) SubCutaneous every 8 hours  pantoprazole    Tablet 40 milliGRAM(s) Oral before breakfast  senna 2 Tablet(s) Oral at bedtime    MEDICATIONS  (PRN):  traMADol 50 milliGRAM(s) Oral every 8 hours PRN Severe Pain (7 - 10)    DVT PROPHYLAXIS: heparin   Injectable 5000 Unit(s) SubCutaneous every 8 hours  GI PROPHYLAXIS: pantoprazole    Tablet 40 milliGRAM(s) Oral before breakfast    ANTICOAGULATION:   ANTIBIOTICS:  ceFAZolin   IVPB 2000 milliGRAM(s)    LAB/STUDIES:             10.8   6.33  )-----------( 184      ( 01 Oct 2021 00:03 )             34.2       10-  143  |  108  |  7<L>  ----------------------------<  94  4.1   |  23  |  0.7    ABG - ( 30 Sep 2021 06:06 )  pH: 7.42  /  pCO2: 39    /  pO2: 126   / HCO3: 25    / Base Excess: 0.8   /  SaO2: 100.0     Coags:  13.40  ----< 1.17    ( 30 Sep 2021 05:40 )     28.4      CARDIAC MARKERS ( 30 Sep 2021 16:45 )  x     / <0.01 ng/mL / 368 U/L / x     / 3.1 ng/mL  CARDIAC MARKERS ( 30 Sep 2021 11:20 )  x     / <0.01 ng/mL / 409 U/L / x     / 5.4 ng/mL  CARDIAC MARKERS ( 30 Sep 2021 05:40 )  x     / <0.01 ng/mL / 426 U/L / x     / 6.3 ng/mL    Urinalysis Basic - ( 01 Oct 2021 07:35 )  Color: Colorless / Appearance: Clear / S.008 / pH: x  Gluc: x / Ketone: Trace  / Bili: Negative / Urobili: <2 mg/dL   Blood: x / Protein: Negative / Nitrite: Negative   Leuk Esterase: Large / RBC: 2 /HPF / WBC 14 /HPF   Sq Epi: x / Non Sq Epi: 1 /HPF / Bacteria: Negative    IMAGING:  < from: Xray Chest 1 View- PORTABLE-Routine (10.01.21 @ 06:38) >  Impression:    Stable bilateral lung opacities  < end of copied text >

## 2021-10-02 NOTE — PROVIDER CONTACT NOTE (CRITICAL VALUE NOTIFICATION) - ASSESSMENT
Pt assessed. No c/o chest pain/dizziness/shortness of breath. Pt resting in bed with daughter at bedside. Pt assessed. Patient has complaints of headache. Will administer Tylenol at this time.   No c/o chest pain/shortness of breath. Pt resting in bed with daughter at bedside.

## 2021-10-02 NOTE — DISCHARGE NOTE NURSING/CASE MANAGEMENT/SOCIAL WORK - NSTRANSFERBELONGINGSRESP_GEN_A_NUR
Name: Haja Marti  MRN: [id number]   CSN: [id number]      Date: 03/02/2020    Chief Complaint / Interval History:  - MORE TROUBLE WITH GAIT NOW, cannot complete Boxing classes as frequently  - more aching legs, but no other sxs of neurogenic claudication  - voice is lower and more raspy  - more episodes of confusion, has had visual hallucinations of arms in the hallway, sees fish in the toilet  - more issues with balancing checkbook, more decision making issues  - wife is a helping a lot  - slower decision making    From Aug 2019:   - back feels better, some stiffness, but no back pain  - surgery was a success!  - no issues with the meds, even cut back the klonopin  - not too anxious  - sleeping well  - might go to     From May 2019:  - had surgery on May 1, at [facility], multiple level L2-5  - had post-op confusion and sundowning  - now feeling better, a little confused at home  - taking very rare pain meds  - planning PT, right now has home health for 3 weeks, then see what he needs  - PD symptoms seem to be about    From Feb 22, 2019:  - recent back pain  - medrol dose mariel helped take from 10 --> 5  - then got B L5 injections from [facility] 9 now 2/10 at worst)  - worse in hte AM, better with time and stooping over- gabapentin not effective  - PD symptoms are doing   - Ibuprofen x 3- 4 tabs usually helps        - saw vipin in NOv    From Nov  - more fatigue  - exercising some  - feels pretty stable  -  Good support with family  - little off time  -  No dyskinesia  - exercising  - no bowel/bladder changes  - mild AM LH and non-motor complications of near-syncope    From Aug 2018:  - having much more fatigue, lays down to feel better  - balance and gait about the same  -  Still constipated   - watching his BP, has been more normal for other doctors    From April 2018:  - balance and gait is better   - taking miralax as needed, usually takes 2-3 days to move bowels  - not drinking much water    From January 2018:  -  "azilect had some insomnia  - restarted the cd/ld 1/2 tid - wife thinks all is a little better but not dramatic  - voice particularly better  - no recurrence of hallucinations on the low dose cd/ld  -  He thinks the new cd/ld has leveled out, but anxiety is worse, for nearly every event in the family  - he is ruminating 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  Handwriting has gotten "terrible."  Slower with dressing overall, and with most things.    Is a cautious , but has a tendency to pull to the R side of the road (correcting to the left).  More issues with parking, no issues moving backwards but uses side mirrors.  No accidents.      For about 6 months, there has been a progressive hoarseness of the voice.  Comes and goes, not clear timing.      No tremor.      Saw Dr. Bedoya in July, dx with suspected parkinson's, and was started on CD/LD 25/100 up to 1 TID (but has never taken more than 1 tab twice a day)    Nonmotor/Premotor ROS:  Hyposmia (HENT)?Yes (and lost taste) for 20 years.  RBD/sleep issues (Constitutional)?Yes - fights in his sleep, jumps out of bed, falls out over 5 years, talking for 30 years.  Depression/anxiety (Psychiatric)?Yes  Fatigue (Constitutional)?Yes  Constipation (GI)?Yes  Urinary issues ()?Yes  Sexual dysfunction ()?Yes  -ED  Orthostasis (Cardiovascular)?Yes  Leg swelling (Cardiovascular)? No  Falls (Musculoskeletal)?Yes - has had a couple of falls, more unsteady on a boat.    Cognitive impairment (Neurologic)?Yes - a little slower overall  Psychoses (Psychiatric)?Yes - recently had experience thinking he saw people in his den, non-descript faces; 2 years ago, had a experience of seeing someone in the bedroom.  He's had no clear delusions. "   Pain/Paresthesia (Neurologic)?Yes - lumbar for years, some disability, helped with pain management.  Visual changes (Eyes)?No  Moles / skin changes (Skin)?No  Stridor / SOB (Pulm)?No  Bruising (Heme)?No    Past Medical History: The patient  has a past medical history of Anxiety, BPH (benign prostatic hypertrophy), Cataract, Colon polyp (2002), Depression, Epiretinal membrane, both eyes, psychiatric care, Hyperlipidemia, Hypertension, Kidney stones, Kidney stones, Parkinsons, Posterior vitreous detachment of left eye, Psychiatric problem, Retinal detachment (2004), Retinoschisis, left eye, Sleep apnea, and Vitreous hemorrhage of left eye.    Social History: The patient  reports that he has never smoked. He has never used smokeless tobacco. He reports that he does not drink alcohol or use drugs.    Family History: Their family history includes Anxiety disorder in his daughter and son; Cancer in his father and mother; Cataracts in his father; Depression in his son; Diabetes in his father.    Allergies: Patient has no known allergies.     Meds:   Current Outpatient Medications on File Prior to Visit   Medication Sig Dispense Refill    atorvastatin (LIPITOR) 40 MG tablet Take 1 tablet (40 mg total) by mouth once daily. 90 tablet 3    carbidopa-levodopa  mg (SINEMET)  mg per tablet Take 1.5 tablets by mouth 3 (three) times daily. 450 tablet 3    citric acid-potassium citrate (POLYCITRA) 1,100-334 mg/5 mL solution Take 10 mLs by mouth.      clonazePAM (KLONOPIN) 0.5 MG tablet Take oral 1/2 to 1 tablet TID prn anxiety 180 tablet 1    ergocalciferol (VITAMIN D2) 50,000 unit Cap Take 50,000 Units by mouth every 30 days.       finasteride (PROSCAR) 5 mg tablet Take 1 tablet (5 mg total) by mouth once daily. 90 tablet 3    losartan (COZAAR) 100 MG tablet Take 1 tablet (100 mg total) by mouth once daily. 90 tablet 3    magnesium oxide (MAG-OX) 400 mg tablet Take 400 mg by mouth once daily.      potassium  "citrate (UROCIT-K) 10 mEq (1,080 mg) TbSR TAKE 1 TABLET THREE TIMES DAILY WITH MEALS (Patient taking differently: Take 10 mEq by mouth 2 (two) times daily with meals. ) 270 tablet 3    pyridoxine (VITAMIN B-6) 50 MG Tab Take 50 mg by mouth once daily.      sertraline (ZOLOFT) 100 MG tablet Take 2 tablets (200 mg total) by mouth once daily. 180 tablet 1    FLUAD 9491-0005, 65 YR UP,,PF, 45 mcg (15 mcg x 3)/0.5 mL Syrg ADM 0.5ML IM UTD  0    gabapentin (NEURONTIN) 300 MG capsule Increase to 2 tablets at night, 1 in AM and 1 in afternoon for 3 days.  Then increase to 2 tablets at night and 2 in morning, 1 tablet in afternoon for 3 days.  Then increase to 2 tablets three times a day (1800mg) 180 capsule 11    HYDROcodone-acetaminophen (NORCO) 5-325 mg per tablet Take 1 tablet by mouth every 6 (six) hours as needed.      L.acid-B.bifidum-B.animal-FOS 25 billion cell -100 mg Cap Take by mouth.       No current facility-administered medications on file prior to visit.      Exam:  /80   Pulse 72   Ht 5' 9" (1.753 m)   Wt 88.5 kg (195 lb)   BMI 28.80 kg/m²     * Specialized movement exam  Severe hypophonic speech.    ++ mouth sl open, facial masking.   L>R mild-mod cogwheel rigidity.     L>R mild-mod bradykinesia.   No tremor with rest, posture, kinesis, or intention.    No other dystonia, chorea, athetosis, myoclonus, or tics.   No motor impersistence.   Normal-based gait.   Mildly shortened stride length.  + abnormal arm swing.     No postural instability.      Laboratory/Radiological:  - Results:  Admission on 12/03/2019, Discharged on 12/03/2019   Component Date Value Ref Range Status    Final Pathologic Diagnosis 12/03/2019    Final                    Value:COLON, ASCENDING, POLYP, HOT SNARE POLYPECTOMY:  - Tubular adenoma.      Gross 12/03/2019    Final                    Value:Patient MRN as documented on container and clinic/AP label:  [id number]    1.  The specimen is received in formalin, labeled " ascending colon polyp, and  consists of  multiple fragments of red-brown friable tissue ranging in size  from 0.2-0.4 cm. The specimen is entirely submitted in cassette  [id number].    Dictated by: Madelaine Lopez       - Independent review of images:    Diagnoses:          1) Parkinsonism, likely iPD (vs. DLB given the early neuropsychiatric features, but those have resolved)  2) Neurogenic claudication 2/2 L5 --> had multiple levels done on May 1, 2019 (L2-5).    - donepezil for gait and memory  - pt/ot  - medi diet            Edward Ivey MD, MPH  Division of Movement and Memory Disorders  [facility]  [phone]   yes

## 2021-10-02 NOTE — DISCHARGE NOTE PROVIDER - CARE PROVIDERS DIRECT ADDRESSES
[email or web address]

## 2021-10-02 NOTE — PROVIDER CONTACT NOTE (CRITICAL VALUE NOTIFICATION) - ACTION/TREATMENT ORDERED:
Per MD Gonzalez [phone], patient instructed to take her home BP medications at this time (with daughter at bedside). (Metoprolol 25mg and Candesartan 16mg) Patient took her own medications. Will document on MAR when order becomes available. Will re-assess vital signs in 30-40 minutes and notify MD/Green team.

## 2021-10-02 NOTE — PROGRESS NOTE ADULT - ATTENDING COMMENTS
D/w Dr. Carlos Qiu team intern  Improved BP  Ambulating  Pain controlled with Tylenol/Tramadol  AVSS  Mastectomy flaps well perfused, implants in good position, no hematoma, MARIE drains in place D/w Dr. Carlos Qiu team intern  Improved BP  Ambulating  Pain controlled with Tylenol/Tramadol  Passed TOV  AVSS  Mastectomy flaps well perfused, implants in good position, no hematoma, MARIE drains in place    POD#3 s/p BL breast DTI breast reconstruction.  HD stable.  Mastectomy flaps perfused.  No hematoma    -Cleared from PRS perspective, dispo per Breast  -Surgical bra at all times  -homegoing rx: kelfex, tylenol, tramadol, valium 0.5 mg q 8 hrs PRN muscle spasm  -no heavy lifting  -frequent ambulation  -MARIE drain care: VNS  -follow up in office as scheduled

## 2021-10-02 NOTE — PROGRESS NOTE ADULT - ASSESSMENT
ASSESSMENT:  71y F w/ PMHx of  right invasive ductal carcinoma s/p bilateral skin sparing mastectomy, left sln biopsy, right axillary dissection, bilateral insertion of implants -right alloderm    PLAN:  -Monitor MARIE drains, f/u output  -C/w bandeau dressing  -C/w antibiotics  -Pain control  -Will need VNS for drain care, possible discharge today if arranged  -DVT/GI ppx    SPECTRA [id number]

## 2021-10-02 NOTE — DISCHARGE NOTE PROVIDER - HOSPITAL COURSE
9/29:  Semi elective bilateral mastectomy and breast reconstruction with Dr Ferguson and Dr Barrios. Case was uncomplicated and patient was sent to the floor about 20:00 last night. Per surgical team management at approximatively 22:00 nitropaste was applied to right superior aspect of breast approx [id number] in surface. Shortly after patient found to be hypotensive with pressures as low as 70/40. Nitropaste was removed at 00:00 and a total 2.5L of fluid was administered on the floor by primary team. SICU consult for persistent hypotension.     On SICU exam, patient states she is not feeling well, admits to feeling lighted and weak. Her BP during exam was 88/49 with MAP 62. Patient A&Ox4, HUANG, with palpable DP pulses bilaterally. STAT EKG, CXR and labs sent, labs revealed Hg 11.4 (pre op 14), Cr normal 0.7 (baseline), WBC 12, hypomagnesemia which was repleted.     9/30-10/2: Patient responded to fluids and BP stabilized, subsequently downgraded form ICU.  Patient has been afebrile, tolerating diet, OOB and ambulating, with good capillary refill, pain well controlled, hemodynamically stable, minimal drain output.  Cleared for DC to home today with home VNS services for drain care.

## 2021-10-02 NOTE — DISCHARGE NOTE PROVIDER - NSDCCPCAREPLAN_GEN_ALL_CORE_FT
PRINCIPAL DISCHARGE DIAGNOSIS  Diagnosis: S/P bilateral mastectomy  Assessment and Plan of Treatment: Follow up with Dr. Ferguson and Dr. Barrios as scheduled.  Drain care with VNS services.  No strenough activity with arms or heavy lifting.  Sponge bathes only until seen in clinic.  Tylenol OTC for pain with Tramadol for severe breakthrough pain.  Valium as needed for muscle spasms.

## 2021-10-02 NOTE — PROGRESS NOTE ADULT - SUBJECTIVE AND OBJECTIVE BOX
GENERAL SURGERY PROGRESS NOTE    Patient: JOSE FRANCISCO THOMAS , 71y (54)Female   MRN: [id number]  Location: [address]  Visit: 21 Inpatient  Date: 10-02-21 @ 01:11    Hospital Day #: 4  Post-Op Day #: 3    Procedure/Dx/Injuries: s/p bilateral skin sparing mastectomy, left ln biopsy, right axillary dissection, bilateral insertion of implants -right alloderm     · Operative Findings	right breast, right axillary contents; left breast, left sentinel node #1: 107 (hot and blue); left sentinel node #2: 324 (hot and blue)    · Operative Findings	Right: Total submuscular placement of silicone implant with alloderm matrix   Left: Prepectoral placement of silicone implant    Events of past 24 hours: Patient downgraded from SICU to surgical floor. Patient with fever to 101.3F 10/1 at approximately 1AM, however has been afebrile since. Patient reports feeling better.    PAST MEDICAL & SURGICAL HISTORY:  HTN (hypertension)  Breast cancer RIGHT BREAST CA- NEW DX  Cervical cancer . S/P RADICAL HYSTERECTOMY  History of appendectomy  H/O abdominal hysterectomy    Vitals:   T(F): 98.5 (10-01-21 @ 21:09), Max: 99.8 (10-01-21 @ 04:00)  HR: 69 (10-01-21 @ 21:09)  BP: 136/65 (10-01-21 @ 21:09)  RR: 20 (10-01-21 @ 21:09)  SpO2: 95% (10-01-21 @ 21:09)    Diet, Regular    I & O's:  21 @ 07:01  -  10-01-21 @ 07:00  --------------------------------------------------------  IN:    IV PiggyBack: 150 mL    Lactated Ringers: 3000 mL    Lactated Ringers Bolus: 3750 mL    Oral Fluid: 400 mL    Sodium Chloride 0.9% Bolus: 500 mL  Total IN: 7800 mL    OUT:    Drain (mL): 13 mL    Drain (mL): 110 mL    Drain (mL): 35 mL    Drain (mL): 35 mL    Indwelling Catheter - Urethral (mL): 6200 mL  Total OUT: 6393 mL    Total NET: 1407 mL    PHYSICAL EXAM:  General: NAD, AAOx3, calm and cooperative  Cardiac: RRR S1, S2  Respiratory: CTAB, normal respiratory effort  Breast: Surgical bra in place, dressing c/d/i. Capillary refill < 3 seconds bilaterally. Skin warm, dry, normal color. Bilateral elan drains x 2 with serosanguinous output.  Abdomen: Soft, non-distended, non-tender, no rebound, no guarding.  Skin: Warm/dry, normal color, no jaundice    MEDICATIONS  (STANDING):  acetaminophen   Tablet .. 650 milliGRAM(s) Oral every 6 hours  ceFAZolin   IVPB 2000 milliGRAM(s) IV Intermittent every 8 hours  chlorhexidine 4% Liquid 1 Application(s) Topical <User Schedule>  heparin   Injectable 5000 Unit(s) SubCutaneous every 8 hours  pantoprazole    Tablet 40 milliGRAM(s) Oral before breakfast  senna 2 Tablet(s) Oral at bedtime    MEDICATIONS  (PRN):  traMADol 50 milliGRAM(s) Oral every 8 hours PRN Severe Pain (7 - 10)    DVT PROPHYLAXIS: heparin   Injectable 5000 Unit(s) SubCutaneous every 8 hours  GI PROPHYLAXIS: pantoprazole    Tablet 40 milliGRAM(s) Oral before breakfast    ANTICOAGULATION:   ANTIBIOTICS:  ceFAZolin   IVPB 2000 milliGRAM(s)    LAB/STUDIES:             10.8   6.33  )-----------( 184      ( 01 Oct 2021 00:03 )             34.2       10-  143  |  108  |  7<L>  ----------------------------<  94  4.1   |  23  |  0.7    ABG - ( 30 Sep 2021 06:06 )  pH: 7.42  /  pCO2: 39    /  pO2: 126   / HCO3: 25    / Base Excess: 0.8   /  SaO2: 100.0     Coags:  13.40  ----< 1.17    ( 30 Sep 2021 05:40 )     28.4      CARDIAC MARKERS ( 30 Sep 2021 16:45 )  x     / <0.01 ng/mL / 368 U/L / x     / 3.1 ng/mL  CARDIAC MARKERS ( 30 Sep 2021 11:20 )  x     / <0.01 ng/mL / 409 U/L / x     / 5.4 ng/mL  CARDIAC MARKERS ( 30 Sep 2021 05:40 )  x     / <0.01 ng/mL / 426 U/L / x     / 6.3 ng/mL    Urinalysis Basic - ( 01 Oct 2021 07:35 )  Color: Colorless / Appearance: Clear / S.008 / pH: x  Gluc: x / Ketone: Trace  / Bili: Negative / Urobili: <2 mg/dL   Blood: x / Protein: Negative / Nitrite: Negative   Leuk Esterase: Large / RBC: 2 /HPF / WBC 14 /HPF   Sq Epi: x / Non Sq Epi: 1 /HPF / Bacteria: Negative    IMAGING:  < from: Xray Chest 1 View- PORTABLE-Routine (10.01.21 @ 06:38) >  Impression:    Stable bilateral lung opacities  < end of copied text >

## 2021-10-02 NOTE — PROGRESS NOTE ADULT - ASSESSMENT
ASSESSMENT:  71y F w/ PMHx of  right invasive ductal carcinoma s/p bilateral skin sparing mastectomy, left sln biopsy, right axillary dissection, bilateral insertion of implants -right alloderm    PLAN:  -Monitor MARIE drains, f/u output  -C/w bandeau dressing  -C/w antibiotics  -Pain control  -Will need VNS for drain care, possible discharge today if arranged  -DVT/GI ppx    SPECTRA [id number]

## 2021-10-02 NOTE — DISCHARGE NOTE PROVIDER - NSDCMRMEDTOKEN_GEN_ALL_CORE_FT
aspirin 81 mg oral tablet: 1 tab(s) orally once a day  candesartan 8 mg oral tablet: 2 tab(s) orally once a day  CeleBREX:   cephalexin 500 mg oral capsule: 1 cap(s) orally 2 times a day MDD:2 caps  metoprolol succinate 25 mg oral capsule, extended release: 1 cap(s) orally once a day  traMADol 50 mg oral tablet: 1 tab(s) orally every 8 hours, As needed, Severe Pain (7 - 10) MDD:3 tabs  Tylenol:   Valium 2 mg oral tablet: 1 tab(s) orally every 8 hours, As Needed -for muscle spasm MDD:3 tabs

## 2021-10-02 NOTE — DISCHARGE NOTE PROVIDER - NSDCFUADDINST_GEN_ALL_CORE_FT
Follow up with Dr. Ferguson and Dr. Barrios as scheduled.  Drain care with VNS services.  No strenuous activity with arms or heavy lifting.  Sponge bathes only until seen in clinic.  Tylenol OTC for pain with Tramadol for severe breakthrough pain.  Valium as needed for muscle spasms.  Antibiotics as prescribed for 1 week.

## 2021-10-05 ENCOUNTER — EMERGENCY (EMERGENCY)
Facility: HOSPITAL | Age: 71
LOS: 0 days | Discharge: HOME | End: 2021-10-05
Attending: EMERGENCY MEDICINE | Admitting: EMERGENCY MEDICINE
Payer: MEDICARE

## 2021-10-05 VITALS
DIASTOLIC BLOOD PRESSURE: 71 MMHG | WEIGHT: 126.99 LBS | OXYGEN SATURATION: 99 % | HEIGHT: 60 IN | SYSTOLIC BLOOD PRESSURE: 137 MMHG | TEMPERATURE: 98 F | RESPIRATION RATE: 18 BRPM | HEART RATE: 77 BPM

## 2021-10-05 DIAGNOSIS — Z90.49 ACQUIRED ABSENCE OF OTHER SPECIFIED PARTS OF DIGESTIVE TRACT: Chronic | ICD-10-CM

## 2021-10-05 DIAGNOSIS — M79.89 OTHER SPECIFIED SOFT TISSUE DISORDERS: ICD-10-CM
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
## 2021-10-05 DIAGNOSIS — T88.9XXA COMPLICATION OF SURGICAL AND MEDICAL CARE, UNSPECIFIED, INITIAL ENCOUNTER: ICD-10-CM

## 2021-10-05 DIAGNOSIS — I10 ESSENTIAL (PRIMARY) HYPERTENSION: ICD-10-CM

## 2021-10-05 DIAGNOSIS — Z85.3 PERSONAL HISTORY OF MALIGNANT NEOPLASM OF BREAST: ICD-10-CM
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
## 2021-10-05 DIAGNOSIS — M79.662 PAIN IN LEFT LOWER LEG: ICD-10-CM

## 2021-10-05 DIAGNOSIS — Z90.710 ACQUIRED ABSENCE OF BOTH CERVIX AND UTERUS: Chronic | ICD-10-CM

## 2021-10-05 DIAGNOSIS — Z90.49 ACQUIRED ABSENCE OF OTHER SPECIFIED PARTS OF DIGESTIVE TRACT: ICD-10-CM

## 2021-10-05 DIAGNOSIS — Z85.41 PERSONAL HISTORY OF MALIGNANT NEOPLASM OF CERVIX UTERI: ICD-10-CM

## 2021-10-05 DIAGNOSIS — Z79.82 LONG TERM (CURRENT) USE OF ASPIRIN: ICD-10-CM

## 2021-10-05 DIAGNOSIS — R53.83 OTHER FATIGUE: ICD-10-CM

## 2021-10-05 DIAGNOSIS — R53.1 WEAKNESS: ICD-10-CM

## 2021-10-05 DIAGNOSIS — Z90.13 ACQUIRED ABSENCE OF BILATERAL BREASTS AND NIPPLES: ICD-10-CM

## 2021-10-05 DIAGNOSIS — Z90.710 ACQUIRED ABSENCE OF BOTH CERVIX AND UTERUS: ICD-10-CM

## 2021-10-05 LAB
ALBUMIN SERPL ELPH-MCNC: 4 G/DL — SIGNIFICANT CHANGE UP (ref 3.5–5.2)
ALP SERPL-CCNC: 64 U/L — SIGNIFICANT CHANGE UP (ref 30–115)
ALT FLD-CCNC: 188 U/L — HIGH (ref 0–41)
ANION GAP SERPL CALC-SCNC: 14 MMOL/L — SIGNIFICANT CHANGE UP (ref 7–14)
APTT BLD: 39.8 SEC — HIGH (ref 27–39.2)
AST SERPL-CCNC: 209 U/L — HIGH (ref 0–41)
BASOPHILS # BLD AUTO: 0.02 K/UL — SIGNIFICANT CHANGE UP (ref 0–0.2)
BASOPHILS NFR BLD AUTO: 0.4 % — SIGNIFICANT CHANGE UP (ref 0–1)
BILIRUB SERPL-MCNC: <0.2 MG/DL — SIGNIFICANT CHANGE UP (ref 0.2–1.2)
BLD GP AB SCN SERPL QL: SIGNIFICANT CHANGE UP
BUN SERPL-MCNC: 19 MG/DL — SIGNIFICANT CHANGE UP (ref 10–20)
CALCIUM SERPL-MCNC: 9.3 MG/DL — SIGNIFICANT CHANGE UP (ref 8.5–10.1)
CHLORIDE SERPL-SCNC: 101 MMOL/L — SIGNIFICANT CHANGE UP (ref 98–110)
CO2 SERPL-SCNC: 21 MMOL/L — SIGNIFICANT CHANGE UP (ref 17–32)
CREAT SERPL-MCNC: 0.6 MG/DL — LOW (ref 0.7–1.5)
EOSINOPHIL # BLD AUTO: 0.1 K/UL — SIGNIFICANT CHANGE UP (ref 0–0.7)
EOSINOPHIL NFR BLD AUTO: 1.8 % — SIGNIFICANT CHANGE UP (ref 0–8)
GLUCOSE SERPL-MCNC: 101 MG/DL — HIGH (ref 70–99)
HCT VFR BLD CALC: 35.1 % — LOW (ref 37–47)
HGB BLD-MCNC: 11.6 G/DL — LOW (ref 12–16)
IMM GRANULOCYTES NFR BLD AUTO: 0.2 % — SIGNIFICANT CHANGE UP (ref 0.1–0.3)
INR BLD: 1 RATIO — SIGNIFICANT CHANGE UP (ref 0.65–1.3)
LYMPHOCYTES # BLD AUTO: 1.23 K/UL — SIGNIFICANT CHANGE UP (ref 1.2–3.4)
LYMPHOCYTES # BLD AUTO: 22 % — SIGNIFICANT CHANGE UP (ref 20.5–51.1)
MCHC RBC-ENTMCNC: 28.9 PG — SIGNIFICANT CHANGE UP (ref 27–31)
MCHC RBC-ENTMCNC: 33 G/DL — SIGNIFICANT CHANGE UP (ref 32–37)
MCV RBC AUTO: 87.3 FL — SIGNIFICANT CHANGE UP (ref 81–99)
MONOCYTES # BLD AUTO: 0.67 K/UL — HIGH (ref 0.1–0.6)
MONOCYTES NFR BLD AUTO: 12 % — HIGH (ref 1.7–9.3)
NEUTROPHILS # BLD AUTO: 3.55 K/UL — SIGNIFICANT CHANGE UP (ref 1.4–6.5)
NEUTROPHILS NFR BLD AUTO: 63.6 % — SIGNIFICANT CHANGE UP (ref 42.2–75.2)
NRBC # BLD: 0 /100 WBCS — SIGNIFICANT CHANGE UP (ref 0–0)
PLATELET # BLD AUTO: 263 K/UL — SIGNIFICANT CHANGE UP (ref 130–400)
POTASSIUM SERPL-MCNC: 5.1 MMOL/L — HIGH (ref 3.5–5)
POTASSIUM SERPL-SCNC: 5.1 MMOL/L — HIGH (ref 3.5–5)
PROT SERPL-MCNC: 6.4 G/DL — SIGNIFICANT CHANGE UP (ref 6–8)
PROTHROM AB SERPL-ACNC: 11.5 SEC — SIGNIFICANT CHANGE UP (ref 9.95–12.87)
RBC # BLD: 4.02 M/UL — LOW (ref 4.2–5.4)
RBC # FLD: 12.5 % — SIGNIFICANT CHANGE UP (ref 11.5–14.5)
SODIUM SERPL-SCNC: 136 MMOL/L — SIGNIFICANT CHANGE UP (ref 135–146)
SURGICAL PATHOLOGY STUDY: SIGNIFICANT CHANGE UP
WBC # BLD: 5.58 K/UL — SIGNIFICANT CHANGE UP (ref 4.8–10.8)
WBC # FLD AUTO: 5.58 K/UL — SIGNIFICANT CHANGE UP (ref 4.8–10.8)

## 2021-10-05 PROCEDURE — 99285 EMERGENCY DEPT VISIT HI MDM: CPT

## 2021-10-05 PROCEDURE — 93971 EXTREMITY STUDY: CPT | Mod: 26,LT

## 2021-10-05 RX ORDER — SODIUM CHLORIDE 9 MG/ML
1000 INJECTION INTRAMUSCULAR; INTRAVENOUS; SUBCUTANEOUS ONCE
Refills: 0 | Status: COMPLETED | OUTPATIENT
Start: 2021-10-05 | End: 2021-10-05

## 2021-10-05 NOTE — ED PROVIDER NOTE - CARE PROVIDER_API CALL
Maya Ferguson (MD)  Surgery  [facility] [address] [place] [address]  Phone: [phone]  Fax: [phone]  Follow Up Time: 1-3 Days

## 2021-10-05 NOTE — ED PROVIDER NOTE - ATTENDING CONTRIBUTION TO CARE
I personally evaluated the patient. I reviewed the Resident’s or Physician Assistant’s note (as assigned above), and agree with the findings and plan except as documented in my note.  70 yo woman with recent bilateral mastectomy with bilateral MARIE drains was sent in by visiting nurse for increased output into MARIE drains.  Also with leg pain.  Was sent in for eval.  Evaluations revealed a well appearing woman with minimally increased bloody discharge into MARIE drains.  Outputs reviewed and were not too concerning.  Workup was ok.  Cleared by her surgeons for discharge.

## 2021-10-05 NOTE — ED PROVIDER NOTE - PROGRESS NOTE DETAILS
NC: spoke to plastic surgery FLOYD Hughes about case NC: spoke to plastic surgery PA Zohra Hughes about case, reports visiting nurse called to report 400cc drainage total in 24 hours and L calf pain and swelling. Aware of lab results, pending duplex, if negative can dc with outpt follow up this week. NC: prelim duplex negative

## 2021-10-05 NOTE — ED PROVIDER NOTE - NS ED ROS FT
Constitutional: (-) fever (+) malaise (-) diaphoresis (-) chills (-) wt. loss (-) body aches (-) night sweats  Eyes: (-) visual changes (-) eye pain (-) eye discharge (-) photophobia (-) FB sensation  ENMT: (-) nasal or chest congestion (-) runny nose (-) sore throat (-) hoarseness  (-) hearing changes (-) ear pain (-) ear discharge or infections (-) neck pain (-) neck stiffness  Cardiac: (-) chest pain  (-) palpitations (-) syncope (-) edema  Respiratory: (-) cough (-) SOB (-) TRAN  GI: (-) nausea (-) vomiting (-) diarrhea (-) abdominal pain   : (-) dysuria (-) increased frequency  (-) hematuria (-) incontinence  MS: (-) back pain (+)LLE pain/swelling  Neuro: (-) headache (-) dizziness (-) numbness/tingling to extremities B/L (-) weakness  Skin: (-) rash (-) laceration    Except as documented in the HPI, all other systems are negative.

## 2021-10-05 NOTE — ED PROVIDER NOTE - CARE PLAN
Principal Discharge DX:	Encounter for post surgical wound check  Secondary Diagnosis:	Swelling of lower extremity   1

## 2021-10-05 NOTE — ED PROVIDER NOTE - CLINICAL SUMMARY MEDICAL DECISION MAKING FREE TEXT BOX
70 yo woman with recent bilateral mastectomy with bilateral MARIE drains.  Visiting nurse was concerned about output from drains.  However, upon review it was minimal.  Workup was ok.  She was stable for discharge and outpatient follow up.

## 2021-10-05 NOTE — ED PROVIDER NOTE - PHYSICAL EXAMINATION
GENERAL: Well-nourished, Well-developed. NAD.  HEAD: No visible or palpable bumps or hematomas. No ecchymosis behind ears B/L.  Eyes: PERRLA, EOMI. No asymmetry. No nystagmus. No conjunctival injection. Non-icteric sclera.  ENMT: MMM.   Neck: Supple. FROM  CVS: RRR. Normal S1,S2. No murmurs appreciated on auscultation   RESP: No use of accessory muscles. Chest rise symmetrical with good expansion. Lungs clear to auscultation B/L. No wheezing, rales, or rhonchi auscultated.  GI: Normal auscultation of bowel sounds in all 4 quadrants. Soft, Nontender, Nondistended. No guarding or rebound tenderness. No CVAT B/L.  Skin: surgical site healing appropriately, no surrounding warmth or erythema. MARIE drains in place, serosanguineous fluid about 20cc.   EXT: Radial and pedal pulses present B/L. (+)mild swelling to L calf. No calf tenderness B/L. No palpable cords. No pedal edema B/L.  Neuro: AA&O x 3. Sensation grossly intact. Strength 5/5 B/L. Gait within normal limits.

## 2021-10-05 NOTE — CONSULT NOTE ADULT - SUBJECTIVE AND OBJECTIVE BOX
PLASTIC SURGERY CONSULT NOTE    Patient: JOSE FRANCISCO THOMAS , 71y ([birth year])Female   MRN: [id number]  Location: [facility] ED  Visit: 10-05-21 Emergency  Date: 10-05-21 @ 17:55    HPI: Patient is a 71 year old female who was just recently discharged from the hospital on 10/2/21 s/p bilateral mastectomy with bilateral breast reconstruction with silicone implants. While here patient was in SICU because she was hypotensive to 70/40. patient responded to fluids, BP stabilized, and was downgraded from SICU. Remained afebrile, tolerated diet, was ambulating. Breast exam was soft, no hematoma, cap refill <2s. Patient was discharged with drain care. Today, patient reports that when her nurse came for drain care they were concerned about increased output from the left breast. On morning of 10/4 blood pressure was elevated as high as 156 systolic, but that afternoon came down to 138/70. At that pint the output started to increase. It has been as high as 55 cc in 2 hours, but over the last 5 hours is about 50 cc. Output serosanguineous. Patient also complaining of L leg pain and dizziness. Pain has been there since after her surgery. Otherwise no fevers, chills, chest pain, abdominal pain, or recent trauma.       PAST MEDICAL & SURGICAL HISTORY:  HTN (hypertension)    Breast cancer  RIGHT BREAST CA- NEW DX    Cervical cancer  1988. S/P RADICAL HYSTERECTOMY    History of appendectomy    H/O abdominal hysterectomy        Home Medications:  aspirin 81 mg oral tablet: 1 tab(s) orally once a day (30 Sep 2021 09:16)  candesartan 8 mg oral tablet: 2 tab(s) orally once a day (30 Sep 2021 09:16)  CeleBREX:  (30 Sep 2021 09:16)  metoprolol succinate 25 mg oral capsule, extended release: 1 cap(s) orally once a day (30 Sep 2021 09:16)  Tylenol:  (30 Sep 2021 09:16)        VITALS:  T(F): 97.9 (10-05-21 @ 14:54), Max: 97.9 (10-05-21 @ 14:54)  HR: 77 (10-05-21 @ 14:54) (77 - 77)  BP: 137/71 (10-05-21 @ 14:54) (137/71 - 137/71)  RR: 18 (10-05-21 @ 14:54) (18 - 18)  SpO2: 99% (10-05-21 @ 14:54) (99% - 99%)    PHYSICAL EXAM:  General: NAD, AAOx3, calm and cooperative  Cardiac: RRR S1, S2  Respiratory: CTAB, normal respiratory effort, breath sounds equal BL  Breast: Breasts equal bilaterally. Mild tenderness (consistent with day of discharge). Capillary refill <2s. Soft. No hematomas. No clotting in drains.   Abdomen: Soft, non-distended, non-tender, no rebound, no guarding  MSK: Soft bilaterally. No calf swelling or erythema or warmth. Mild tenderness from Left ankle up to left thigh.   Skin: Warm/dry, normal color, no jaundice      LAB/STUDIES:                        11.6   5.58  )-----------( 263      ( 05 Oct 2021 15:41 )             35.1     10-05    136  |  101  |  19  ----------------------------<  101<H>  5.1<H>   |  21  |  0.6<L>    Ca    9.3      05 Oct 2021 15:41    TPro  6.4  /  Alb  4.0  /  TBili  <0.2  /  DBili  x   /  AST  209<H>  /  ALT  188<H>  /  AlkPhos  64  10-05    PT/INR - ( 05 Oct 2021 15:48 )   PT: 11.50 sec;   INR: 1.00 ratio         PTT - ( 05 Oct 2021 15:48 )  PTT:39.8 sec  LIVER FUNCTIONS - ( 05 Oct 2021 15:41 )  Alb: 4.0 g/dL / Pro: 6.4 g/dL / ALK PHOS: 64 U/L / ALT: 188 U/L / AST: 209 U/L / GGT: x             IMAGING:  Duplex pending

## 2021-10-05 NOTE — ED PROVIDER NOTE - TOBACCO USE
Never smoker Hatchet Flap Text: The defect edges were debeveled with a #15 scalpel blade.  Given the location of the defect, shape of the defect and the proximity to free margins a hatchet flap was deemed most appropriate.  Using a sterile surgical marker, an appropriate hatchet flap was drawn incorporating the defect and placing the expected incisions within the relaxed skin tension lines where possible.    The area thus outlined was incised deep to adipose tissue with a #15 scalpel blade.  The skin margins were undermined to an appropriate distance in all directions utilizing iris scissors.

## 2021-10-05 NOTE — ED PROVIDER NOTE - PATIENT PORTAL LINK FT
You can access the FollowMyHealth Patient Portal offered by [facility] by registering at the following website: [email or web address]. By joining [facility]’s FollowMyHealth portal, you will also be able to view your health information using other applications (apps) compatible with our system.

## 2021-10-05 NOTE — ED PROVIDER NOTE - OBJECTIVE STATEMENT
70 yo F pmhx HTN, breast cancer s/p bilateral mastectomy 9/29/21 by Dr Ferguson and Dr Barrios, sent to the ED by Dr Barrios for evaluation, pt visiting nurse came this morning to change dressings and MARIE drains, notes increased bloody drainage, about 30cc from each MARIE drain, pt also reporting fatigue, generalized weakness and lightheadedness. Pt reports LLE "soreness", sent for vascular duplex. Denies fever, chills, nausea, vomiting, chest pain, sob, dizziness, headache, numbness/tingling, abd pain. 72 yo F pmhx HTN, breast cancer s/p bilateral mastectomy 9/29/21 by Dr Ferguson and Dr Barrios, sent to the ED by Dr Barrios for evaluation, pt visiting nurse came this morning to change dressings and MARIE drains, notes increased bloody drainage, about 100cc from each MARIE drain in past 24 hours, pt also reporting fatigue, generalized weakness and lightheadedness. Pt reports LLE "soreness", sent for vascular duplex. Denies fever, chills, nausea, vomiting, chest pain, sob, dizziness, headache, numbness/tingling, abd pain.

## 2021-10-05 NOTE — ED ADULT NURSE NOTE - OBJECTIVE STATEMENT
Pt c/o increase bloody drainage from MARIE drain, patient s/p b/l mastectomy on 9/29 for breast cancer with lightheadedness and fatigue, accompanied by left lower leg pain and swelling after surgery. Sent in by visiting nurse for r/o DVT .

## 2021-10-07 NOTE — ED ADULT NURSE NOTE - NS ED NURSE RECORD ANOTHER HT AND WT
As certified below, I, or a nurse practitioner or physician assistant working with me, had a face-to-face encounter that meets the physician face-to-face encounter requirements. Yes

## 2021-10-08 ENCOUNTER — NON-APPOINTMENT (OUTPATIENT)
Age: 71
End: 2021-10-08

## 2021-10-11 ENCOUNTER — APPOINTMENT (OUTPATIENT)
Dept: PLASTIC SURGERY | Facility: CLINIC | Age: 71
End: 2021-10-11
Payer: MEDICARE

## 2021-10-11 DIAGNOSIS — I37.1 NONRHEUMATIC PULMONARY VALVE INSUFFICIENCY: ICD-10-CM

## 2021-10-11 DIAGNOSIS — M41.9 SCOLIOSIS, UNSPECIFIED: ICD-10-CM

## 2021-10-11 DIAGNOSIS — I10 ESSENTIAL (PRIMARY) HYPERTENSION: ICD-10-CM

## 2021-10-11 DIAGNOSIS — E83.42 HYPOMAGNESEMIA: ICD-10-CM

## 2021-10-11 DIAGNOSIS — I07.1 RHEUMATIC TRICUSPID INSUFFICIENCY: ICD-10-CM

## 2021-10-11 DIAGNOSIS — C50.411 MALIGNANT NEOPLASM OF UPPER-OUTER QUADRANT OF RIGHT FEMALE BREAST: ICD-10-CM

## 2021-10-11 DIAGNOSIS — Z85.41 PERSONAL HISTORY OF MALIGNANT NEOPLASM OF CERVIX UTERI: ICD-10-CM

## 2021-10-11 DIAGNOSIS — Z90.710 ACQUIRED ABSENCE OF BOTH CERVIX AND UTERUS: ICD-10-CM

## 2021-10-11 DIAGNOSIS — I95.81 POSTPROCEDURAL HYPOTENSION: ICD-10-CM

## 2021-10-11 DIAGNOSIS — E86.1 HYPOVOLEMIA: ICD-10-CM

## 2021-10-11 DIAGNOSIS — C50.911 MALIGNANT NEOPLASM OF UNSPECIFIED SITE OF RIGHT FEMALE BREAST: ICD-10-CM

## 2021-10-11 DIAGNOSIS — C77.3 SECONDARY AND UNSPECIFIED MALIGNANT NEOPLASM OF AXILLA AND UPPER LIMB LYMPH NODES: ICD-10-CM

## 2021-10-11 PROCEDURE — 99024 POSTOP FOLLOW-UP VISIT: CPT

## 2021-10-11 NOTE — ASSESSMENT
[FreeTextEntry1] : 72 y/o F with newly diagnosed right breast IDC/DCIS and +axillary LN who has elected to undergo bilateral mastectomy\par \par Now POD #12 s/p bilateral MRM and SLNB with immediate direct to implant reconstruction: left prepectoral, right subpectoral.\par \par -Bilateral SL drains removed\par -Continue IMF drain monitoring\par -Continue surgical bra with fluff\par -No heavy lifting\par -Renewed PO abx x1 more week: Augmentin\par -F/u with Dr. Ferguson as scheduled\par -F/u 1 week for drain removal\par \par Due to COVID-19, pre-visit patient instructions were explained to the patient and their symptoms were checked upon arrival. Masks were used by the healthcare provider and staff and the examination room was cleaned after the patient visit concluded\par

## 2021-10-11 NOTE — HISTORY OF PRESENT ILLNESS
[FreeTextEntry1] : Pt is a 72 y/o post menopausal F, , with PMH of scoliosis, HTN, Cervical CA s/p hysterectomy, and newly diagnosed RIGHT breast cancer who presents for breast reconstruction consultation. Pt states she self-palpated a lesion in February and underwent mammogram/US followed by biopsy confirming IDC and DCIS as well as right axillary +LN. Had not had a mammogram since age 32. Denies nipple bleeding, discharge, or inversion. She has decided to pursue bilateral mastectomy.   Dr. Ferguson recommend [facility].\par \par Per Dr. Tejada, will need either adjuvant or neoadjuvant chemotherapy. Pt has elected to do adjuvant chemotherapy.\par Pt states she would like to least amount of surgery possible for her reconstruction.\par \par H/o right lumpectomy x2 : benign nodules\par +family h/o breast cancer: paternal aunt, dx in her 40s\par \par Ht 5' Wt 128 lb  \par Current bra size: 34B, happy at this size\par Denies h/o VTE or MRSA infections\par Occ: Works at [employer]\par Here today with her daughter Sierra\par \par Interval hx (10/11/21): Pt presents today POD #12 s/p bilateral MRM and SLNB with immediate direct to implant reconstruction: left prepectoral, right subpectoral. Pt went to ED after discharge for increased left breast drain output and LLE swelling. Duplex negative. Now feeling better. Drain output: 1 (R IMF): , 2 (R SL): , 3 (L SL): 10/7/7, 4 (L IMF): 10/5/5

## 2021-10-11 NOTE — PHYSICAL EXAM
[de-identified] : well-appearing, NAD [de-identified] : EOMI [de-identified] : supple [de-identified] : nonlabored breathing [de-identified] : scoliosis with greater prominence on left side [de-identified] : Bilateral breasts implants soft and mobile, left appears larger than right due to implant position (left prepectoral, right subpectoral), nontender, no significant erythema or ecchymoses, incisions c/d/i, drains in place and functional with SS output [de-identified] : soft, nontender, nondistended; small deflated pannus (grade 1), well-healed hysterectomy scar

## 2021-10-12 ENCOUNTER — APPOINTMENT (OUTPATIENT)
Dept: BREAST CENTER | Facility: CLINIC | Age: 71
End: 2021-10-12
Payer: COMMERCIAL

## 2021-10-12 ENCOUNTER — APPOINTMENT (OUTPATIENT)
Age: 71
End: 2021-10-12

## 2021-10-12 VITALS
SYSTOLIC BLOOD PRESSURE: 139 MMHG | BODY MASS INDEX: 25.13 KG/M2 | HEIGHT: 60 IN | DIASTOLIC BLOOD PRESSURE: 82 MMHG | TEMPERATURE: 97 F | WEIGHT: 128 LBS

## 2021-10-12 PROCEDURE — 99024 POSTOP FOLLOW-UP VISIT: CPT

## 2021-10-12 NOTE — PHYSICAL EXAM
[Normocephalic] : normocephalic [Atraumatic] : atraumatic [EOMI] : extra ocular movement intact [Examined in the supine and seated position] : examined in the supine and seated position [No Edema] : no edema [de-identified] : Bilateral breasts implants soft and mobile, left appears larger than right due to implant position (left prepectoral, right subpectoral), mild tenderness, no significant erythema or ecchymoses, incisions c/d/i, 2 drains are in place and functional with SS output

## 2021-10-12 NOTE — ASSESSMENT
[FreeTextEntry1] : JOSE FRANCISCO THOMAS is a 72 y/o post menopausal F who presents with a self palpated RIGHT breast cancer, [id number], ER/VT pos, Her 2 neg, stage IIB AJCC 8th edition. She is s/p  LEFT SSM with sentinel LN bx and RIGHT SSM with axillary dissection on 09/29/21 \par \par Bilateral breasts implants soft and mobile, left appears larger than right due to implant position (left prepectoral, right subpectoral), mild tenderness, no significant erythema or ecchymoses, incisions c/d/i, 2 drains are in place and functional with SS output \par \par \par AS REVIEW\par Her most recent imaging was a b/l dx mammogram and US on 6/17/2021 which revealed the following RIGHT breast findings: \par - In the axilla, @10-11N12, cortically thickened axillary lymph node measuring 2.2 cm --> METASTATIC CARCINOMA\par -C/W palpable concern @10-11N5-8, spiculated mass with associated coarse calcifications, measures 2.7 x 1.2 x 1.8 cm --> IDC\par -@11N10, circumscribed hypoechoic mass, measures 1.3 x 1.2 x 0.4 cm, --> DILATED DUCT\par -@6N5, circumscribed hypoechoic mass measuring 0.3 x 0.3 x 0.2 cm\par -@6N4, circumscribed hypoechoic mass measuring 0.6 x 0.6 x 0.2 cm --> hyalinized fibroadenoma\par \par We discussed fibroadenomas.  These are benign lesions without any malignant potential.  They are hormonally influenced and can increase or decrease in size and can also regress spontaneously.  They are considered proliferative lesions without atypia.  Patients with these lesions have been found to have a slightly increased relative risk of breast cancer compared to the reference population.  Surgical excision is recommended when the diagnosis in unclear, the lesion is causing pain or breast deformity, or if it is rapidly enlarging. \par \par She is currently asymptomatic from her right breast fibroadenoma, so no intervention is indicated for this. \par \par We discussed her new diagnosis of stage IIB RIGHT breast invasive ductal carcinoma (IDC). \par \par She may need systemic chemotherapy given the size of the tumor and positive lymph node.  After speaking with the medical oncologist, we will have her biopsy specimen sent for an ONCOTYPE to determine if she would benefit from chemotherapy. Her ONCOTYPE score was 27, and was recommended for chemotherapy with AC + T, to be given before or after surgery.  After discussion, she has decided to proceed with surgery first.  \par \par OR: RIGHT MODIFIED RADICAL MASTECTOMY, EXCISION OF RIGHT AXILLARY LYMPH NODE WITH RF LOCALIZATION; LEFT MASTECTOMY, SENTINEL LYMPH NODE MAPPING AND BIOPSY, BILATERAL PECTORAL BLOCKS, IMMEDIATE RECONSTRUCTION \par \par We discussed the option of giving chemotherapy before or after surgery.  If given before surgery, this is considered neoadjuvant chemotherapy.  The benefits of undergoing neoadjuvant chemotherapy is that it will provide us with better local control of her breast cancer especially if she has a good pathologic response.  If she has a complete clinical response in her breast and lymph nodes, we may be able to avoid an axillary lymph node dissection which would reduce the morbidity of her surgery.  In addition, it gives us prognostic information regarding her tumor response to chemotherapy.  Patients who have a complete pathologic response (pCR) were found to have an improved prognosis compared to women who do not have a pCR.  \par \par However, prior to starting chemotherapy, and given the locally advanced nature of her disease, I would also like to obtain staging scans with a PET/CT. This revealed an FDG avid R breast mass measures 3.6 x 2 cm and an FDG avid R axillary nodule, measuring 2 cm.  She did not have any other sites of abnormal FDG uptake. \par \par In regards to radiation therapy, depending on her margins, involvement of her pectoralis muscle, and number of lymph nodes positive for disease, she will need post mastectomy radiation.\par \par At the completion of all these therapy, she will need endocrine therapy, likely with an AI as she is post menopausal, which will reduce her rate of recurrence by about 50% to 2/3rds.\par \par Per ASBrS consensus guidelines, any patient with a diagnosis of breast cancer should be offered panel genetic testing as 10% of these patients were found to have a mutation.  THis was offered to her and she would like to proceed with panel genetic testing.  Her blood was drawn today. 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  This is not a pathogenic mutation, however, she should continue to follow up these results, as there is a possibility that this could be re-classified in the future.  She will be referred to Kaye, genetic counselor, following her surgery. \par \par All of her questions were answered.  She knows to call with any further questions or concerns. \par \par PLAN: \par -SOZO: -4.4\par -INVITAE Panel genetic testing -- VUS in DICER1, referral for genetic counselor \par -ONCOTYPE on biopsy specimen -- 27 \par -follow up 6 months for CBE \par -follow up med/onc\par -follow up rad/onc\par \par -OR: RIGHT MODIFIED RADICAL MASTECTOMY, EXCISION OF RIGHT AXILLARY LYMPH NODE WITH RF LOCALIZATION; LEFT MASTECTOMY, SENTINEL LYMPH NODE MAPPING AND BIOPSY, BILATERAL PECTORAL BLOCKS, IMMEDIATE RECONSTRUCTION \par -DIAGNOSIS: RIGHT BREAST CANCER \par

## 2021-10-12 NOTE — DATA REVIEWED
[FreeTextEntry1] : [facility] Accession Number : [id number]\par Patient:   JOSE FRANCISCO THOMAS\par \par \par Accession:                             [id number]\par \par Collected Date/Time:                   9/29/2021 09:11 EDT\par Received Date/Time:                    9/29/2021 09:32 EDT\par \par Surgical Pathology Report - Auth (Verified)\par \par Specimen(s) Submitted\par 1  Left breast\par 2  Left breast sentinel lymph node #1\par 3  Left breast sentinel lymph node #2\par 4  Right breast\par 5  Right axillary contents\par \par Final Diagnosis\par \par 1. Breast, left, simple skin-sparing mastectomy:\par - Atrophic predominantly fatty breast tissue with focal atypical lobular\par hyperplasia (ALH), a small hyalinized fibroadenoma, and proliferative\par type fibrocystic changes associated with microcalcifications.\par - Unremarkable nipple, skin, and deep fascial margin.\par \par 2. Breast, left axillary sentinel lymph node #1, biopsy:\par -  One benign lymph node (0/1).  Negative for carcinoma with evaluation\par of multiple H T E levels and with negative    immunohistochemical  stains\par for  cytokeratins (CK AE1/AE3 and CK 8/18).\par \par 3. Breast, left axillary sentinel lymph node #2. biopsy:\par -  One benign lymph node (0/1).  Negative for carcinoma with evaluation\par of multiple H T E levels and with negative    immunohistochemical  stains\par for  cytokeratins (CK AE1/AE3 and CK 8/18).\par \par 4. Breast, right, modified radical skin-sparing mastectomy:\par - Upper outer quadrant healing prior biopsy site changes with multifocal\par invasive poorly differentiated micropapillary carcinoma, 3.7 cm\par (radiographic measurement), and invasive well differentiated tubulo-\par lobular variant of ductal carcinoma, 0.8 cm (microscopic measurement);\par both associated with focal necrosis.\par - Non-extensive ductal carcinoma in-situ (DCIS), cribriform and\par micropapillary types with comedo necrosis and microcalcifications,\par intermediate to focally high nuclear grade.\par - Numerous foci of lymphovascular invasion are present.\par - The invasive tumor extends to focally involve (touches both ink and\par cautery) the blue inked surface.\par - The invasive tumor also extends to infiltrate an adjacent hyalinized\par fibroadenoma.\par - Atrophic predominantly fatty breast tissue with focal atypical lobular\par hyperplasia (ALH) and proliferative type fibrocystic changes.\par - Unremarkable nipple, skin, and deep fascial margin. Negative for\par carcinoma.\par -  Pending special studies for ER/WV proteins, proliferation index\par (Ki-67), and HER2/FREEMAN   oncoprotein expression and/or gene amplification,\par with results to be reported as a separate addendum.\par - AJCC 8th Edition Pathologic Stage, m(2)pT2, pN2a, pMx.\par \par 5. Breast, right axillary contents, excision:\par - Metastatic carcinoma present in eight out of twelve axillary lymph\par nodes (8/12) with extensive extracapsular extension seen.\par - The largest node contains a metallic "ring-shaped" biopsy clip and\par shows focal prior biopsy site changes.\par - Adjacent fibroadipose connective tissue containing axillary ectopic\par atrophic benign fatty breast tissue.\par [facility] Accession Number : [id number]\par Patient:   JOSE FRANCISCO THOMAS\par \par \par Accession:                             [id number]\par \par Collected Date/Time:                   9/29/2021 09:11 EDT\par Received Date/Time:                    9/29/2021 09:32 EDT\par \par Addendum Report - Auth (Verified)\par \par Addendum\par 4)  Immunohistochemical studies were performed at [facility], [address] [place] [address],\par [id number] (see NOTE).  The results are as follows:\par \par % POSITIVE STAINING INTENSITY\par MICROPAPILLARY TUMOR (4L):\par ESTROGEN RECEPTOR   85  MODERATE/STRONG (2+/3+)\par PROGESTERONE RECEPTOR    15 STRONG (3+)\par Ki-67   20-25\par \par TUBULO-LOBULAR TUMOR (4X):\par ESTROGEN RECEPTOR   100  STRONG (3+)\par PROGESTERONE RECEPTOR    90 MODERATE/STRONG (2+/3+)\par Ki-67   3-5\par \par Comment: No current consensus exists as to the methodological evaluation,\par optimal cutoff and algorithm of clinical usage of the Ki-67 labeling\par proliferative index. Proposed cutoffs noted in the literature vary from:\par Unfavorable   >10% or >14% or >20% or  >30% ([place]) .\par Intermediate   10-14%, 10-20%,  16-30% ([place]).\par Favorable   <10% or <15% ([place]) .\par \par \par A positive test result is defined as positive nuclear staining in >1% of\par tumor cells.\par A negative test result is defined as nuclear staining in <1% of tumor\par cells.\par Type of specimen fixation: 10% buffered formalin\par \par Time to fixation: <1 HR\par Time in formalin: >6 HRS, <48 HRS\par \par Detection system used: Ultra View Universal DAB detection kit.\par Antibody clone used:\par Anti-ER Rabbit Monoclonal, [place], clone SP1.\par Anti-WV Rabbit Monoclonal, [place], clone IE2.\par \par Appropriate positive and negative controls were used and evaluated in\par conjunction with this study.\par NOTE: All controls show appropriate reactivity.\par \par This test was developed and its performance characteristics determined by\[facility].  It has not been cleared or approved\par by the U.[place].  Food and Drug Administration.  The FDA does not require\par this test to go through premarket FDA review.  This test is used for\par clinical purposes.  It should not be regarded as investigational or for\par research. This assay has not been validated for decalcified tissues.\par Results should be interpreted with caution given the likelihood of\par false negativity on decalcified specimens.  This laboratory is regulated\par under the Clinical Laboratory Improvement Amendments of 1988 (CLIA) as\par qualified to perform high complexity clinical laboratory testing.\par \par \par The interpretation of this test was performed at [facility], [address] [place] [address].\par \par \par Verified by: Zan Baker M.D.\par (Electronic Signature)\par Reported on: 10/06/21 15:48 EDT, [address] [place], [place] [address]par Phone: [phone]   Fax: [phone]\par _________________________________________________________________\par Addendum Report - Auth (Verified)\par \par Addendum\par \par SURGICAL PATHOLOGY CANCER CASE SUMMARY\par \par INVASIVE CARCINOMA OF THE BREAST\par \par PROCEDURE: Total mastectomy\par SPECIMEN LATERALITY: Right\par TUMOR SITE: INVASIVE CARCINOMA: Upper outer quadrant\par TUMOR SIZE: 37 mm\par HISTOLOGIC TYPE: Invasive carcinoma, tubulo-lobular variant\par Invasive micropapillary carcinoma\par HISTOLOGIC GRADE (Richard Histologic Score)\par GLANDULAR (ACINAR)/TUBULAR DIFFERENTIATION: Score 2\par NUCLEAR PLEOMORPHISM: Score 3\par MITOTIC RATE: Score 3\par OVERALL GRADE: Grade 3\par TUMOR FOCALITY: Multiple foci of invasive carcinoma\par Number of foci: 2\par Sizes of individual foci: 37 mm, 8 mm\par DUCTAL CARCINOMA IN SITU (DCIS): Present, negative for EIC\par ARCHITECTURAL PATTERNS: [place], cribriform, micropapillary\par \par NUCLEAR GRADE: Grade II (intermediate) to Grade III (high)\par NECROSIS: Present\par LOBULAR CARCINOMA IN SITU (LCIS): No LCIS in specimen\par MARGINS\par INVASIVE CARCINOMA: Uninvolved by invasive carcinoma\par DCIS: Uninvolved by DCIS\par REGIONAL LYMPH NODES: Involved by tumor cells\par Number of lymph nodes with macrometastases (>2 mm): 8\par Number of lymph nodes with micrometastases (>0.2 mm to 2 mm  and/or\par >200 cells): 0\par Number of lymph nodes with isolated tumor ells (   <0.2 mm and  <200\par cells): 0\par Extranodal extension: Present\par Number of lymph nodes examined: 12\par Number of sentinel nodes examined: 0\par TREATMENT EFFECT: No known presurgical therapy\par LYMPHOVASCULAR INVASION: Present\par DERMAL LYMPHOVASCULAR INVASION: Present\par PATHOLOGIC STAGING\par TNM Descriptors: m (multiple foci of invasive carcinoma\par PRIMARY TUMOR: pT2: Tumor >20 mm but    <50 mm in greatest dimension\par REGIONAL LYMPH NODES: pN2a: Metastases in 4 to 9 axillary lymph\par nodes\par ADDITIONAL PATHOLOGIC FINDINGS: Fibroadenoma\par MICROCALCIFICATIONS: Present in DCIS\par CLINICAL HISTORY: Radiologic finding: mass or architectural distortion\par BREAST BIOMARKER REPORTING: Pending\par \par Verified by: Zan Baker M.D.\par (Electronic Signature)\par Reported on: 10/06/21 12:52 EDT, [address], [place] [address]\par Phone: [phone]   Fax: [phone]\par _________________________________________________________________\par Surgical Pathology Report - Auth (Verified)\par \par Addendum\par 4) HER2 FISH Analysis was performed at [facility].  Their report is attached below.\par ----------------------------------------------------------\par \par [facility]\par [address] [address]\[facility]  [address]\par [phone]\par \par \par INTEGRATED SPECIMEN#: [id number]\par INTEGRATED CASE#: [id number]\par DATE SIGNED OUT: 10/9/2021 by Nieves España M.D., Ph.D.\[facility] ID#: [id number]\par \par BODY SITE: Breast, Right Upper Outer Quadrant\par \par CLINICAL DATA: Well differentiated tubulo-lobular variant of ductal\par carcinoma\par \par MARKER               RESULTS            INTERPRETATION\par HER2/CEP17              1.8                              Negative

## 2021-10-12 NOTE — REASON FOR VISIT
[Post Op: _________] : a [unfilled] post op visit [FreeTextEntry1] : s/p  LEFT SSM with sentinel LN bx and RIGHT SSM with axillary dissection on 09/29/21

## 2021-10-12 NOTE — HISTORY OF PRESENT ILLNESS
[FreeTextEntry1] : JOSE FRANCISCO THOMAS is a 70 y/o post menopausal F who presents with a self palpated RIGHT breast cancer, [id number], ER/WI pos, Her 2 neg, stage IIIB AJCC 8th edition. \par \par 2021 -- b/l SSM, R ALND, L SLN Bx with immediate reconstruction \par 1. 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 LEFT breast \par -atypical lobular hyperplasia \par \par Of note, her daughter, Sierra was present for the entirety of this consultation. \par \par She first palpated a mass in the R UOQ, for the past year.  She has noticed worsening pain in that area and does think that it has increased in size since she first felt it.  She denies any left sided breast complaints and denies any nipple discharge or retraction. \par \par Her work up was as follows:\par 2021 - B/L Dx Mammo & Sono:\par -breasts are heterogeneously dense\par -R: UOQ, spiculated mass, with calcifications, c/w US finding below \par -R: Medial to this mass in the posterior depth, an area of architectural distortion. \par -R: Slightly superior to this mass, there is a an additional circumscribed mass also seen on concurrent ultrasound.\par Right breast:\par - In the axilla, at the 10 to 11:00 position 12 cm from the nipple, there is a cortically thickened axillary lymph node measuring 2.2 cm --> BIOPSY \par -Corresponding to the area palpable concern at the 10 to 11:00 position 5 to 8 cm from the nipple, there is a spiculated mass with associated coarse calcifications measuring 2.7 x 1.2 x 1.8 cm --> BIOPSY \par -At the 11:00 position 10 cm from the nipple, there is a circumscribed hypoechoic mass measuring 1.3 x 1.2 x 0.4 cm, corresponding to mammographic findings --> BIOPSY \par -At the 6:00 position 5 cm from the nipple, there is a circumscribed hypoechoic mass measuring 0.3 x 0.3 x 0.2 cm.\par -At the 6:00 position 4 cm from the nipple, there is a circumscribed hypoechoic mass measuring 0.6 x 0.6 x 0.2 cm --> BIOPSY \par Left breast:\par No suspicious solid or cystic masses. No axillary adenopathy.\par BI-RADS Category 5: Highly Suggestive of Malignancy\par \par \par 2021 - US Guided Core Bx:\par 1. Right, 10-11:00 N5-8, 2.7cm: (top-hat)\par - Invasive moderately differentiated ductal carcinoma with dominant micropapillary features.\par - Ductal carcinoma in-situ (DCIS), cribriform type with comedo necrosis and microcalcifications, intermediate nuclear grade.\par - Foci of lymphovascular invasion are present.\par -ER  (+)  99%\par -WI  (+)  15%\par -HER2  (-) on IHC\par -Ki-67  20%\par \par 2. Right, 11:00 N10, 1.3cm: (mini-cork)\par - Benign atrophic fatty breast tissue with a dominant macrocyst\par wall fragment demonstrating an attenuated epithelial lining;\par consistent with a dilated duct.\par Findings are benign concordant.\par \par 3. Right, 6:00 N4, 0.6cm: (stoplight clip) \par - Hyalinized fibroadenoma.\par Findings are benign concordant.\par \par 4. Right, axillary mass 10-12:00 N12, 2.2cm: (twirl)\par - Lymph node fragments containing metastatic carcinoma (),\par the largest contiguous focus of which measures 6.0 mm\par (microscopic measurement).\par - No extracapsular extension is identified in this biopsy material.\par Findings are malignant concordant.\par \par HISTORICAL RISK FACTORS: \par -no prior breast biopsies, hx of lumpectomy for reportedly benign disease  \par -family history of breast cancer in a paternal aunt at age 45 \par -, age at first live birth was 24 \par -no prior OCP use \par -s/p DAVIE, unilateral oophorectomy in  \par \par Bx specimen sent for Oncotype Dx = 27.\par \par INTERVAL HISTORY:\par 2021 --\par Jose Francisco returns for a surgical re discussion.\par She met with Dr. Keyla Tejada of medical oncology and discussed option of neoadjuvant chemotherapy.\par As per Dr. Tejada, after informed discussion, Jose Francisco is thinking to have mastectomy. She wants to proceed with surgery first and have chemotherapy after surgery. She understands that she will need to have right axillary lymph node dissection which involves more tissue removed and increased risk of lymphedema. \par \par She has also met with Dr. Priscilla Barrios of plastic surgery for consultation. She has elected to proceed with bilateral mastectomy.  Dr. Barrios feels she is a good candidate for prosthetic breast reconstruction. After a long discussion she has decided to proceed with direct-to-silicone implant; possible tissue expander with mesh, followed by silicone implant at a later date. \par \par SInce her last visit, she also underwent a PET/CT on 2021 and breast MRI on 2021 which did not reveal any additional areas of disease aside from her her right breast mass and metastatic right axillary lymph node. \par \par INTERVAL HISTORY: 10/12/21\par Jose Francisco is 71 year old female here for her FIRST POST OP apt s/p  LEFT SSM with sentinel LN bx and RIGHT SSM with axillary dissection on 21 \par \par She is feeling well\par She denies any fever/chills or erythema and / or drainage related to incision area. \par Her pain is well controlled, only complains of mild tenderness of the area.\par \par \par \par

## 2021-10-15 ENCOUNTER — APPOINTMENT (OUTPATIENT)
Dept: PLASTIC SURGERY | Facility: CLINIC | Age: 71
End: 2021-10-15
Payer: COMMERCIAL

## 2021-10-15 PROCEDURE — 99024 POSTOP FOLLOW-UP VISIT: CPT

## 2021-10-15 NOTE — PHYSICAL EXAM
[de-identified] : well-appearing, NAD [de-identified] : EOMI [de-identified] : supple [de-identified] : nonlabored breathing [de-identified] : scoliosis with greater prominence on left side [de-identified] : Bilateral breasts implants soft and mobile, left appears larger than right due to implant position (left prepectoral, right subpectoral), nontender, left with developing ecchymoses, incisions c/d/i, drains in place and functional with SS output - left drain with dark sanguineous output [de-identified] : soft, nontender, nondistended; small deflated pannus (grade 1), well-healed hysterectomy scar

## 2021-10-15 NOTE — ASSESSMENT
[FreeTextEntry1] : 72 y/o F with newly diagnosed right breast IDC/DCIS and +axillary LN who has elected to undergo bilateral mastectomy\par \par Now POD #16 s/p bilateral MRM and SLNB with immediate direct to implant reconstruction: left prepectoral, right subpectoral.\par \par -Right IMF drain removed, continue left drain monitoring\par -No heavy lifting\par -Continue PO Augmentin to completion\par -F/u with Dr. Ferguson as scheduled\par -F/u Monday or Tuesday for possible remaining drain removal\par \par Pt seen and examined with Dr. Barrios\par \par Due to COVID-19, pre-visit patient instructions were explained to the patient and their symptoms were checked upon arrival. Masks were used by the healthcare provider and staff and the examination room was cleaned after the patient visit concluded\par

## 2021-10-15 NOTE — HISTORY OF PRESENT ILLNESS
[FreeTextEntry1] : Pt is a 70 y/o post menopausal F, , with PMH of scoliosis, HTN, Cervical CA s/p hysterectomy, and newly diagnosed RIGHT breast cancer who presents for breast reconstruction consultation. Pt states she self-palpated a lesion in February and underwent mammogram/US followed by biopsy confirming IDC and DCIS as well as right axillary +LN. Had not had a mammogram since age 32. Denies nipple bleeding, discharge, or inversion. She has decided to pursue bilateral mastectomy.   Dr. Ferguson recommend SSM.\par \par Per Dr. Tejada, will need either adjuvant or neoadjuvant chemotherapy. Pt has elected to do adjuvant chemotherapy.\par Pt states she would like to least amount of surgery possible for her reconstruction.\par \par H/o right lumpectomy x2 : benign nodules\par +family h/o breast cancer: paternal aunt, dx in her 40s\par \par Ht 5' Wt 128 lb  \par Current bra size: 34B, happy at this size\par Denies h/o VTE or MRSA infections\par Occ: Works at [employer]\par Here today with her daughter Sierra\par \par Interval hx (10/11/21): Pt presents today POD #12 s/p bilateral MRM and SLNB with immediate direct to implant reconstruction: left prepectoral, right subpectoral. Pt went to ED after discharge for increased left breast drain output and LLE swelling. Duplex negative. Now feeling better. Drain output: 1 (R IMF): , 2 (R SL): , 3 (L SL): 10/7/7, 4 (L IMF): 10/5/5\par \par Interval hx (10/15/21):  Pt presents today POD #16 s/p bilateral MRM and SLNB with immediate direct to implant reconstruction: left prepectoral, right subpectoral. Taking PO abx as prescribed. C/o warmth to BL breasts but denies f/c or worsening pain or redness. Drain output: R- , L- /

## 2021-10-18 ENCOUNTER — APPOINTMENT (OUTPATIENT)
Dept: PLASTIC SURGERY | Facility: CLINIC | Age: 71
End: 2021-10-18
Payer: COMMERCIAL

## 2021-10-25 ENCOUNTER — APPOINTMENT (OUTPATIENT)
Dept: PLASTIC SURGERY | Facility: CLINIC | Age: 71
End: 2021-10-25
Payer: COMMERCIAL

## 2021-10-25 PROCEDURE — 99024 POSTOP FOLLOW-UP VISIT: CPT

## 2021-10-25 NOTE — PHYSICAL EXAM
[de-identified] : well-appearing, NAD [de-identified] : EOMI [de-identified] : supple [de-identified] : nonlabored breathing [de-identified] : scoliosis with greater prominence on left side [de-identified] : Bilateral breasts implants soft and mobile, left appears larger than right due to implant position (left prepectoral, right subpectoral), nontender, left with resolving ecchymoses, incisions c/d/i, left drain with minimal dark sanguineous output [de-identified] : soft, nontender, nondistended; small deflated pannus (grade 1), well-healed hysterectomy scar

## 2021-10-25 NOTE — HISTORY OF PRESENT ILLNESS
[FreeTextEntry1] : Pt is a 72 y/o post menopausal F, , with PMH of scoliosis, HTN, Cervical CA s/p hysterectomy, and newly diagnosed RIGHT breast cancer who presents for breast reconstruction consultation. Pt states she self-palpated a lesion in February and underwent mammogram/US followed by biopsy confirming IDC and DCIS as well as right axillary +LN. Had not had a mammogram since age 32. Denies nipple bleeding, discharge, or inversion. She has decided to pursue bilateral mastectomy.   Dr. Ferguson recommend [facility].\par \par Per Dr. Tejada, will need either adjuvant or neoadjuvant chemotherapy. Pt has elected to do adjuvant chemotherapy.\par Pt states she would like to least amount of surgery possible for her reconstruction.\par \par H/o right lumpectomy x2 : benign nodules\par +family h/o breast cancer: paternal aunt, dx in her 40s\par \par Ht 5' Wt 128 lb  \par Current bra size: 34B, happy at this size\par Denies h/o VTE or MRSA infections\par Occ: Works at [employer]\par Here today with her daughter Sierra\par \par Interval hx (10/11/21): Pt presents today POD #12 s/p bilateral MRM and SLNB with immediate direct to implant reconstruction: left prepectoral, right subpectoral. Pt went to ED after discharge for increased left breast drain output and LLE swelling. Duplex negative. Now feeling better. Drain output: 1 (R IMF): /, 2 (R SL): , 3 (L SL): 10/7/7, 4 (L IMF): 10/5/5\par \par Interval hx (10/15/21):  Pt presents today POD #16 s/p bilateral MRM and SLNB with immediate direct to implant reconstruction: left prepectoral, right subpectoral. Taking PO abx as prescribed. C/o warmth to BL breasts but denies f/c or worsening pain or redness. Drain output: R- /5, L- 5//75\par \par Interval hx (10/25/21):  Here POD#26 s/p R MRM and SLNB with immediate direct to implant reconstruction; left pre-pectoral and right subpectoral.   left 15/10/8.  Denies fevers, chills, redness

## 2021-10-25 NOTE — ASSESSMENT
[FreeTextEntry1] : 72 y/o F with newly diagnosed right breast IDC/DCIS and +axillary LN who has elected to undergo bilateral mastectomy\par \par Now POD #26 s/p bilateral MRM and SLNB with immediate direct to implant reconstruction: left prepectoral, right subpectoral.\par \par -Left IMF drain removed\par -No heavy lifting\par -F/u with [facility] \par -Follow up in 1 week left breast seroma r/o\par \par \par \par Due to COVID-19, pre-visit patient instructions were explained to the patient and their symptoms were checked upon arrival. Masks were used by the healthcare provider and staff and the examination room was cleaned after the patient visit concluded\par

## 2021-10-27 ENCOUNTER — LABORATORY RESULT (OUTPATIENT)
Age: 71
End: 2021-10-27

## 2021-10-27 ENCOUNTER — APPOINTMENT (OUTPATIENT)
Dept: HEMATOLOGY ONCOLOGY | Facility: CLINIC | Age: 71
End: 2021-10-27
Payer: COMMERCIAL

## 2021-10-27 VITALS
BODY MASS INDEX: 25.32 KG/M2 | TEMPERATURE: 98.3 F | WEIGHT: 129 LBS | DIASTOLIC BLOOD PRESSURE: 73 MMHG | HEART RATE: 70 BPM | HEIGHT: 60 IN | SYSTOLIC BLOOD PRESSURE: 129 MMHG

## 2021-10-27 PROCEDURE — 99215 OFFICE O/P EST HI 40 MIN: CPT

## 2021-10-29 ENCOUNTER — NON-APPOINTMENT (OUTPATIENT)
Age: 71
End: 2021-10-29

## 2021-11-02 ENCOUNTER — APPOINTMENT (OUTPATIENT)
Dept: PLASTIC SURGERY | Facility: CLINIC | Age: 71
End: 2021-11-02
Payer: COMMERCIAL

## 2021-11-02 PROCEDURE — 99024 POSTOP FOLLOW-UP VISIT: CPT

## 2021-11-02 NOTE — HISTORY OF PRESENT ILLNESS
[FreeTextEntry1] : Pt is a 70 y/o post menopausal F, , with PMH of scoliosis, HTN, Cervical CA s/p hysterectomy, and newly diagnosed RIGHT breast cancer who presents for breast reconstruction consultation. Pt states she self-palpated a lesion in February and underwent mammogram/US followed by biopsy confirming IDC and DCIS as well as right axillary +LN. Had not had a mammogram since age 32. Denies nipple bleeding, discharge, or inversion. She has decided to pursue bilateral mastectomy.   Dr. Ferguson recommend [facility].\par \par Per Dr. Tejada, will need either adjuvant or neoadjuvant chemotherapy. Pt has elected to do adjuvant chemotherapy.\par Pt states she would like to least amount of surgery possible for her reconstruction.\par \par H/o right lumpectomy x2 : benign nodules\par +family h/o breast cancer: paternal aunt, dx in her 40s\par \par Ht 5' Wt 128 lb  \par Current bra size: 34B, happy at this size\par Denies h/o VTE or MRSA infections\par Occ: Works at [employer]\par Here today with her daughter Sierra\par \par Interval hx (10/11/21): Pt presents today POD #12 s/p bilateral MRM and SLNB with immediate direct to implant reconstruction: left prepectoral, right subpectoral. Pt went to ED after discharge for increased left breast drain output and LLE swelling. Duplex negative. Now feeling better. Drain output: 1 (R IMF): /, 2 (R SL): , 3 (L SL): 10/7/7, 4 (L IMF): 10/5/5\par \par Interval hx (10/15/21):  Pt presents today POD #16 s/p bilateral MRM and SLNB with immediate direct to implant reconstruction: left prepectoral, right subpectoral. Taking PO abx as prescribed. C/o warmth to BL breasts but denies f/c or worsening pain or redness. Drain output: R- /5, L- 5//75\par \par Interval hx (10/25/21):  Here POD#26 s/p R MRM and SLNB with immediate direct to implant reconstruction; left pre-pectoral and right subpectoral.   left 15/10/8.  Denies fevers, chills, redness\par \par Interval hx (21):  Patient presents today POD#34 s/p B/L MRM and SLNB with immediate direct to implant reconstruction; left pre-pectoral and right subpectoral. Pt is doing better and reporting improvement in breast discomfort and swelling. Taking Gabapentin and Tylenol. Denies any f/c or drainage from incisions.

## 2021-11-02 NOTE — ASSESSMENT
[FreeTextEntry1] : 70 y/o F with newly diagnosed right breast IDC/DCIS and +axillary LN who has elected to undergo bilateral mastectomy\par \par Now POD #34 s/p bilateral MRM and SLNB with immediate direct to implant reconstruction: left prepectoral, right subpectoral. Doing well. \par \par -Dressings changed\par -Patient reassured\par -Continue surgical bra with padding for compression \par -Continue current pain management\par -F/u with [facility] \par -Follow up in 2 weeks with Dr. Barrios\par \par \par Due to COVID-19, pre-visit patient instructions were explained to the patient and their symptoms were checked upon arrival. Masks were used by the healthcare provider and staff and the examination room was cleaned after the patient visit concluded\par

## 2021-11-02 NOTE — PHYSICAL EXAM
[de-identified] : well-appearing, NAD [de-identified] : nonlabored breathing [de-identified] : ENEDELIAR [de-identified] : Bilateral breasts implants soft and mobile, left appears larger than right due to implant position (left prepectoral, right subpectoral), incisions healing well, c/d/i, no erythema or evidence of tissue ischemia

## 2021-11-02 NOTE — DATA REVIEWED
[FreeTextEntry1] : Pathology             Final\par \par No Documents Attached\par \par \par   [facility] Accession Number : [id number]\par Patient:   JOSE FRANCISCO THOMAS\par \par \par Accession:                             [id number]\par \par Collected Date/Time:                   9/29/2021 09:11 EDT\par Received Date/Time:                    9/29/2021 09:32 EDT\par \par Addendum Report - Auth (Verified)\par \par Addendum\par \par SURGICAL PATHOLOGY CANCER CASE SUMMARY\par \par INVASIVE CARCINOMA OF THE BREAST\par \par PROCEDURE: Total mastectomy\par SPECIMEN LATERALITY: Right\par TUMOR SITE: INVASIVE CARCINOMA: Upper outer quadrant\par TUMOR SIZE: 37 mm\par HISTOLOGIC TYPE: Invasive carcinoma, tubulo-lobular variant\par Invasive micropapillary carcinoma\par HISTOLOGIC GRADE (Richard Histologic Score)\par GLANDULAR (ACINAR)/TUBULAR DIFFERENTIATION: Score 2\par NUCLEAR PLEOMORPHISM: Score 3\par MITOTIC RATE: Score 3\par OVERALL GRADE: Grade 3\par TUMOR FOCALITY: Multiple foci of invasive carcinoma\par Number of foci: 2\par Sizes of individual foci: 37 mm, 8 mm\par DUCTAL CARCINOMA IN SITU (DCIS): Present, negative for EIC\par ARCHITECTURAL PATTERNS: [place], cribriform, micropapillary\par NUCLEAR GRADE: Grade II (intermediate) to Grade III (high)\par \par NECROSIS: Present\par LOBULAR CARCINOMA IN SITU (LCIS): No LCIS in specimen\par MARGINS\par INVASIVE CARCINOMA: Uninvolved by invasive carcinoma\par DCIS: Uninvolved by DCIS\par REGIONAL LYMPH NODES: Involved by tumor cells\par Number of lymph nodes with macrometastases (>2 mm): 8\par Number of lymph nodes with micrometastases (>0.2 mm to 2 mm  and/or\par >200 cells): 0\par Number of lymph nodes with isolated tumor ells (   <0.2 mm and  <200\par cells): 0\par Extranodal extension: Present\par Number of lymph nodes examined: 12\par Number of sentinel nodes examined: 0\par TREATMENT EFFECT: No known presurgical therapy\par LYMPHOVASCULAR INVASION: Present\par DERMAL LYMPHOVASCULAR INVASION: Present\par PATHOLOGIC STAGING\par TNM Descriptors: m (multiple foci of invasive carcinoma\par PRIMARY TUMOR: pT2: Tumor >20 mm but    <50 mm in greatest dimension\par REGIONAL LYMPH NODES: pN2a: Metastases in 4 to 9 axillary lymph\par nodes\par ADDITIONAL PATHOLOGIC FINDINGS: Fibroadenoma\par MICROCALCIFICATIONS: Present in DCIS\par CLINICAL HISTORY: Radiologic finding: mass or architectural distortion\par BREAST BIOMARKER REPORTING: Pending\par \par Verified by: Zan Baker M.D.\par (Electronic Signature)\par Reported on: 10/06/21 12:52 EDT, [address], [place] [address]\par Phone: [phone]   Fax: [phone]\par _________________________________________________________________\par Surgical Pathology Report - Auth (Verified)\par \par Specimen(s) Submitted\par 1  Left breast\par 2  Left breast sentinel lymph node #1\par 3  Left breast sentinel lymph node #2\par 4  Right breast\par 5  Right axillary contents\par \par Final Diagnosis\par 1. Breast, left, simple skin-sparing mastectomy:\par - Atrophic predominantly fatty breast tissue with focal atypical lobular\par hyperplasia (ALH), a small hyalinized fibroadenoma, and proliferative\par type fibrocystic changes associated with microcalcifications.\par - Unremarkable nipple, skin, and deep fascial margin.\par \par \par 2. Breast, left axillary sentinel lymph node #1, biopsy:\par -  One benign lymph node (0/1).  Negative for carcinoma with evaluation\par of multiple H T E levels and with negative    immunohistochemical  stains\par for  cytokeratins (CK AE1/AE3 and CK 8/18).\par \par 3. Breast, left axillary sentinel lymph node #2. biopsy:\par -  One benign lymph node (0/1).  Negative for carcinoma with evaluation\par of multiple H T E levels and with negative    immunohistochemical  stains\par for  cytokeratins (CK AE1/AE3 and CK 8/18).\par \par 4. Breast, right, modified radical skin-sparing mastectomy:\par - Upper outer quadrant healing prior biopsy site changes with multifocal\par invasive poorly differentiated micropapillary carcinoma, 3.7 cm\par (radiographic measurement), and invasive well differentiated tubulo-\par lobular variant of ductal carcinoma, 0.8 cm (microscopic measurement);\par both associated with focal necrosis.\par - Non-extensive ductal carcinoma in-situ (DCIS), cribriform and\par micropapillary types with comedo necrosis and microcalcifications,\par intermediate to focally high nuclear grade.\par - Numerous foci of lymphovascular invasion are present.\par - The invasive tumor extends to focally involve (touches both ink and\par cautery) the blue inked surface.\par - The invasive tumor also extends to infiltrate an adjacent hyalinized\par fibroadenoma.\par - Atrophic predominantly fatty breast tissue with focal atypical lobular\par hyperplasia (ALH) and proliferative type fibrocystic changes.\par - Unremarkable nipple, skin, and deep fascial margin. Negative for\par carcinoma.\par -  Pending special studies for ER/IN proteins, proliferation index\par (Ki-67), and HER2/FREEMAN   oncoprotein expression and/or gene amplification,\par with results to be reported as a separate addendum.\par - AJCC 8th Edition Pathologic Stage, m(2)pT2, pN2a, pMx.\par \par 5. Breast, right axillary contents, excision:\par - Metastatic carcinoma present in eight out of twelve axillary lymph\par nodes (8/12) with extensive extracapsular extension seen.\par - The largest node contains a metallic "ring-shaped" biopsy clip and\par shows focal prior biopsy site changes.\par - Adjacent fibroadipose connective tissue containing axillary ectopic\par atrophic benign fatty breast tissue.\par \par Verified by: Zan Bkaer M.D.\par (Electronic Signature)\par Reported on: 10/05/21 16:17 EDT, [address] [place] [id number]\par Phone: [phone]   Fax: [phone]\par _________________________________________________________________\par \par Comment\par Case reported to Tumor Registry.\par \par \par Intraoperative Consultation\par The specimen is submitted for INTRAOPERATIVE CONSULTATION and the FROZEN\par SECTION diagnosis is reported at [address] [place] [address], , [place] [address] as:\par \par 2 One benign sentinel lymph node (0/1)\par \par 3. One benign sentinel lymph node (0/1)\par \par Received at: 9:33 am\par Verbally reported at: 10:09 am by: Dr. DILLON Baker to: Jocy (Nurse)\par \par Clinical Information\par Bilateral skin sparing mastectomy, right axillary lymph node dissection,\par left sentinel node biopsy\par \par Perioperative Diagnosis\par Right breast cancer\par \par \par Gross Description\par 1.  The specimen is received fresh, labeled "left breast suture\par marks  axillary tail" and consists of a mastectomy specimen measuring\par from superior to inferior- 17.7 cm, anterior to posterior- 6 cm, medial\par to lateral- 11.5 cm and weighing 190 gm.  Attached is an ellipse of\par skin measuring 7.2 x 3.4 x    0.3 cm with a raised nipple measuring 0.8\par cm in length and an areolar measuring 3 x 3 cm.  The skin is tan white\par and wrinkled with no lesions or masses grossly observed.  The specimen\par is oriented as follows: stitch marks the axillary tail.  The specimen\par is inked as follows: green - upper inner quadrant. blue - upper outer\par quadrant, orange - lower inner quadrant, yellow - lower outer quadrant,\par posterior - black.  The specimen is serially sectioned from medial\par to lateral.  There are no masses grossly identified. Cross sectioning\par reveals yellow slightly pink fibroadipose tissue.    Representative sections\par are submitted.\par \par Summary of Sections:\par 1A-1B - Nipple -2\par 1C-1D - Representative the upper outer quadrant -2\par 1E-1F - Representative the upper inner quadrant -2\par 1G-1H - Lower inner quadrant -2\par 1I-1J - Lower outer quadrant -2\par 1K-1L - Representative of the deep margin -2\par \par Total: 12 blocks\par \par 2.  The specimen is received fresh, labeled "left sentinel node #1" and\par consists of a fragment of   tan red lobulated  fibroadipose tissue measuring\par 2 x 1.3 x 0.8 cm. There is one lymph node identified measuring 0.9 x 0.6\par x 0.5 cm. Lymph node is bisected and entirely submitted.    (2 blocks)\par \par 3.  The specimen is received fresh, labeled "left sentinel lymph node\par #2" and consists of a fragment of reddish yellow    fibroadipose  tissue\par measuring 2 x 1.3 x 1 cm. There is one lymph node identified measuring\par 1.5 x 1 x 0.9 cm. Entire lymph node is serially sectioned.    The specimen\par is submitted entirely. (3 blocks)\par \par 4.  The specimen is received fresh, labeled "right breast stitch\par marks  axillary tail" and consists of a mastectomy specimen measuring\par superior to inferior - 8.3 cm, anterior to posterior - 3.5 cm, medial\par to lateral - 12 cm and weighing 235 gm.  There is an ellipse of skin\par measuring 6.5 x 3 x 0.3 cm. It is tan white and wrinkled.  The     areolar\par complex measures 2.5 x 2.5 cm.  The nipple raised measures 0.6 cm\par in length.  The specimen is oriented by sutures as follows; stitch\par marks  axillary tail.  The specimen is inked as follows: upper outer\par quadrant - blue, upper inner quadrant - green, lower outer quadrant -\par yellow, lower inner quadrant - orange, posterior - black.     The  specimen\par is serially sectioned from medial to lateral.  There are three masses\par that were found on sectioning.  The first mass is found in the upper\par outer quadrant measuring 2.5 x 1.4 x 1.8 cm  it is oval white firm 0.3\par cm from the deep margin.  The second mass is located in the upper outer\par \par quadrant and is tan white firm with   microcalcifications  measuring 2.5 x\par 1.5 x 1.5 cm.  It is 0.5 cm from the deep margin.     There is a third mass\par found at the upper outer quadrant it is white round firm and calcified\par measuring 2.3 x 1.6 x 1.2 cm, 0.3 cm from the deep margin.  The remainder\par of the specimen is yellow adipose tissue.  Representative sections are\par submitted.\par \par Summary of Sections:\par 4A-4B - Nipple and the areola complex -2\par 4C-4J - Tissue around first mass -8\par 4K-4R - Representative of the first mass -8\par 4S-4X - Representative of second mass -6\par 4Y-4AB - Representative of the third mass -4\par 4AC-4AD - Representative sections of the right upper inner quadrant -2\par 4AE-4EF - Representative sections of the right lower outer quadrant -2\par 4AG-4AH - Representative sections of the right upper outer quadrant -2\par 4AI- Representative sections of the right deep margin -1\par 4AJ-4AK - Representative sections of the right  lower inner quadrant -2\par \par Total: 38 blocks\par \par 5.  The specimen is received fresh, labeled "right   axillary contents"\par and consists of multiple fragments of   lobulated  fibroadipose  tissue,\par measuring 6.5 x 6.0 x 3.0 cm in aggregate and weighs 23 gm. There are\par multiple possible lymph nodes identified.    The specimen is submitted\par entirely.\par \par Summary of Sections:\par 5A - Three possible lymph nodes -1\par 5B - One lymph node serially sectioned -1\par 5C - One lymph node serially sectioned -1\par 5D - One lymph node serially sectioned -1\par 5E-5F - One lymph node serially sectioned -2\par 5G-5M - One lymph node serially sectioned (5I ring shaped clip) -7\par 5N-5W - Remaining fatty tissue -10\par \par Total blocks -  23\par \par Specimen was received and underwent gross examination at [facility], [address] [place] [address].\par \par 09/29/2021 11:30:51 EDT gm/lm\par \par  \par \par  Ordered by: DOCUMENT, SCM       Collected/Examined: 29Sep2021 09:11AM       \par Verification Not Required       Stage: Final       \par  Performed at: [facility]       Resulted: [id number] 12:52PM       Last Updated: 06Oct2021 01:52PM       Accession: [id number]

## 2021-11-06 LAB
ALBUMIN SERPL ELPH-MCNC: 4.3 G/DL
ALP BLD-CCNC: 74 U/L
ALT SERPL-CCNC: 11 U/L
ANION GAP SERPL CALC-SCNC: 13 MMOL/L
AST SERPL-CCNC: 17 U/L
BILIRUB DIRECT SERPL-MCNC: <0.2 MG/DL
BILIRUB INDIRECT SERPL-MCNC: >0.2 MG/DL
BILIRUB SERPL-MCNC: 0.4 MG/DL
BUN SERPL-MCNC: 15 MG/DL
CALCIUM SERPL-MCNC: 9.8 MG/DL
CANCER AG15-3 SERPL-ACNC: 25.4 U/ML
CEA SERPL-MCNC: 1.2 NG/ML
CHLORIDE SERPL-SCNC: 101 MMOL/L
CO2 SERPL-SCNC: 24 MMOL/L
CREAT SERPL-MCNC: 0.7 MG/DL
GLUCOSE SERPL-MCNC: 95 MG/DL
HCT VFR BLD CALC: 34.6 %
HGB BLD-MCNC: 11.7 G/DL
MCHC RBC-ENTMCNC: 30 PG
MCHC RBC-ENTMCNC: 33.8 G/DL
MCV RBC AUTO: 88.7 FL
PLATELET # BLD AUTO: 261 K/UL
PMV BLD: 9.8 FL
POTASSIUM SERPL-SCNC: 4.9 MMOL/L
PROT SERPL-MCNC: 6.8 G/DL
RBC # BLD: 3.9 M/UL
RBC # FLD: 12.7 %
SODIUM SERPL-SCNC: 138 MMOL/L
WBC # FLD AUTO: 9.15 K/UL

## 2021-11-06 NOTE — REASON FOR VISIT
[Follow-Up Visit] : a follow-up [Initial Consultation] : an initial consultation [FreeTextEntry2] : invasive ductal carcinoma

## 2021-11-06 NOTE — PHYSICAL EXAM
[Restricted in physically strenuous activity but ambulatory and able to carry out work of a light or sedentary nature] : Status 1- Restricted in physically strenuous activity but ambulatory and able to carry out work of a light or sedentary nature, e.g., light house work, office work [Normal] : grossly intact [de-identified] : There is 3 cm mass in RUOQ and a palpable right  axillary adenopathy. There is nor palapble abnormality in the left breast and left axilla.

## 2021-11-06 NOTE — CONSULT LETTER
[Dear  ___] : Dear  [unfilled], [Please see my note below.] : Please see my note below. [Sincerely,] : Sincerely, [DrPatrick  ___] : Dr. EAST [Courtesy Letter:] : I had the pleasure of seeing your patient, [unfilled], in my office today. [DrPatrick ___] : Dr. EAST [FreeTextEntry3] : Keyla Tejada MD

## 2021-11-06 NOTE — HISTORY OF PRESENT ILLNESS
[de-identified] : JOSE FRANCISCO THOMAS is a 71 year old female PMHx of cervical cancer s/p hysterectomy and unilateral salpingo-oophorectomy,  HTN presents for newly diagnosed invasive breast cancer. \par \par Patient first palpated a mass in the RUOQ herself within the past year. She had pain at the site and feels that it is growing. She went for b/l diagnostic mammo and sonogram on 6/17/2021. Her left breast had no suspicious findings however on her right breast a spiculated mass in the RUOQ as well as a second mass superior to with a thickened axillary 2.2 cm axillary node was seen. \par -breasts are heterogeneously dense\par -R: UOQ, spiculated mass, with calcifications, c/w US finding below \par -R: Medial to this mass in the posterior depth, an area of architectural distortion. \par -R: Slightly superior to this mass, there is a an additional circumscribed mass also seen on concurrent ultrasound.\par Right breast:\par - In the axilla, at the 10 to 11:00 position 12 cm from the nipple, there is a cortically thickened axillary lymph node measuring 2.2 cm --> BIOPSY \par -Corresponding to the area palpable concern at the 10 to 11:00 position 5 to 8 cm from the nipple, there is a spiculated mass with associated coarse calcifications measuring 2.7 x 1.2 x 1.8 cm --> BIOPSY \par -At the 11:00 position 10 cm from the nipple, there is a circumscribed hypoechoic mass measuring 1.3 x 1.2 x 0.4 cm, corresponding to mammographic findings --> BIOPSY \par -At the 6:00 position 5 cm from the nipple, there is a circumscribed hypoechoic mass measuring 0.3 x 0.3 x 0.2 cm.\par -At the 6:00 position 4 cm from the nipple, there is a circumscribed hypoechoic mass measuring 0.6 x 0.6 x 0.2 cm --> BIOPSY \par BI-RADS Category 5: Highly Suggestive of Malignancy\par \par On 7/21/2021, she went for US guided biopsy of  the 2cm mass which showed invasive moderately differential ductal carcinoma, with dominant micrpapillary features\par 1. Breast, right 10-11 N5-8 mass, ultrasound guided needle core\par biopsies:\par - Invasive moderately differentiated ductal carcinoma with dominant micropapillary features, ER 99%, SD 15%. Ki67 20%, Her 2 negative (IHC 1+, Her2/CEP17 1.9)\par - Ductal carcinoma in-situ (DCIS), cribriform type with comedo\par necrosis and microcalcifications, intermediate nuclear grade.\par - Foci of lymphovascular invasion are present.\par \par 2. Breast, right 11 N10 mass, ultrasound guided needle core biopsies:\par - Benign atrophic fatty breast tissue with a dominant macrocyst wall fragment demonstrating an attenuated epithelial lining; consistent with a dilated duct.\par \par 3. Breast, right 6 N4 mass, ultrasound guided needle core biopsies:\par - Hyalinized fibroadenoma.\par \par 4. Breast, right axillary mass, ultrasound guided needle core biopsies:\par - Lymph node fragments containing metastatic carcinoma (1/1), the largest contiguous focus of which measures 6.0 mm (microscopic measurement).\par - No extracapsular extension is identified in this biopsy material.\par \par On 8/16/21, she had a PET/CT which showed pathologic FDG uptake (SUV 7) coregistering with 3.6 x 2 cm right breast mass and pathologic FDG uptake in 2 cm right axillary nodule (SUV 6.1) consistent with documented biologic tumor activity. No other sites of abnormal FDG uptake\par \par On 8/18/21, she had b/l breast MRI MRI which showed the biopsy proven right breast carcinoma 1.9 x 3.7 x 3.5 cm and 2 cm right axillary adenopathy. No additional suspicious enhancement in either breast. \par \par She saw Dr. Fegruson for surgical consultation. She was given options to have neoadjuvant chemo followed by surgery or immediate surgery. \par \par Her family history is significant for breast cancer in her paternal aunt in her 40s,  2 uncles who had "brain cancer"; and her mom who had cervical cancer. \par  [de-identified] : 10/27/21:\par Frida is here today for followup visit. She was previously seen on 8/20/21. She was diagnosed with  IDC of the right breast with 3 cm mass and positive right axillary lymph node, ER/IL positive, Her-2 negative. Clinical stage is T3N1 with Oncotype RS 27. She was given an option to have neoadjuvant chemotherapy prior to surgery but she opted to have surgery first. On 9/29/21, she underwent right breast modified radical skin-sparing mastectomy, right ALND, left breast simple skin-sparing mastectomy and left axillary SLNB with immediate direct to implant reconstruction. \par - The right mastectomy revealed 3.7 cm invasive poorly differentiated micropapillary carcinoma, ER 85%, IL 15%, Her-2 negative (FISH ration 1.8), Ki 67 20-25% and 0.8 cm invasive well differentiated tubulo-lobular variant of ductal carcinoma, %, IL 90%, Her-2 negative and Ki 67 3-5%,  Non-extensive ductal carcinoma in-situ (DCIS), cribriform and micropapillary types with comedo necrosis and microcalcifications, intermediate to focally high nuclear grade. Numerous foci of lymphovascular invasion are present. The invasive tumor extends to focally involve (touches both ink and cautery) the blue inked surface. The nipple, skin, and deep fascial margin are negative for carcinoma. Right ALND reveal metastatic carcinoma present in eight out of twelve axillary lymph nodes (8/12) with extensive extracapsular extension seen. The largest node contains a metallic "ring-shaped" biopsy clip and shows focal prior biopsy site changes.\par AJCC 8th Edition Pathologic Stage, m(2)pT2, pN2a, pMx.\par - The left breast simple skin-sparing mastectomy show atrophic predominantly fatty breast tissue with focal atypical lobular hyperplasia (ALH). 2 sentinel lymph noses are negative for malignancy.  \par She recovered well from surgery and is here today to discuss adjuvant systemic therapy. \par \par

## 2021-11-06 NOTE — ASSESSMENT
[FreeTextEntry1] : 72 yo female has stage IIIA ([id number]) right breast multifocal invasive poorly differentiated micropapillary carcinoma, 3.7 cm and invasive well differentiated tubulo-lobular variant of ductal carcinoma, 0.8 cm, ER/NV positive, Her-2 negative, s/p right breast modified radical skin-sparing mastectomy, right ALND, left breast simple skin-sparing mastectomy and left axillary SLNB with immediate direct to implant reconstruction. 8/12 right axillary lymph nodes are positive for metastatic carcinoma with extensive extracapsular extension.\par Oncotype RS is 27. \par \par The left breast has atypical lobular hyperplasia. \par \par Recommendations:\par -- Reviewed the pathology report with the patient. She underwent right breast modified radical skin-sparing mastectomy, right ALND, left breast simple skin-sparing mastectomy and left axillary SLNB. The pathology showed two foci of cancers in the right breast; 3.7 cm invasive poorly differentiated micropapillary carcinoma and 0.8 cm invasive well differentiated tubulo-lobular variant of ductal carcinoma. Both tumors are ER/NV positive, Her-2 negative. 8/12 right axillary lymph nodes are positive for metastatic carcinoma with extensive extracapsular extension. Invasive tumor focally involves a blue inked surface. \par -- Staging PET/CT on 8/16/21 was negative for distant mets. \par --In light of multiple positive axillary lymph nodes and Oncotype RS 27, she is indicated for adjuvant chemotherapy. Dose-dense AC every 2 weeks followed by Taxol weekly x 12 is recommended. We discussed chemotherapy related side effects which may include but not limit to bone marrow suppression, cytopenia, increase risk of infection, nausea, vomiting, diarrhea, fatigue, alopecia, infusional reaction, and neuropathy. Adriamycin may cause cardiomyopathy and decreased heart function. It has a small risk for developing leukemia and bone marrow disorder. The benefit and side effects ratio was weighed. She agrees with chemotherapy. \par -- Regarding to positive margin, Dr. Ferguson discussed with her and plan to have reexcision after completion of adjuvant chemotherapy. \par -- Discussed to have port placement for chemo. Will refer her to IR.  \par -- Need to have 2D Echo or MUGA to evaluate LVEF.\par -- Order blood work: CBC, BMP, LFT, CEA, . \par -- We also discussed adjuvant endocrine therapy. In light of hormone receptor positive breast cancer, she will benefit from adjuvant endocrine therapy with an aromatase inhibitor. Letrozole is recommended. She will start endocrine therapy after chemotherapy. The benefit ans side effects were reviewed briefly. Will discuss with her in more detail.\par \par All questions were answered. We anticipate she can start chemo in 3-4 weeks. \par

## 2021-11-09 ENCOUNTER — OUTPATIENT (OUTPATIENT)
Dept: OUTPATIENT SERVICES | Facility: HOSPITAL | Age: 71
LOS: 1 days | Discharge: HOME | End: 2021-11-09

## 2021-11-09 VITALS
DIASTOLIC BLOOD PRESSURE: 76 MMHG | HEART RATE: 76 BPM | RESPIRATION RATE: 16 BRPM | HEIGHT: 60 IN | OXYGEN SATURATION: 100 % | SYSTOLIC BLOOD PRESSURE: 130 MMHG | TEMPERATURE: 98 F | WEIGHT: 126.1 LBS

## 2021-11-09 DIAGNOSIS — C50.411 MALIGNANT NEOPLASM OF UPPER-OUTER QUADRANT OF RIGHT FEMALE BREAST: ICD-10-CM

## 2021-11-09 DIAGNOSIS — Z01.818 ENCOUNTER FOR OTHER PREPROCEDURAL EXAMINATION: ICD-10-CM

## 2021-11-09 DIAGNOSIS — Z90.13 ACQUIRED ABSENCE OF BILATERAL BREASTS AND NIPPLES: Chronic | ICD-10-CM

## 2021-11-09 DIAGNOSIS — Z90.49 ACQUIRED ABSENCE OF OTHER SPECIFIED PARTS OF DIGESTIVE TRACT: Chronic | ICD-10-CM

## 2021-11-09 DIAGNOSIS — Z90.710 ACQUIRED ABSENCE OF BOTH CERVIX AND UTERUS: Chronic | ICD-10-CM

## 2021-11-09 LAB
ALBUMIN SERPL ELPH-MCNC: 4.3 G/DL — SIGNIFICANT CHANGE UP (ref 3.5–5.2)
ALP SERPL-CCNC: 65 U/L — SIGNIFICANT CHANGE UP (ref 30–115)
ALT FLD-CCNC: 9 U/L — SIGNIFICANT CHANGE UP (ref 0–41)
ANION GAP SERPL CALC-SCNC: 15 MMOL/L — HIGH (ref 7–14)
APTT BLD: 32.3 SEC — SIGNIFICANT CHANGE UP (ref 27–39.2)
AST SERPL-CCNC: 14 U/L — SIGNIFICANT CHANGE UP (ref 0–41)
BASOPHILS # BLD AUTO: 0.02 K/UL — SIGNIFICANT CHANGE UP (ref 0–0.2)
BASOPHILS NFR BLD AUTO: 0.3 % — SIGNIFICANT CHANGE UP (ref 0–1)
BILIRUB SERPL-MCNC: 0.3 MG/DL — SIGNIFICANT CHANGE UP (ref 0.2–1.2)
BUN SERPL-MCNC: 22 MG/DL — HIGH (ref 10–20)
CALCIUM SERPL-MCNC: 9.7 MG/DL — SIGNIFICANT CHANGE UP (ref 8.5–10.1)
CHLORIDE SERPL-SCNC: 104 MMOL/L — SIGNIFICANT CHANGE UP (ref 98–110)
CO2 SERPL-SCNC: 25 MMOL/L — SIGNIFICANT CHANGE UP (ref 17–32)
CREAT SERPL-MCNC: 0.8 MG/DL — SIGNIFICANT CHANGE UP (ref 0.7–1.5)
EOSINOPHIL # BLD AUTO: 0.06 K/UL — SIGNIFICANT CHANGE UP (ref 0–0.7)
EOSINOPHIL NFR BLD AUTO: 0.9 % — SIGNIFICANT CHANGE UP (ref 0–8)
GLUCOSE SERPL-MCNC: 84 MG/DL — SIGNIFICANT CHANGE UP (ref 70–99)
HCT VFR BLD CALC: 33.6 % — LOW (ref 37–47)
HGB BLD-MCNC: 10.9 G/DL — LOW (ref 12–16)
IMM GRANULOCYTES NFR BLD AUTO: 0.3 % — SIGNIFICANT CHANGE UP (ref 0.1–0.3)
INR BLD: 1.02 RATIO — SIGNIFICANT CHANGE UP (ref 0.65–1.3)
LYMPHOCYTES # BLD AUTO: 1.65 K/UL — SIGNIFICANT CHANGE UP (ref 1.2–3.4)
LYMPHOCYTES # BLD AUTO: 25.3 % — SIGNIFICANT CHANGE UP (ref 20.5–51.1)
MCHC RBC-ENTMCNC: 29.1 PG — SIGNIFICANT CHANGE UP (ref 27–31)
MCHC RBC-ENTMCNC: 32.4 G/DL — SIGNIFICANT CHANGE UP (ref 32–37)
MCV RBC AUTO: 89.6 FL — SIGNIFICANT CHANGE UP (ref 81–99)
MONOCYTES # BLD AUTO: 0.56 K/UL — SIGNIFICANT CHANGE UP (ref 0.1–0.6)
MONOCYTES NFR BLD AUTO: 8.6 % — SIGNIFICANT CHANGE UP (ref 1.7–9.3)
NEUTROPHILS # BLD AUTO: 4.22 K/UL — SIGNIFICANT CHANGE UP (ref 1.4–6.5)
NEUTROPHILS NFR BLD AUTO: 64.6 % — SIGNIFICANT CHANGE UP (ref 42.2–75.2)
NRBC # BLD: 0 /100 WBCS — SIGNIFICANT CHANGE UP (ref 0–0)
PLATELET # BLD AUTO: 385 K/UL — SIGNIFICANT CHANGE UP (ref 130–400)
POTASSIUM SERPL-MCNC: 5.1 MMOL/L — HIGH (ref 3.5–5)
POTASSIUM SERPL-SCNC: 5.1 MMOL/L — HIGH (ref 3.5–5)
PROT SERPL-MCNC: 6.9 G/DL — SIGNIFICANT CHANGE UP (ref 6–8)
PROTHROM AB SERPL-ACNC: 11.7 SEC — SIGNIFICANT CHANGE UP (ref 9.95–12.87)
RBC # BLD: 3.75 M/UL — LOW (ref 4.2–5.4)
RBC # FLD: 12.4 % — SIGNIFICANT CHANGE UP (ref 11.5–14.5)
SODIUM SERPL-SCNC: 144 MMOL/L — SIGNIFICANT CHANGE UP (ref 135–146)
WBC # BLD: 6.53 K/UL — SIGNIFICANT CHANGE UP (ref 4.8–10.8)
WBC # FLD AUTO: 6.53 K/UL — SIGNIFICANT CHANGE UP (ref 4.8–10.8)

## 2021-11-09 RX ORDER — CELECOXIB 200 MG/1
0 CAPSULE ORAL
Qty: 0 | Refills: 0 | DISCHARGE

## 2021-11-09 RX ORDER — ACETAMINOPHEN 500 MG
0 TABLET ORAL
Qty: 0 | Refills: 0 | DISCHARGE

## 2021-11-09 NOTE — H&P PST ADULT - NSICDXPASTSURGICALHX_GEN_ALL_CORE_FT
PAST SURGICAL HISTORY:  H/O abdominal hysterectomy     History of appendectomy     S/P bilateral mastectomy 9/21 lymph node dissection and placement of implants

## 2021-11-09 NOTE — H&P PST ADULT - NSICDXPASTMEDICALHX_GEN_ALL_CORE_FT
PAST MEDICAL HISTORY:  Breast cancer RIGHT BREAST CA-  s/p b/l mastecotmy lnd   9/21    Cervical cancer 1988. S/P RADICAL HYSTERECTOMY    HTN (hypertension)

## 2021-11-09 NOTE — H&P PST ADULT - HISTORY OF PRESENT ILLNESS
71 yr old female to past for  port placement in IR Dr Roth on 11/18/21  Pt is 7 wks s/p bilateral mastectomy implants lymph node dissection right side for CA stg III at [facility]  per pt for port to start chemo for 6 mos and then Rad tx to follow states tolerated sx well feels stable and ready for this procedure  Pt reports no cardiopulmonary issues denies sob/groves/cp/palpitations. Pt states no recent infections no fever no cough no uti uri. Stated exercise tolerance is  2-3   flights no changes. Francisco Javier screen revd.  Pt denies any s/s covid 19 and reports no contact with known positive people. Pt has appointment for repeat covid testing pre op and instructed to continue to self monitor and report any concerns to MD. Pt will continue to practice self isolation and  exposure control measures pre op  s/p moderna vac x 2  3/21  Anesthesia Alert  NO--Difficult Airway  NO--History of neck surgery or radiation  NO--Limited ROM of neck  NO--History of Malignant hyperthermia  NO--No personal or family history of Pseudocholinesterase deficiency.  NO--Prior Anesthesia Complication  NO--Latex Allergy  NO--Loose teeth  NO--History of Rheumatoid Arthritis  NO--FRANCISCO JAVIER  NO--Other_____  no bleeding issues  C50.411 [id number]    Malignant neoplasm of upper-outer quadrant of right female breast    Encounter for other preprocedural examination    ^C50.411 [id number]    FH: HTN (hypertension) (Father)    Family history of cervical cancer (Mother)    Family history of diabetes mellitus (DM) (Mother)    FH: Alzheimers disease (Mother)    Malignant neoplasm of upper-outer quadrant of right female breast    Encounter for other preprocedural examination    HTN (hypertension)    Breast cancer    Cervical cancer    History of appendectomy    H/O abdominal hysterectomy

## 2021-11-15 ENCOUNTER — LABORATORY RESULT (OUTPATIENT)
Age: 71
End: 2021-11-15

## 2021-11-15 ENCOUNTER — APPOINTMENT (OUTPATIENT)
Dept: PLASTIC SURGERY | Facility: CLINIC | Age: 71
End: 2021-11-15
Payer: COMMERCIAL

## 2021-11-15 ENCOUNTER — OUTPATIENT (OUTPATIENT)
Dept: OUTPATIENT SERVICES | Facility: HOSPITAL | Age: 71
LOS: 1 days | Discharge: HOME | End: 2021-11-15

## 2021-11-15 DIAGNOSIS — Z11.59 ENCOUNTER FOR SCREENING FOR OTHER VIRAL DISEASES: ICD-10-CM

## 2021-11-15 DIAGNOSIS — Z90.13 ACQUIRED ABSENCE OF BILATERAL BREASTS AND NIPPLES: Chronic | ICD-10-CM

## 2021-11-15 DIAGNOSIS — Z90.49 ACQUIRED ABSENCE OF OTHER SPECIFIED PARTS OF DIGESTIVE TRACT: Chronic | ICD-10-CM

## 2021-11-15 DIAGNOSIS — Z90.710 ACQUIRED ABSENCE OF BOTH CERVIX AND UTERUS: Chronic | ICD-10-CM

## 2021-11-15 DIAGNOSIS — M25.612 STIFFNESS OF RIGHT SHOULDER, NOT ELSEWHERE CLASSIFIED: ICD-10-CM

## 2021-11-15 DIAGNOSIS — M25.611 STIFFNESS OF RIGHT SHOULDER, NOT ELSEWHERE CLASSIFIED: ICD-10-CM

## 2021-11-15 PROBLEM — C50.919 MALIGNANT NEOPLASM OF UNSPECIFIED SITE OF UNSPECIFIED FEMALE BREAST: Chronic | Status: ACTIVE | Noted: 2021-09-15

## 2021-11-15 PROCEDURE — 99024 POSTOP FOLLOW-UP VISIT: CPT

## 2021-11-15 NOTE — PHYSICAL EXAM
[de-identified] : well-appearing, NAD [de-identified] : nonlabored breathing [de-identified] : ENEDELIAR [de-identified] : Bilateral breasts implants soft and mobile, left appears larger than right due to implant position (left prepectoral, right subpectoral), incisions healing well, c/d/i, no erythema or evidence of tissue ischemia

## 2021-11-15 NOTE — DATA REVIEWED
[FreeTextEntry1] : Pathology             Final\par \par No Documents Attached\par \par \par   [facility] Accession Number : [id number]\par Patient:   JOSE FRANCISCO THOMAS\par \par \par Accession:                             [id number]\par \par Collected Date/Time:                   9/29/2021 09:11 EDT\par Received Date/Time:                    9/29/2021 09:32 EDT\par \par Addendum Report - Auth (Verified)\par \par Addendum\par \par SURGICAL PATHOLOGY CANCER CASE SUMMARY\par \par INVASIVE CARCINOMA OF THE BREAST\par \par PROCEDURE: Total mastectomy\par SPECIMEN LATERALITY: Right\par TUMOR SITE: INVASIVE CARCINOMA: Upper outer quadrant\par TUMOR SIZE: 37 mm\par HISTOLOGIC TYPE: Invasive carcinoma, tubulo-lobular variant\par Invasive micropapillary carcinoma\par HISTOLOGIC GRADE (Richard Histologic Score)\par GLANDULAR (ACINAR)/TUBULAR DIFFERENTIATION: Score 2\par NUCLEAR PLEOMORPHISM: Score 3\par MITOTIC RATE: Score 3\par OVERALL GRADE: Grade 3\par TUMOR FOCALITY: Multiple foci of invasive carcinoma\par Number of foci: 2\par Sizes of individual foci: 37 mm, 8 mm\par DUCTAL CARCINOMA IN SITU (DCIS): Present, negative for EIC\par ARCHITECTURAL PATTERNS: Comedo, cribriform, micropapillary\par NUCLEAR GRADE: Grade II (intermediate) to Grade III (high)\par \par NECROSIS: Present\par LOBULAR CARCINOMA IN SITU (LCIS): No LCIS in specimen\par MARGINS\par INVASIVE CARCINOMA: Uninvolved by invasive carcinoma\par DCIS: Uninvolved by DCIS\par REGIONAL LYMPH NODES: Involved by tumor cells\par Number of lymph nodes with macrometastases (>2 mm): 8\par Number of lymph nodes with micrometastases (>0.2 mm to 2 mm  and/or\par >200 cells): 0\par Number of lymph nodes with isolated tumor ells (   <0.2 mm and  <200\par cells): 0\par Extranodal extension: Present\par Number of lymph nodes examined: 12\par Number of sentinel nodes examined: 0\par TREATMENT EFFECT: No known presurgical therapy\par LYMPHOVASCULAR INVASION: Present\par DERMAL LYMPHOVASCULAR INVASION: Present\par PATHOLOGIC STAGING\par TNM Descriptors: m (multiple foci of invasive carcinoma\par PRIMARY TUMOR: pT2: Tumor >20 mm but    <50 mm in greatest dimension\par REGIONAL LYMPH NODES: pN2a: Metastases in 4 to 9 axillary lymph\par nodes\par ADDITIONAL PATHOLOGIC FINDINGS: Fibroadenoma\par MICROCALCIFICATIONS: Present in DCIS\par CLINICAL HISTORY: Radiologic finding: mass or architectural distortion\par BREAST BIOMARKER REPORTING: Pending\par \par Verified by: Zan Baker M.D.\par (Electronic Signature)\par Reported on: 10/06/21 12:52 EDT, [address], [place] [address]\par Phone: [phone]   Fax: [phone]\par _________________________________________________________________\par Surgical Pathology Report - Auth (Verified)\par \par Specimen(s) Submitted\par 1  Left breast\par 2  Left breast sentinel lymph node #1\par 3  Left breast sentinel lymph node #2\par 4  Right breast\par 5  Right axillary contents\par \par Final Diagnosis\par 1. Breast, left, simple skin-sparing mastectomy:\par - Atrophic predominantly fatty breast tissue with focal atypical lobular\par hyperplasia (ALH), a small hyalinized fibroadenoma, and proliferative\par type fibrocystic changes associated with microcalcifications.\par - Unremarkable nipple, skin, and deep fascial margin.\par \par \par 2. Breast, left axillary sentinel lymph node #1, biopsy:\par -  One benign lymph node (0/1).  Negative for carcinoma with evaluation\par of multiple H T E levels and with negative    immunohistochemical  stains\par for  cytokeratins (CK AE1/AE3 and CK 8/18).\par \par 3. Breast, left axillary sentinel lymph node #2. biopsy:\par -  One benign lymph node (0/1).  Negative for carcinoma with evaluation\par of multiple H T E levels and with negative    immunohistochemical  stains\par for  cytokeratins (CK AE1/AE3 and CK 8/18).\par \par 4. Breast, right, modified radical skin-sparing mastectomy:\par - Upper outer quadrant healing prior biopsy site changes with multifocal\par invasive poorly differentiated micropapillary carcinoma, 3.7 cm\par (radiographic measurement), and invasive well differentiated tubulo-\par lobular variant of ductal carcinoma, 0.8 cm (microscopic measurement);\par both associated with focal necrosis.\par - Non-extensive ductal carcinoma in-situ (DCIS), cribriform and\par micropapillary types with comedo necrosis and microcalcifications,\par intermediate to focally high nuclear grade.\par - Numerous foci of lymphovascular invasion are present.\par - The invasive tumor extends to focally involve (touches both ink and\par cautery) the blue inked surface.\par - The invasive tumor also extends to infiltrate an adjacent hyalinized\par fibroadenoma.\par - Atrophic predominantly fatty breast tissue with focal atypical lobular\par hyperplasia (ALH) and proliferative type fibrocystic changes.\par - Unremarkable nipple, skin, and deep fascial margin. Negative for\par carcinoma.\par -  Pending special studies for ER/SD proteins, proliferation index\par (Ki-67), and HER2/FREEMAN   oncoprotein expression and/or gene amplification,\par with results to be reported as a separate addendum.\par - AJCC 8th Edition Pathologic Stage, m(2)pT2, pN2a, pMx.\par \par 5. Breast, right axillary contents, excision:\par - Metastatic carcinoma present in eight out of twelve axillary lymph\par nodes (8/12) with extensive extracapsular extension seen.\par - The largest node contains a metallic "ring-shaped" biopsy clip and\par shows focal prior biopsy site changes.\par - Adjacent fibroadipose connective tissue containing axillary ectopic\par atrophic benign fatty breast tissue.\par \par Verified by: Zan Baker M.D.\par (Electronic Signature)\par Reported on: 10/05/21 16:17 EDT, [address] [place] [id number]\par Phone: [phone]   Fax: [phone]\par _________________________________________________________________\par \par Comment\par Case reported to Tumor Registry.\par \par \par Intraoperative Consultation\par The specimen is submitted for INTRAOPERATIVE CONSULTATION and the FROZEN\par SECTION diagnosis is reported at [address] [place] [address], , [place] [address] as:\par \par 2 One benign sentinel lymph node (0/1)\par \par 3. One benign sentinel lymph node (0/1)\par \par Received at: 9:33 am\par Verbally reported at: 10:09 am by: Dr. DILLON Baker to: Jocy (Nurse)\par \par Clinical Information\par Bilateral skin sparing mastectomy, right axillary lymph node dissection,\par left sentinel node biopsy\par \par Perioperative Diagnosis\par Right breast cancer\par \par \par Gross Description\par 1.  The specimen is received fresh, labeled "left breast suture\par marks  axillary tail" and consists of a mastectomy specimen measuring\par from superior to inferior- 17.7 cm, anterior to posterior- 6 cm, medial\par to lateral- 11.5 cm and weighing 190 gm.  Attached is an ellipse of\par skin measuring 7.2 x 3.4 x    0.3 cm with a raised nipple measuring 0.8\par cm in length and an areolar measuring 3 x 3 cm.  The skin is tan white\par and wrinkled with no lesions or masses grossly observed.  The specimen\par is oriented as follows: stitch marks the axillary tail.  The specimen\par is inked as follows: green - upper inner quadrant. blue - upper outer\par quadrant, orange - lower inner quadrant, yellow - lower outer quadrant,\par posterior - black.  The specimen is serially sectioned from medial\par to lateral.  There are no masses grossly identified. Cross sectioning\par reveals yellow slightly pink fibroadipose tissue.    Representative sections\par are submitted.\par \par Summary of Sections:\par 1A-1B - Nipple -2\par 1C-1D - Representative the upper outer quadrant -2\par 1E-1F - Representative the upper inner quadrant -2\par 1G-1H - Lower inner quadrant -2\par 1I-1J - Lower outer quadrant -2\par 1K-1L - Representative of the deep margin -2\par \par Total: 12 blocks\par \par 2.  The specimen is received fresh, labeled "left sentinel node #1" and\par consists of a fragment of   tan red lobulated  fibroadipose tissue measuring\par 2 x 1.3 x 0.8 cm. There is one lymph node identified measuring 0.9 x 0.6\par x 0.5 cm. Lymph node is bisected and entirely submitted.    (2 blocks)\par \par 3.  The specimen is received fresh, labeled "left sentinel lymph node\par #2" and consists of a fragment of reddish yellow    fibroadipose  tissue\par measuring 2 x 1.3 x 1 cm. There is one lymph node identified measuring\par 1.5 x 1 x 0.9 cm. Entire lymph node is serially sectioned.    The specimen\par is submitted entirely. (3 blocks)\par \par 4.  The specimen is received fresh, labeled "right breast stitch\par marks  axillary tail" and consists of a mastectomy specimen measuring\par superior to inferior - 8.3 cm, anterior to posterior - 3.5 cm, medial\par to lateral - 12 cm and weighing 235 gm.  There is an ellipse of skin\par measuring 6.5 x 3 x 0.3 cm. It is tan white and wrinkled.  The     areolar\par complex measures 2.5 x 2.5 cm.  The nipple raised measures 0.6 cm\par in length.  The specimen is oriented by sutures as follows; stitch\par marks  axillary tail.  The specimen is inked as follows: upper outer\par quadrant - blue, upper inner quadrant - green, lower outer quadrant -\par yellow, lower inner quadrant - orange, posterior - black.     The  specimen\par is serially sectioned from medial to lateral.  There are three masses\par that were found on sectioning.  The first mass is found in the upper\par outer quadrant measuring 2.5 x 1.4 x 1.8 cm  it is oval white firm 0.3\par cm from the deep margin.  The second mass is located in the upper outer\par \par quadrant and is tan white firm with   microcalcifications  measuring 2.5 x\par 1.5 x 1.5 cm.  It is 0.5 cm from the deep margin.     There is a third mass\par found at the upper outer quadrant it is white round firm and calcified\par measuring 2.3 x 1.6 x 1.2 cm, 0.3 cm from the deep margin.  The remainder\par of the specimen is yellow adipose tissue.  Representative sections are\par submitted.\par \par Summary of Sections:\par 4A-4B - Nipple and the areola complex -2\par 4C-4J - Tissue around first mass -8\par 4K-4R - Representative of the first mass -8\par 4S-4X - Representative of second mass -6\par 4Y-4AB - Representative of the third mass -4\par 4AC-4AD - Representative sections of the right upper inner quadrant -2\par 4AE-4EF - Representative sections of the right lower outer quadrant -2\par 4AG-4AH - Representative sections of the right upper outer quadrant -2\par 4AI- Representative sections of the right deep margin -1\par 4AJ-4AK - Representative sections of the right  lower inner quadrant -2\par \par Total: 38 blocks\par \par 5.  The specimen is received fresh, labeled "right   axillary contents"\par and consists of multiple fragments of   lobulated  fibroadipose  tissue,\par measuring 6.5 x 6.0 x 3.0 cm in aggregate and weighs 23 gm. There are\par multiple possible lymph nodes identified.    The specimen is submitted\par entirely.\par \par Summary of Sections:\par 5A - Three possible lymph nodes -1\par 5B - One lymph node serially sectioned -1\par 5C - One lymph node serially sectioned -1\par 5D - One lymph node serially sectioned -1\par 5E-5F - One lymph node serially sectioned -2\par 5G-5M - One lymph node serially sectioned (5I ring shaped clip) -7\par 5N-5W - Remaining fatty tissue -10\par \par Total blocks -  23\par \par Specimen was received and underwent gross examination at [facility], [address] [place] [address].\par \par 09/29/2021 11:30:51 EDT gm/lm\par \par  \par \par  Ordered by: DOCUMENT, SCM       Collected/Examined: 29Sep2021 09:11AM       \par Verification Not Required       Stage: Final       \par  Performed at: [facility]       Resulted: [id number] 12:52PM       Last Updated: 06Oct2021 01:52PM       Accession: [id number]

## 2021-11-15 NOTE — ASSESSMENT
[FreeTextEntry1] : 72 y/o F with newly diagnosed right breast IDC/DCIS and +axillary LN who has elected to undergo bilateral mastectomy\par \par 7 weeks s/p bilateral MRM and SLNB with immediate direct to implant reconstruction: left prepectoral, right subpectoral. Post-op hyperesthetic pain vs neuropathic pain\par \par -Patient reassured; paresthesia associated with return of left mastectomy flap sensation\par -Start biofeedback BL mastectomy flap massage; demonstrated with patient\par -Rx given for right bra prosthetic for breast asymmetry. reviewed secondary procedures after chemo and XRT to address likely XRT-related changes and asymmetry in the future.\par -OT referral for BUE ROM\par -Pain mgmt reviewed; will resume gabapentin 200 TID x 1 month and re-evaluate\par -F/u with [facility]\par -Follow up in 6 weeks\par \par \par Due to COVID-19, pre-visit patient instructions were explained to the patient and their symptoms were checked upon arrival. Masks were used by the healthcare provider and staff and the examination room was cleaned after the patient visit concluded\par

## 2021-11-15 NOTE — HISTORY OF PRESENT ILLNESS
[FreeTextEntry1] : Pt is a 70 y/o post menopausal F, , with PMH of scoliosis, HTN, Cervical CA s/p hysterectomy, and newly diagnosed RIGHT breast cancer who presents for breast reconstruction consultation. Pt states she self-palpated a lesion in February and underwent mammogram/US followed by biopsy confirming IDC and DCIS as well as right axillary +LN. Had not had a mammogram since age 32. Denies nipple bleeding, discharge, or inversion. She has decided to pursue bilateral mastectomy.   Dr. Ferguson recommend [facility].\par \par Per Dr. Tejada, will need either adjuvant or neoadjuvant chemotherapy. Pt has elected to do adjuvant chemotherapy.\par Pt states she would like to least amount of surgery possible for her reconstruction.\par \par H/o right lumpectomy x2 : benign nodules\par +family h/o breast cancer: paternal aunt, dx in her 40s\par \par Ht 5' Wt 128 lb  \par Current bra size: 34B, happy at this size\par Denies h/o VTE or MRSA infections\par Occ: Works at [employer]\par Here today with her daughter Sierra\par \par Interval hx (10/11/21): Pt presents today POD #12 s/p bilateral MRM and SLNB with immediate direct to implant reconstruction: left prepectoral, right subpectoral. Pt went to ED after discharge for increased left breast drain output and LLE swelling. Duplex negative. Now feeling better. Drain output: 1 (R IMF): /, 2 (R SL): , 3 (L SL): 10/7/7, 4 (L IMF): 10/5/5\par \par Interval hx (10/15/21):  Pt presents today POD #16 s/p bilateral MRM and SLNB with immediate direct to implant reconstruction: left prepectoral, right subpectoral. Taking PO abx as prescribed. C/o warmth to BL breasts but denies f/c or worsening pain or redness. Drain output: R- /5, L- 5//75\par \par Interval hx (10/25/21):  Here POD#26 s/p R MRM and SLNB with immediate direct to implant reconstruction; left pre-pectoral and right subpectoral.   left 15/10/8.  Denies fevers, chills, redness\par \par Interval hx (21):  Patient presents today POD#34 s/p B/L MRM and SLNB with immediate direct to implant reconstruction; left pre-pectoral and right subpectoral. Pt is doing better and reporting improvement in breast discomfort and swelling. Taking Gabapentin and Tylenol. Denies any f/c or drainage from incisions. \par \par Interval hx (11/15/21): 7 weeks s/p BL MRM and SLNB with immediate direct to implant reconstruction; left pre-pectoral and right subpectoral.  pt unhappy with asymmetry.  pt mostly unhappy 2/2 burning pain on left breast and stabbing pain at right IMF.   Pt to undergo chemotherapy and right breast XRT.

## 2021-11-18 ENCOUNTER — OUTPATIENT (OUTPATIENT)
Dept: OUTPATIENT SERVICES | Facility: HOSPITAL | Age: 71
LOS: 1 days | Discharge: HOME | End: 2021-11-18
Payer: MEDICARE

## 2021-11-18 ENCOUNTER — RESULT REVIEW (OUTPATIENT)
Age: 71
End: 2021-11-18

## 2021-11-18 DIAGNOSIS — Z90.49 ACQUIRED ABSENCE OF OTHER SPECIFIED PARTS OF DIGESTIVE TRACT: Chronic | ICD-10-CM

## 2021-11-18 DIAGNOSIS — Z90.13 ACQUIRED ABSENCE OF BILATERAL BREASTS AND NIPPLES: Chronic | ICD-10-CM

## 2021-11-18 DIAGNOSIS — Z90.710 ACQUIRED ABSENCE OF BOTH CERVIX AND UTERUS: Chronic | ICD-10-CM

## 2021-11-18 PROCEDURE — 99152 MOD SED SAME PHYS/QHP 5/>YRS: CPT

## 2021-11-18 PROCEDURE — 36561 INSERT TUNNELED CV CATH: CPT

## 2021-11-18 PROCEDURE — 76937 US GUIDE VASCULAR ACCESS: CPT | Mod: 26

## 2021-11-18 PROCEDURE — 77001 FLUOROGUIDE FOR VEIN DEVICE: CPT | Mod: 26

## 2021-11-23 ENCOUNTER — APPOINTMENT (OUTPATIENT)
Dept: INTERVENTIONAL RADIOLOGY/VASCULAR | Facility: CLINIC | Age: 71
End: 2021-11-23

## 2021-11-23 PROCEDURE — XXXXX: CPT | Mod: 1L

## 2021-11-24 NOTE — PHYSICAL EXAM
[Alert] : alert [No Acute Distress] : no acute distress [Well Nourished] : well nourished [Well Developed] : well developed [Healthy Appearance] : healthy appearance [Normal Sclera/Conjunctiva] : normal sclera/conjunctiva [PERRL] : pupils equal, round and reactive to light [No Respiratory Distress] : no respiratory distress [Normal Rate and Effort] : normal respiratory rhythm and effort [No Accessory Muscle Use] : no accessory muscle use [Normal PMI] : the apical impulse was normal [Normal Rate] : heart rate was normal  [No Rash] : no rash [No Skin Lesions] : no skin lesions [Oriented x3] : oriented to person, place, and time [Normal Insight/Judgement] : insight and judgment were intact [Normal Affect] : the affect was normal [Normal Mood] : the mood was normal [Recent Memory Normal] : recent memory was not impaired

## 2021-11-30 ENCOUNTER — APPOINTMENT (OUTPATIENT)
Dept: INTERVENTIONAL RADIOLOGY/VASCULAR | Facility: CLINIC | Age: 71
End: 2021-11-30

## 2021-11-30 DIAGNOSIS — Z95.828 PRESENCE OF OTHER VASCULAR IMPLANTS AND GRAFTS: ICD-10-CM

## 2021-11-30 PROCEDURE — XXXXX: CPT | Mod: 1L

## 2021-11-30 NOTE — PHYSICAL EXAM
[Alert] : alert [No Acute Distress] : no acute distress [Well Nourished] : well nourished [Well Developed] : well developed [Healthy Appearance] : healthy appearance [Normal Sclera/Conjunctiva] : normal sclera/conjunctiva [PERRL] : pupils equal, round and reactive to light [No Respiratory Distress] : no respiratory distress [Normal Rate and Effort] : normal respiratory rhythm and effort [No Accessory Muscle Use] : no accessory muscle use [Normal PMI] : the apical impulse was normal [Normal Rate] : heart rate was normal  [No Rash] : no rash [Oriented x3] : oriented to person, place, and time [Normal Insight/Judgement] : insight and judgment were intact [Normal Affect] : the affect was normal [Normal Mood] : the mood was normal [Restricted in physically strenuous activity but ambulatory and able to carry out work of a light or sedentary nature] : Restricted in physically strenuous activity but ambulatory and able to carry out work of a light or sedentary nature, e.g., light house work, office work

## 2021-12-02 ENCOUNTER — OUTPATIENT (OUTPATIENT)
Dept: OUTPATIENT SERVICES | Facility: HOSPITAL | Age: 71
LOS: 1 days | Discharge: HOME | End: 2021-12-02

## 2021-12-02 ENCOUNTER — APPOINTMENT (OUTPATIENT)
Dept: HEMATOLOGY ONCOLOGY | Facility: CLINIC | Age: 71
End: 2021-12-02
Payer: COMMERCIAL

## 2021-12-02 ENCOUNTER — NON-APPOINTMENT (OUTPATIENT)
Age: 71
End: 2021-12-02

## 2021-12-02 ENCOUNTER — LABORATORY RESULT (OUTPATIENT)
Age: 71
End: 2021-12-02

## 2021-12-02 ENCOUNTER — APPOINTMENT (OUTPATIENT)
Dept: INFUSION THERAPY | Facility: CLINIC | Age: 71
End: 2021-12-02
Payer: COMMERCIAL

## 2021-12-02 VITALS
TEMPERATURE: 98.2 F | RESPIRATION RATE: 14 BRPM | DIASTOLIC BLOOD PRESSURE: 72 MMHG | BODY MASS INDEX: 25.39 KG/M2 | SYSTOLIC BLOOD PRESSURE: 133 MMHG | WEIGHT: 130 LBS | HEART RATE: 72 BPM

## 2021-12-02 DIAGNOSIS — Z00.00 ENCOUNTER FOR GENERAL ADULT MEDICAL EXAMINATION W/OUT ABNORMAL FINDINGS: ICD-10-CM

## 2021-12-02 DIAGNOSIS — Z45.2 ENCOUNTER FOR ADJUSTMENT AND MANAGEMENT OF VASCULAR ACCESS DEVICE: ICD-10-CM

## 2021-12-02 DIAGNOSIS — Z90.13 ACQUIRED ABSENCE OF BILATERAL BREASTS AND NIPPLES: Chronic | ICD-10-CM

## 2021-12-02 DIAGNOSIS — Z90.710 ACQUIRED ABSENCE OF BOTH CERVIX AND UTERUS: Chronic | ICD-10-CM

## 2021-12-02 DIAGNOSIS — C50.919 MALIGNANT NEOPLASM OF UNSPECIFIED SITE OF UNSPECIFIED FEMALE BREAST: ICD-10-CM

## 2021-12-02 DIAGNOSIS — M79.602 PAIN IN LEFT ARM: ICD-10-CM

## 2021-12-02 DIAGNOSIS — Z90.49 ACQUIRED ABSENCE OF OTHER SPECIFIED PARTS OF DIGESTIVE TRACT: Chronic | ICD-10-CM

## 2021-12-02 DIAGNOSIS — C50.411 MALIGNANT NEOPLASM OF UPPER-OUTER QUADRANT OF RIGHT FEMALE BREAST: ICD-10-CM

## 2021-12-02 LAB
HCT VFR BLD CALC: 35.9 %
HGB BLD-MCNC: 12.1 G/DL
MCHC RBC-ENTMCNC: 29.8 PG
MCHC RBC-ENTMCNC: 33.7 G/DL
MCV RBC AUTO: 88.4 FL
PLATELET # BLD AUTO: 229 K/UL
PMV BLD: 10.6 FL
RBC # BLD: 4.06 M/UL
RBC # FLD: 13.5 %
WBC # FLD AUTO: 7.06 K/UL

## 2021-12-02 PROCEDURE — 99215 OFFICE O/P EST HI 40 MIN: CPT

## 2021-12-02 RX ORDER — PEGFILGRASTIM-CBQV 6 MG/.6ML
6 INJECTION, SOLUTION SUBCUTANEOUS ONCE
Refills: 0 | Status: COMPLETED | OUTPATIENT
Start: 2021-12-02 | End: 2021-12-02

## 2021-12-02 RX ORDER — DOXORUBICIN HYDROCHLORIDE 2 MG/ML
93 INJECTION, SOLUTION INTRAVENOUS ONCE
Refills: 0 | Status: COMPLETED | OUTPATIENT
Start: 2021-12-02 | End: 2021-12-02

## 2021-12-02 RX ORDER — FOSAPREPITANT DIMEGLUMINE 150 MG/5ML
150 INJECTION, POWDER, LYOPHILIZED, FOR SOLUTION INTRAVENOUS ONCE
Refills: 0 | Status: COMPLETED | OUTPATIENT
Start: 2021-12-02 | End: 2021-12-02

## 2021-12-02 RX ORDER — CYCLOPHOSPHAMIDE 100 MG
930 VIAL (EA) INTRAVENOUS ONCE
Refills: 0 | Status: COMPLETED | OUTPATIENT
Start: 2021-12-02 | End: 2021-12-02

## 2021-12-02 RX ORDER — DEXAMETHASONE 0.5 MG/5ML
12 ELIXIR ORAL ONCE
Refills: 0 | Status: COMPLETED | OUTPATIENT
Start: 2021-12-02 | End: 2021-12-02

## 2021-12-02 RX ORDER — ONDANSETRON 8 MG/1
8 TABLET, ORALLY DISINTEGRATING ORAL EVERY 6 HOURS
Qty: 30 | Refills: 1 | Status: ACTIVE | COMMUNITY
Start: 2021-12-02 | End: 1900-01-01

## 2021-12-02 RX ORDER — PROCHLORPERAZINE MALEATE 10 MG/1
10 TABLET ORAL
Qty: 120 | Refills: 3 | Status: ACTIVE | COMMUNITY
Start: 2021-12-02 | End: 1900-01-01

## 2021-12-02 RX ADMIN — PEGFILGRASTIM-CBQV 6 MILLIGRAM(S): 6 INJECTION, SOLUTION SUBCUTANEOUS at 16:13

## 2021-12-02 RX ADMIN — FOSAPREPITANT DIMEGLUMINE 300 MILLIGRAM(S): 150 INJECTION, POWDER, LYOPHILIZED, FOR SOLUTION INTRAVENOUS at 15:12

## 2021-12-02 RX ADMIN — DOXORUBICIN HYDROCHLORIDE 558 MILLIGRAM(S): 2 INJECTION, SOLUTION INTRAVENOUS at 15:36

## 2021-12-02 RX ADMIN — Medication 122 MILLIGRAM(S): at 15:12

## 2021-12-02 RX ADMIN — Medication 296.5 MILLIGRAM(S): at 15:36

## 2021-12-03 DIAGNOSIS — Z51.11 ENCOUNTER FOR ANTINEOPLASTIC CHEMOTHERAPY: ICD-10-CM

## 2021-12-09 ENCOUNTER — OUTPATIENT (OUTPATIENT)
Dept: OUTPATIENT SERVICES | Facility: HOSPITAL | Age: 71
LOS: 1 days | Discharge: HOME | End: 2021-12-09
Payer: MEDICARE

## 2021-12-09 ENCOUNTER — RESULT REVIEW (OUTPATIENT)
Age: 71
End: 2021-12-09

## 2021-12-09 DIAGNOSIS — Z90.49 ACQUIRED ABSENCE OF OTHER SPECIFIED PARTS OF DIGESTIVE TRACT: Chronic | ICD-10-CM

## 2021-12-09 DIAGNOSIS — Z90.13 ACQUIRED ABSENCE OF BILATERAL BREASTS AND NIPPLES: Chronic | ICD-10-CM

## 2021-12-09 DIAGNOSIS — Z90.710 ACQUIRED ABSENCE OF BOTH CERVIX AND UTERUS: Chronic | ICD-10-CM

## 2021-12-09 PROCEDURE — 93971 EXTREMITY STUDY: CPT | Mod: 26,LT

## 2021-12-11 PROBLEM — M79.602 PAIN OF LEFT UPPER EXTREMITY: Status: ACTIVE | Noted: 2021-12-02

## 2021-12-11 NOTE — ASSESSMENT
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 8/12 right axillary lymph nodes are positive for metastatic carcinoma with extensive extracapsular extension.\par Oncotype RS is 27. \par Staging PET/CT on 8/16/21 was negative for distant mets. \par \par The left breast has atypical lobular hyperplasia. \par \par Recommendations:\par -- She will start adjuvant chemotherapy with dose-dense AC every 2 weeks x 4 followed by Taxol weekly x 12. We discussed chemotherapy related side effects again which may include but not limit to bone marrow suppression, cytopenia, increase risk of infection, nausea, vomiting, diarrhea, fatigue, alopecia, infusional reaction, and neuropathy. Adriamycin may cause cardiomyopathy and decreased heart function. It has a small risk for developing leukemia and bone marrow disorder. The benefit and side effects ratio was weighed. She agreed to proceed with chemo. \par -- TTE on 9/30/21 showed normal LV Ejection Fraction of 66 %. Echo also reported moderately enlarged right ventricle, mild-moderate tricuspid regurgitation, mild to moderate pulmonic valve regurgitation. Discussed with Dr. Robert, cardiology. He compared patient's current and previous Echo. There is only mild enlarged right ventricle. She may proceed with chemo. \par -- Will give Neulasta injection after chemo to prevent neutropenia. \par -- Zofran and Compazine as needed for n/v.\par -- Imodium as needed for diarrhea. \par -- Port placed on 11/18. Port site is dry and clean. \par  -- Order blood work prior to chemo: CBC, BMP, LFT. \par -- Regarding to positive margin, Dr. Ferguson discussed with her and plan to have reexcision after completion of adjuvant chemotherapy. \par -- She will start endocrine therapy after chemotherapy. The benefit ans side effects were reviewed briefly. Will discuss with her in more detail.\par \par RTC 2 weeks for followup \par \par Case was seen and discussed with Dr. Tejada who agreed with the assessment and plan.\par \par

## 2021-12-11 NOTE — HISTORY OF PRESENT ILLNESS
[de-identified] : JOSE FRANCISCO THOMAS is a 71 year old female PMHx of cervical cancer s/p hysterectomy and unilateral salpingo-oophorectomy,  HTN presents for newly diagnosed invasive breast cancer. \par \par Patient first palpated a mass in the RUOQ herself within the past year. She had pain at the site and feels that it is growing. She went for b/l diagnostic mammo and sonogram on 6/17/2021. Her left breast had no suspicious findings however on her right breast a spiculated mass in the RUOQ as well as a second mass superior to with a thickened axillary 2.2 cm axillary node was seen. \par -breasts are heterogeneously dense\par -R: UOQ, spiculated mass, with calcifications, c/w US finding below \par -R: Medial to this mass in the posterior depth, an area of architectural distortion. \par -R: Slightly superior to this mass, there is a an additional circumscribed mass also seen on concurrent ultrasound.\par Right breast:\par - In the axilla, at the 10 to 11:00 position 12 cm from the nipple, there is a cortically thickened axillary lymph node measuring 2.2 cm --> BIOPSY \par -Corresponding to the area palpable concern at the 10 to 11:00 position 5 to 8 cm from the nipple, there is a spiculated mass with associated coarse calcifications measuring 2.7 x 1.2 x 1.8 cm --> BIOPSY \par -At the 11:00 position 10 cm from the nipple, there is a circumscribed hypoechoic mass measuring 1.3 x 1.2 x 0.4 cm, corresponding to mammographic findings --> BIOPSY \par -At the 6:00 position 5 cm from the nipple, there is a circumscribed hypoechoic mass measuring 0.3 x 0.3 x 0.2 cm.\par -At the 6:00 position 4 cm from the nipple, there is a circumscribed hypoechoic mass measuring 0.6 x 0.6 x 0.2 cm --> BIOPSY \par BI-RADS Category 5: Highly Suggestive of Malignancy\par \par On 7/21/2021, she went for US guided biopsy of  the 2cm mass which showed invasive moderately differential ductal carcinoma, with dominant micrpapillary features\par 1. Breast, right 10-11 N5-8 mass, ultrasound guided needle core\par biopsies:\par - Invasive moderately differentiated ductal carcinoma with dominant micropapillary features, ER 99%, [place] 15%. Ki67 20%, Her 2 negative (IHC 1+, Her2/CEP17 1.9)\par - Ductal carcinoma in-situ (DCIS), cribriform type with comedo\par necrosis and microcalcifications, intermediate nuclear grade.\par - Foci of lymphovascular invasion are present.\par \par 2. Breast, right 11 N10 mass, ultrasound guided needle core biopsies:\par - Benign atrophic fatty breast tissue with a dominant macrocyst wall fragment demonstrating an attenuated epithelial lining; consistent with a dilated duct.\par \par 3. Breast, right 6 N4 mass, ultrasound guided needle core biopsies:\par - Hyalinized fibroadenoma.\par \par 4. Breast, right axillary mass, ultrasound guided needle core biopsies:\par - Lymph node fragments containing metastatic carcinoma (1/1), the largest contiguous focus of which measures 6.0 mm (microscopic measurement).\par - No extracapsular extension is identified in this biopsy material.\par \par On 8/16/21, she had a PET/CT which showed pathologic FDG uptake (SUV 7) coregistering with 3.6 x 2 cm right breast mass and pathologic FDG uptake in 2 cm right axillary nodule (SUV 6.1) consistent with documented biologic tumor activity. No other sites of abnormal FDG uptake\par \par On 8/18/21, she had b/l breast MRI MRI which showed the biopsy proven right breast carcinoma 1.9 x 3.7 x 3.5 cm and 2 cm right axillary adenopathy. No additional suspicious enhancement in either breast. \par \par She saw Dr. Ferguson for surgical consultation. She was given options to have neoadjuvant chemo followed by surgery or immediate surgery. \par \par Her family history is significant for breast cancer in her paternal aunt in her 40s,  2 uncles who had "brain cancer"; and her mom who had cervical cancer. \par  [de-identified] : 10/27/21:\dharmesh Galicia is here today for followup visit. She was previously seen on 8/20/21. She was diagnosed with  IDC of the right breast with 3 cm mass and positive right axillary lymph node, ER/AR positive, Her-2 negative. Clinical stage is T3N1 with Oncotype RS 27. She was given an option to have neoadjuvant chemotherapy prior to surgery but she opted to have surgery first. On 9/29/21, she underwent right breast modified radical skin-sparing mastectomy, right ALND, left breast simple skin-sparing mastectomy and left axillary SLNB with immediate direct to implant reconstruction. \par - The right mastectomy revealed 3.7 cm invasive poorly differentiated micropapillary carcinoma, ER 85%, AR 15%, Her-2 negative (FISH ration 1.8), Ki 67 20-25% and 0.8 cm invasive well differentiated tubulo-lobular variant of ductal carcinoma, %, AR 90%, Her-2 negative and Ki 67 3-5%,  Non-extensive ductal carcinoma in-situ (DCIS), cribriform and micropapillary types with comedo necrosis and microcalcifications, intermediate to focally high nuclear grade. Numerous foci of lymphovascular invasion are present. The invasive tumor extends to focally involve (touches both ink and cautery) the blue inked surface. The nipple, skin, and deep fascial margin are negative for carcinoma. Right ALND reveal metastatic carcinoma present in eight out of twelve axillary lymph nodes (8/12) with extensive extracapsular extension seen. The largest node contains a metallic "ring-shaped" biopsy clip and shows focal prior biopsy site changes.\par AJCC 8th Edition Pathologic Stage, m(2)pT2, pN2a, pMx.\par - The left breast simple skin-sparing mastectomy show atrophic predominantly fatty breast tissue with focal atypical lobular hyperplasia (ALH). 2 sentinel lymph noses are negative for malignancy.  \par She recovered well from surgery and is here today to discuss adjuvant systemic therapy. \par \par 12/2/21\dharmesh Galicia is here today for followup visit and chemotherapy. 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 8/12 right axillary lymph nodes are positive for metastatic carcinoma with extensive extracapsular extension. Oncotype RS is 27. She is indicated for adjuvant chemotherapy.  She had port a cath placed on 11/18 to left chest wall and felt pain at port site with movement of left upper extremity. She followed up with IR regarding concerns, no evidence of port infection, or blockage/clot within port catheter. She also had HOMA done on 9/30 LV EF normal. \par \par

## 2021-12-11 NOTE — PHYSICAL EXAM
[Restricted in physically strenuous activity but ambulatory and able to carry out work of a light or sedentary nature] : Status 1- Restricted in physically strenuous activity but ambulatory and able to carry out work of a light or sedentary nature, e.g., light house work, office work [Normal] : affect appropriate [de-identified] : S/P b/l mastectomy and implant reconstruction. Surgical incisions are healing well. There is no palpable abnormality.  [de-identified] : port-a-cath placed to left chest wall, healing well, Steri-Strips in place.

## 2021-12-11 NOTE — CONSULT LETTER
[Dear  ___] : Dear  [unfilled], [Courtesy Letter:] : I had the pleasure of seeing your patient, [unfilled], in my office today. [Please see my note below.] : Please see my note below. [Sincerely,] : Sincerely, [DrPatrick  ___] : Dr. EAST [FreeTextEntry3] : Keyla Tejada MD [DrPatrick ___] : Dr. EAST

## 2021-12-16 ENCOUNTER — APPOINTMENT (OUTPATIENT)
Dept: INFUSION THERAPY | Facility: CLINIC | Age: 71
End: 2021-12-16
Payer: COMMERCIAL

## 2021-12-16 ENCOUNTER — LABORATORY RESULT (OUTPATIENT)
Age: 71
End: 2021-12-16

## 2021-12-16 ENCOUNTER — APPOINTMENT (OUTPATIENT)
Dept: HEMATOLOGY ONCOLOGY | Facility: CLINIC | Age: 71
End: 2021-12-16
Payer: COMMERCIAL

## 2021-12-16 VITALS
BODY MASS INDEX: 24.74 KG/M2 | HEART RATE: 74 BPM | DIASTOLIC BLOOD PRESSURE: 62 MMHG | SYSTOLIC BLOOD PRESSURE: 118 MMHG | TEMPERATURE: 97.3 F | WEIGHT: 126 LBS | RESPIRATION RATE: 14 BRPM | OXYGEN SATURATION: 98 % | HEIGHT: 60 IN

## 2021-12-16 DIAGNOSIS — R60.9 EDEMA, UNSPECIFIED: ICD-10-CM

## 2021-12-16 DIAGNOSIS — K59.00 CONSTIPATION, UNSPECIFIED: ICD-10-CM

## 2021-12-16 LAB
HCT VFR BLD CALC: 35.9 %
HGB BLD-MCNC: 12.3 G/DL
MCHC RBC-ENTMCNC: 29.9 PG
MCHC RBC-ENTMCNC: 34.3 G/DL
MCV RBC AUTO: 87.1 FL
PLATELET # BLD AUTO: 174 K/UL
PMV BLD: 10.9 FL
RBC # BLD: 4.12 M/UL
RBC # FLD: 13.3 %
WBC # FLD AUTO: 17.62 K/UL

## 2021-12-16 PROCEDURE — 99215 OFFICE O/P EST HI 40 MIN: CPT

## 2021-12-16 RX ORDER — DEXAMETHASONE 0.5 MG/5ML
12 ELIXIR ORAL ONCE
Refills: 0 | Status: COMPLETED | OUTPATIENT
Start: 2021-12-16 | End: 2021-12-16

## 2021-12-16 RX ORDER — DOXORUBICIN HYDROCHLORIDE 2 MG/ML
93 INJECTION, SOLUTION INTRAVENOUS ONCE
Refills: 0 | Status: COMPLETED | OUTPATIENT
Start: 2021-12-16 | End: 2021-12-16

## 2021-12-16 RX ORDER — CYCLOPHOSPHAMIDE 100 MG
930 VIAL (EA) INTRAVENOUS ONCE
Refills: 0 | Status: COMPLETED | OUTPATIENT
Start: 2021-12-16 | End: 2021-12-16

## 2021-12-16 RX ORDER — FOSAPREPITANT DIMEGLUMINE 150 MG/5ML
150 INJECTION, POWDER, LYOPHILIZED, FOR SOLUTION INTRAVENOUS ONCE
Refills: 0 | Status: COMPLETED | OUTPATIENT
Start: 2021-12-16 | End: 2021-12-16

## 2021-12-16 RX ORDER — PEGFILGRASTIM-CBQV 6 MG/.6ML
6 INJECTION, SOLUTION SUBCUTANEOUS ONCE
Refills: 0 | Status: COMPLETED | OUTPATIENT
Start: 2021-12-16 | End: 2021-12-16

## 2021-12-16 RX ADMIN — Medication 296.5 MILLIGRAM(S): at 15:36

## 2021-12-16 RX ADMIN — DOXORUBICIN HYDROCHLORIDE 558 MILLIGRAM(S): 2 INJECTION, SOLUTION INTRAVENOUS at 15:35

## 2021-12-16 RX ADMIN — Medication 122 MILLIGRAM(S): at 14:39

## 2021-12-16 RX ADMIN — PEGFILGRASTIM-CBQV 6 MILLIGRAM(S): 6 INJECTION, SOLUTION SUBCUTANEOUS at 17:37

## 2021-12-16 RX ADMIN — FOSAPREPITANT DIMEGLUMINE 300 MILLIGRAM(S): 150 INJECTION, POWDER, LYOPHILIZED, FOR SOLUTION INTRAVENOUS at 14:40

## 2021-12-17 PROBLEM — K59.00 CONSTIPATION: Status: ACTIVE | Noted: 2021-12-17

## 2021-12-17 NOTE — PHYSICAL EXAM
[Restricted in physically strenuous activity but ambulatory and able to carry out work of a light or sedentary nature] : Status 1- Restricted in physically strenuous activity but ambulatory and able to carry out work of a light or sedentary nature, e.g., light house work, office work [Normal] : affect appropriate [de-identified] : S/P b/l mastectomy and implant reconstruction. Surgical incisions are healing well. There is no palpable abnormality.  [de-identified] : port-a-cath placed to left chest wall, healing well, Steri-Strips in place.

## 2021-12-17 NOTE — CONSULT LETTER
[Dear  ___] : Dear  [unfilled], [Courtesy Letter:] : I had the pleasure of seeing your patient, [unfilled], in my office today. [Please see my note below.] : Please see my note below. [Sincerely,] : Sincerely, [DrPatrick  ___] : Dr. EAST [DrPatrick ___] : Dr. EAST [FreeTextEntry3] : Keyla Tejada MD

## 2021-12-17 NOTE — ASSESSMENT
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 8/12 right axillary lymph nodes are positive for metastatic carcinoma with extensive extracapsular extension.\par Oncotype RS is 27. \par Staging PET/CT on 8/16/21 was negative for distant mets. \par \par The left breast has atypical lobular hyperplasia. \par \par Recommendations:\par -- Proceed with cycle #2 of adjuvant chemotherapy dose-dense AC.  \par -- TTE on 9/30/21 showed normal LV Ejection Fraction of 66 %. Echo also reported moderately enlarged right ventricle, mild-moderate tricuspid regurgitation, mild to moderate pulmonic valve regurgitation. Discussed with Dr. Robert, cardiology. He compared patient's current and previous Echo. There is only mild enlarged right ventricle. Will recheck 2D Echo.\par -- Will give Neulasta injection after chemo to prevent neutropenia. \par -- Zofran and Compazine as needed for n/v.\par -- Constipation. Advised to take Colace 100 mg q8h as need and Senokot 2 tabs in the night as needed for constipation.  \par -- Port placed on 11/18. Port site is dry and clean. \par -- Order blood work prior to chemo: CBC, BMP, LFT. \par -- Regarding to positive margin, Dr. Ferguson discussed with her and plan to have reexcision after completion of adjuvant chemotherapy. \par -- She will start endocrine therapy after chemotherapy. \par \par RTC 2 weeks for followup \par \par Case was seen and discussed with Dr. Tejada who agreed with the assessment and plan.\par \par

## 2021-12-30 ENCOUNTER — LABORATORY RESULT (OUTPATIENT)
Age: 71
End: 2021-12-30

## 2021-12-30 ENCOUNTER — APPOINTMENT (OUTPATIENT)
Dept: INFUSION THERAPY | Facility: CLINIC | Age: 71
End: 2021-12-30
Payer: COMMERCIAL

## 2021-12-30 ENCOUNTER — OUTPATIENT (OUTPATIENT)
Dept: OUTPATIENT SERVICES | Facility: HOSPITAL | Age: 71
LOS: 1 days | Discharge: HOME | End: 2021-12-30

## 2021-12-30 ENCOUNTER — APPOINTMENT (OUTPATIENT)
Dept: HEMATOLOGY ONCOLOGY | Facility: CLINIC | Age: 71
End: 2021-12-30
Payer: COMMERCIAL

## 2021-12-30 VITALS — WEIGHT: 129 LBS | BODY MASS INDEX: 25.19 KG/M2

## 2021-12-30 VITALS
HEIGHT: 60 IN | SYSTOLIC BLOOD PRESSURE: 162 MMHG | WEIGHT: 126 LBS | DIASTOLIC BLOOD PRESSURE: 75 MMHG | TEMPERATURE: 97.2 F | RESPIRATION RATE: 16 BRPM | HEART RATE: 80 BPM | BODY MASS INDEX: 24.74 KG/M2

## 2021-12-30 DIAGNOSIS — Z90.49 ACQUIRED ABSENCE OF OTHER SPECIFIED PARTS OF DIGESTIVE TRACT: Chronic | ICD-10-CM

## 2021-12-30 DIAGNOSIS — Z90.13 ACQUIRED ABSENCE OF BILATERAL BREASTS AND NIPPLES: Chronic | ICD-10-CM

## 2021-12-30 DIAGNOSIS — Z51.11 ENCOUNTER FOR ANTINEOPLASTIC CHEMOTHERAPY: ICD-10-CM

## 2021-12-30 DIAGNOSIS — Z90.710 ACQUIRED ABSENCE OF BOTH CERVIX AND UTERUS: Chronic | ICD-10-CM

## 2021-12-30 LAB
ALBUMIN SERPL ELPH-MCNC: 4.5 G/DL
ALBUMIN SERPL ELPH-MCNC: 4.6 G/DL
ALP BLD-CCNC: 63 U/L
ALP BLD-CCNC: 97 U/L
ALT SERPL-CCNC: 11 U/L
ALT SERPL-CCNC: 8 U/L
ANION GAP SERPL CALC-SCNC: 15 MMOL/L
ANION GAP SERPL CALC-SCNC: 16 MMOL/L
AST SERPL-CCNC: 13 U/L
AST SERPL-CCNC: 19 U/L
BILIRUB DIRECT SERPL-MCNC: <0.2 MG/DL
BILIRUB DIRECT SERPL-MCNC: <0.2 MG/DL
BILIRUB INDIRECT SERPL-MCNC: <0.2 MG/DL
BILIRUB INDIRECT SERPL-MCNC: >0.1 MG/DL
BILIRUB SERPL-MCNC: 0.3 MG/DL
BILIRUB SERPL-MCNC: <0.2 MG/DL
BUN SERPL-MCNC: 16 MG/DL
BUN SERPL-MCNC: 29 MG/DL
CALCIUM SERPL-MCNC: 9 MG/DL
CALCIUM SERPL-MCNC: 9.6 MG/DL
CHLORIDE SERPL-SCNC: 104 MMOL/L
CHLORIDE SERPL-SCNC: 106 MMOL/L
CO2 SERPL-SCNC: 20 MMOL/L
CO2 SERPL-SCNC: 20 MMOL/L
CREAT SERPL-MCNC: 0.7 MG/DL
CREAT SERPL-MCNC: 0.9 MG/DL
GLUCOSE SERPL-MCNC: 65 MG/DL
GLUCOSE SERPL-MCNC: 79 MG/DL
HCT VFR BLD CALC: 31.1 %
HGB BLD-MCNC: 10.5 G/DL
MCHC RBC-ENTMCNC: 29.7 PG
MCHC RBC-ENTMCNC: 33.8 G/DL
MCV RBC AUTO: 87.9 FL
PLATELET # BLD AUTO: 190 K/UL
PMV BLD: 11 FL
POTASSIUM SERPL-SCNC: 4.2 MMOL/L
POTASSIUM SERPL-SCNC: 4.8 MMOL/L
PROT SERPL-MCNC: 6.6 G/DL
PROT SERPL-MCNC: 7.1 G/DL
RBC # BLD: 3.54 M/UL
RBC # FLD: 14.3 %
SODIUM SERPL-SCNC: 140 MMOL/L
SODIUM SERPL-SCNC: 141 MMOL/L
WBC # FLD AUTO: 29.16 K/UL

## 2021-12-30 PROCEDURE — 99215 OFFICE O/P EST HI 40 MIN: CPT

## 2021-12-30 RX ORDER — FOSAPREPITANT DIMEGLUMINE 150 MG/5ML
150 INJECTION, POWDER, LYOPHILIZED, FOR SOLUTION INTRAVENOUS ONCE
Refills: 0 | Status: COMPLETED | OUTPATIENT
Start: 2021-12-30 | End: 2021-12-30

## 2021-12-30 RX ORDER — PEGFILGRASTIM-CBQV 6 MG/.6ML
6 INJECTION, SOLUTION SUBCUTANEOUS ONCE
Refills: 0 | Status: COMPLETED | OUTPATIENT
Start: 2021-12-30 | End: 2021-12-30

## 2021-12-30 RX ORDER — CYCLOPHOSPHAMIDE 100 MG
930 VIAL (EA) INTRAVENOUS ONCE
Refills: 0 | Status: COMPLETED | OUTPATIENT
Start: 2021-12-30 | End: 2021-12-30

## 2021-12-30 RX ORDER — DEXAMETHASONE 0.5 MG/5ML
12 ELIXIR ORAL ONCE
Refills: 0 | Status: COMPLETED | OUTPATIENT
Start: 2021-12-30 | End: 2021-12-30

## 2021-12-30 RX ORDER — DOXORUBICIN HYDROCHLORIDE 2 MG/ML
93 INJECTION, SOLUTION INTRAVENOUS ONCE
Refills: 0 | Status: COMPLETED | OUTPATIENT
Start: 2021-12-30 | End: 2021-12-30

## 2021-12-30 RX ADMIN — FOSAPREPITANT DIMEGLUMINE 300 MILLIGRAM(S): 150 INJECTION, POWDER, LYOPHILIZED, FOR SOLUTION INTRAVENOUS at 14:28

## 2021-12-30 RX ADMIN — Medication 122 MILLIGRAM(S): at 14:28

## 2021-12-30 RX ADMIN — DOXORUBICIN HYDROCHLORIDE 558 MILLIGRAM(S): 2 INJECTION, SOLUTION INTRAVENOUS at 15:29

## 2021-12-30 RX ADMIN — PEGFILGRASTIM-CBQV 6 MILLIGRAM(S): 6 INJECTION, SOLUTION SUBCUTANEOUS at 15:30

## 2021-12-30 RX ADMIN — Medication 296.5 MILLIGRAM(S): at 15:30

## 2021-12-31 NOTE — HISTORY OF PRESENT ILLNESS
[de-identified] : JOSE FRANCISCO THOMAS is a 71 year old female PMHx of cervical cancer s/p hysterectomy and unilateral salpingo-oophorectomy,  HTN presents for newly diagnosed invasive breast cancer. \par \par Patient first palpated a mass in the RUOQ herself within the past year. She had pain at the site and feels that it is growing. She went for b/l diagnostic mammo and sonogram on 6/17/2021. Her left breast had no suspicious findings however on her right breast a spiculated mass in the RUOQ as well as a second mass superior to with a thickened axillary 2.2 cm axillary node was seen. \par -breasts are heterogeneously dense\par -R: UOQ, spiculated mass, with calcifications, c/w US finding below \par -R: Medial to this mass in the posterior depth, an area of architectural distortion. \par -R: Slightly superior to this mass, there is a an additional circumscribed mass also seen on concurrent ultrasound.\par Right breast:\par - In the axilla, at the 10 to 11:00 position 12 cm from the nipple, there is a cortically thickened axillary lymph node measuring 2.2 cm --> BIOPSY \par -Corresponding to the area palpable concern at the 10 to 11:00 position 5 to 8 cm from the nipple, there is a spiculated mass with associated coarse calcifications measuring 2.7 x 1.2 x 1.8 cm --> BIOPSY \par -At the 11:00 position 10 cm from the nipple, there is a circumscribed hypoechoic mass measuring 1.3 x 1.2 x 0.4 cm, corresponding to mammographic findings --> BIOPSY \par -At the 6:00 position 5 cm from the nipple, there is a circumscribed hypoechoic mass measuring 0.3 x 0.3 x 0.2 cm.\par -At the 6:00 position 4 cm from the nipple, there is a circumscribed hypoechoic mass measuring 0.6 x 0.6 x 0.2 cm --> BIOPSY \par BI-RADS Category 5: Highly Suggestive of Malignancy\par \par On 7/21/2021, she went for US guided biopsy of  the 2cm mass which showed invasive moderately differential ductal carcinoma, with dominant micrpapillary features\par 1. Breast, right 10-11 N5-8 mass, ultrasound guided needle core\par biopsies:\par - Invasive moderately differentiated ductal carcinoma with dominant micropapillary features, ER 99%, TN 15%. Ki67 20%, Her 2 negative (IHC 1+, Her2/CEP17 1.9)\par - Ductal carcinoma in-situ (DCIS), cribriform type with comedo\par necrosis and microcalcifications, intermediate nuclear grade.\par - Foci of lymphovascular invasion are present.\par \par 2. Breast, right 11 N10 mass, ultrasound guided needle core biopsies:\par - Benign atrophic fatty breast tissue with a dominant macrocyst wall fragment demonstrating an attenuated epithelial lining; consistent with a dilated duct.\par \par 3. Breast, right 6 N4 mass, ultrasound guided needle core biopsies:\par - Hyalinized fibroadenoma.\par \par 4. Breast, right axillary mass, ultrasound guided needle core biopsies:\par - Lymph node fragments containing metastatic carcinoma (1/1), the largest contiguous focus of which measures 6.0 mm (microscopic measurement).\par - No extracapsular extension is identified in this biopsy material.\par \par On 8/16/21, she had a PET/CT which showed pathologic FDG uptake (SUV 7) coregistering with 3.6 x 2 cm right breast mass and pathologic FDG uptake in 2 cm right axillary nodule (SUV 6.1) consistent with documented biologic tumor activity. No other sites of abnormal FDG uptake\par \par On 8/18/21, she had b/l breast MRI MRI which showed the biopsy proven right breast carcinoma 1.9 x 3.7 x 3.5 cm and 2 cm right axillary adenopathy. No additional suspicious enhancement in either breast. \par \par She saw Dr. Ferguson for surgical consultation. She was given options to have neoadjuvant chemo followed by surgery or immediate surgery. \par \par Her family history is significant for breast cancer in her paternal aunt in her 40s,  2 uncles who had "brain cancer"; and her mom who had cervical cancer. \par  [de-identified] : 10/27/21:\dharmesh Galicia is here today for followup visit. She was previously seen on 8/20/21. She was diagnosed with  IDC of the right breast with 3 cm mass and positive right axillary lymph node, ER/MO positive, Her-2 negative. Clinical stage is T3N1 with Oncotype RS 27. She was given an option to have neoadjuvant chemotherapy prior to surgery but she opted to have surgery first. On 9/29/21, she underwent right breast modified radical skin-sparing mastectomy, right ALND, left breast simple skin-sparing mastectomy and left axillary SLNB with immediate direct to implant reconstruction. \par - The right mastectomy revealed 3.7 cm invasive poorly differentiated micropapillary carcinoma, ER 85%, MO 15%, Her-2 negative (FISH ration 1.8), Ki 67 20-25% and 0.8 cm invasive well differentiated tubulo-lobular variant of ductal carcinoma, %, MO 90%, Her-2 negative and Ki 67 3-5%,  Non-extensive ductal carcinoma in-situ (DCIS), cribriform and micropapillary types with comedo necrosis and microcalcifications, intermediate to focally high nuclear grade. Numerous foci of lymphovascular invasion are present. The invasive tumor extends to focally involve (touches both ink and cautery) the blue inked surface. The nipple, skin, and deep fascial margin are negative for carcinoma. Right ALND reveal metastatic carcinoma present in eight out of twelve axillary lymph nodes (8/12) with extensive extracapsular extension seen. The largest node contains a metallic "ring-shaped" biopsy clip and shows focal prior biopsy site changes.\par AJCC 8th Edition Pathologic Stage, m(2)pT2, pN2a, pMx.\par - The left breast simple skin-sparing mastectomy show atrophic predominantly fatty breast tissue with focal atypical lobular hyperplasia (ALH). 2 sentinel lymph noses are negative for malignancy.  \par She recovered well from surgery and is here today to discuss adjuvant systemic therapy. \par \par 12/2/21\dharmesh Galicia is here today for followup visit and chemotherapy. She has stage IIIA ([id number]) right breast multifocal invasive poorly differentiated micropapillary carcinoma, 3.7 cm and invasive well differentiated tubulo-lobular variant of ductal carcinoma, 0.8 cm, ER/MO positive, Her-2 negative, s/p right breast modified radical skin-sparing mastectomy, right ALND, left breast simple skin-sparing mastectomy and left axillary SLNB with immediate implant reconstruction. 8/12 right axillary lymph nodes are positive for metastatic carcinoma with extensive extracapsular extension. Oncotype RS is 27. She is indicated for adjuvant chemotherapy.  She had port a cath placed on 11/18 to left chest wall and felt pain at port site with movement of left upper extremity. She followed up with IR regarding concerns, no evidence of port infection, or blockage/clot within port catheter. She also had HOMA done on 9/30 LV EF normal. \par \par 12/16/21\dharmesh Galicia is here today for followup visit and chemotherapy. She has stage IIIA ([id number]) right breast multifocal invasive poorly differentiated micropapillary carcinoma, 3.7 cm and invasive well differentiated tubulo-lobular variant of ductal carcinoma, 0.8 cm, ER/MO positive, Her-2 negative, s/p right breast modified radical skin-sparing mastectomy, right ALND, left breast simple skin-sparing mastectomy and left axillary SLNB with immediate implant reconstruction. 8/12 right axillary lymph nodes are positive for metastatic carcinoma with extensive extracapsular extension. Oncotype RS is 27. She started adjuvant chemotherapy with dose-dense AC. To date she received 1 cycle of AC, tolerating well. She denies nausea, vomiting, diarrhea or breast pain. She c/o that her constipation is worse from her baseline, taking Senna twice daily.\par She also had HOMA done on 9/30/21 LV EF normal. \par \par 12/30/21\dharmesh Galicia is here today for followup visit and chemotherapy. She has stage IIIA ([id number]) right breast multifocal invasive poorly differentiated micropapillary carcinoma, 3.7 cm and invasive well differentiated tubulo-lobular variant of ductal carcinoma, 0.8 cm, ER/MO positive, Her-2 negative, s/p right breast modified radical skin-sparing mastectomy, right ALND, left breast simple skin-sparing mastectomy and left axillary SLNB with immediate implant reconstruction. 8/12 right axillary lymph nodes are positive for metastatic carcinoma with extensive extracapsular extension. She started adjuvant chemotherapy with dose-dense AC. To date she received 2 cycles of AC, tolerating well. She denies nausea, vomiting, diarrhea or breast pain. Her constipation has improved with senna 3 tablets and colace. She also had HOMA done on 9/30/21 LV EF normal. \par \par

## 2021-12-31 NOTE — PHYSICAL EXAM
[Restricted in physically strenuous activity but ambulatory and able to carry out work of a light or sedentary nature] : Status 1- Restricted in physically strenuous activity but ambulatory and able to carry out work of a light or sedentary nature, e.g., light house work, office work [Normal] : affect appropriate [de-identified] : S/P b/l mastectomy and implant reconstruction. Surgical incisions are healing well. There is no palpable abnormality.  [de-identified] : port-a-cath placed to left chest wall, healing well, Steri-Strips in place.

## 2022-01-13 ENCOUNTER — LABORATORY RESULT (OUTPATIENT)
Age: 72
End: 2022-01-13

## 2022-01-13 ENCOUNTER — APPOINTMENT (OUTPATIENT)
Dept: HEMATOLOGY ONCOLOGY | Facility: CLINIC | Age: 72
End: 2022-01-13
Payer: COMMERCIAL

## 2022-01-13 ENCOUNTER — APPOINTMENT (OUTPATIENT)
Dept: INFUSION THERAPY | Facility: CLINIC | Age: 72
End: 2022-01-13
Payer: COMMERCIAL

## 2022-01-13 VITALS
HEART RATE: 85 BPM | HEIGHT: 60 IN | WEIGHT: 126 LBS | TEMPERATURE: 98.2 F | DIASTOLIC BLOOD PRESSURE: 65 MMHG | SYSTOLIC BLOOD PRESSURE: 152 MMHG | RESPIRATION RATE: 14 BRPM | BODY MASS INDEX: 24.74 KG/M2

## 2022-01-13 LAB
HCT VFR BLD CALC: 34 %
HGB BLD-MCNC: 11.5 G/DL
MCHC RBC-ENTMCNC: 29.9 PG
MCHC RBC-ENTMCNC: 33.8 G/DL
MCV RBC AUTO: 88.3 FL
PLATELET # BLD AUTO: 211 K/UL
PMV BLD: 11 FL
RBC # BLD: 3.85 M/UL
RBC # FLD: 16.1 %
WBC # FLD AUTO: 28.97 K/UL

## 2022-01-13 PROCEDURE — 99215 OFFICE O/P EST HI 40 MIN: CPT

## 2022-01-13 RX ORDER — DOXORUBICIN HYDROCHLORIDE 2 MG/ML
90 INJECTION, SOLUTION INTRAVENOUS ONCE
Refills: 0 | Status: COMPLETED | OUTPATIENT
Start: 2022-01-13 | End: 2022-01-13

## 2022-01-13 RX ORDER — PEGFILGRASTIM-CBQV 6 MG/.6ML
6 INJECTION, SOLUTION SUBCUTANEOUS ONCE
Refills: 0 | Status: COMPLETED | OUTPATIENT
Start: 2022-01-13 | End: 2022-01-13

## 2022-01-13 RX ORDER — CYCLOPHOSPHAMIDE 100 MG
920 VIAL (EA) INTRAVENOUS ONCE
Refills: 0 | Status: COMPLETED | OUTPATIENT
Start: 2022-01-13 | End: 2022-01-13

## 2022-01-13 RX ORDER — DEXAMETHASONE 0.5 MG/5ML
12 ELIXIR ORAL ONCE
Refills: 0 | Status: COMPLETED | OUTPATIENT
Start: 2022-01-13 | End: 2022-01-13

## 2022-01-13 RX ORDER — FOSAPREPITANT DIMEGLUMINE 150 MG/5ML
150 INJECTION, POWDER, LYOPHILIZED, FOR SOLUTION INTRAVENOUS ONCE
Refills: 0 | Status: COMPLETED | OUTPATIENT
Start: 2022-01-13 | End: 2022-01-13

## 2022-01-13 RX ADMIN — DOXORUBICIN HYDROCHLORIDE 540 MILLIGRAM(S): 2 INJECTION, SOLUTION INTRAVENOUS at 15:12

## 2022-01-13 RX ADMIN — PEGFILGRASTIM-CBQV 6 MILLIGRAM(S): 6 INJECTION, SOLUTION SUBCUTANEOUS at 15:10

## 2022-01-13 RX ADMIN — Medication 296 MILLIGRAM(S): at 15:13

## 2022-01-13 RX ADMIN — FOSAPREPITANT DIMEGLUMINE 300 MILLIGRAM(S): 150 INJECTION, POWDER, LYOPHILIZED, FOR SOLUTION INTRAVENOUS at 14:24

## 2022-01-13 RX ADMIN — Medication 122 MILLIGRAM(S): at 14:24

## 2022-01-13 NOTE — PHYSICAL EXAM
[Restricted in physically strenuous activity but ambulatory and able to carry out work of a light or sedentary nature] : Status 1- Restricted in physically strenuous activity but ambulatory and able to carry out work of a light or sedentary nature, e.g., light house work, office work [Normal] : affect appropriate [de-identified] : S/P b/l mastectomy and implant reconstruction. Surgical incisions are healing well. There is no palpable abnormality.  [de-identified] : port-a-cath placed to left chest wall, healing well, Steri-Strips in place.

## 2022-01-13 NOTE — ASSESSMENT
[FreeTextEntry1] : 70 yo female has stage IIIA ([id number]) right breast multifocal invasive poorly differentiated micropapillary carcinoma, 3.7 cm and invasive well differentiated tubulo-lobular variant of ductal carcinoma, 0.8 cm, ER/OR positive, Her-2 negative, s/p right breast modified radical skin-sparing mastectomy, right ALND, left breast simple skin-sparing mastectomy and left axillary SLNB with immediate direct to implant reconstruction. 8/12 right axillary lymph nodes are positive for metastatic carcinoma with extensive extracapsular extension.\par Oncotype RS is 27. \par Staging PET/CT on 8/16/21 was negative for distant mets. \par \par The left breast has atypical lobular hyperplasia. \par \par Recommendations:\par -- Proceed with cycle #4 of adjuvant chemotherapy dose-dense AC.  \par -- Will give Neulasta injection after chemo to prevent neutropenia. \par -- Zofran and Compazine as needed for n/v.\par -- Constipation. Advised to take Colace 100 mg q8h as need and Senokot 2 tabs in the night as needed for constipation.  \par -- Order blood work prior to chemo: CBC, BMP, LFT. \par -- TTE on 9/30/21 showed normal LV Ejection Fraction of 66 %. Echo also reported moderately enlarged right ventricle, mild-moderate tricuspid regurgitation, mild to moderate pulmonic valve regurgitation. Discussed with Dr. Robert, cardiology. He compared patient's current and previous Echo. There is only mild enlarged right ventricle. Shesaw Dr. Robert and had repeat 2D Echo. Will get the copy of the report.\par -- She will start weekly Taxol in 2 weeks.\par -- Regarding to positive margin, Dr. Ferguson discussed with her and plan to have reexcision after completion of adjuvant chemotherapy. \par -- She will start endocrine therapy after chemotherapy. \par \par RTC 2 weeks for followup \par \par \par \par

## 2022-01-13 NOTE — HISTORY OF PRESENT ILLNESS
[de-identified] : JOSE FRANCISCO THOMAS is a 71 year old female PMHx of cervical cancer s/p hysterectomy and unilateral salpingo-oophorectomy,  HTN presents for newly diagnosed invasive breast cancer. \par \par Patient first palpated a mass in the RUOQ herself within the past year. She had pain at the site and feels that it is growing. She went for b/l diagnostic mammo and sonogram on 6/17/2021. Her left breast had no suspicious findings however on her right breast a spiculated mass in the RUOQ as well as a second mass superior to with a thickened axillary 2.2 cm axillary node was seen. \par -breasts are heterogeneously dense\par -R: UOQ, spiculated mass, with calcifications, c/w US finding below \par -R: Medial to this mass in the posterior depth, an area of architectural distortion. \par -R: Slightly superior to this mass, there is a an additional circumscribed mass also seen on concurrent ultrasound.\par Right breast:\par - In the axilla, at the 10 to 11:00 position 12 cm from the nipple, there is a cortically thickened axillary lymph node measuring 2.2 cm --> BIOPSY \par -Corresponding to the area palpable concern at the 10 to 11:00 position 5 to 8 cm from the nipple, there is a spiculated mass with associated coarse calcifications measuring 2.7 x 1.2 x 1.8 cm --> BIOPSY \par -At the 11:00 position 10 cm from the nipple, there is a circumscribed hypoechoic mass measuring 1.3 x 1.2 x 0.4 cm, corresponding to mammographic findings --> BIOPSY \par -At the 6:00 position 5 cm from the nipple, there is a circumscribed hypoechoic mass measuring 0.3 x 0.3 x 0.2 cm.\par -At the 6:00 position 4 cm from the nipple, there is a circumscribed hypoechoic mass measuring 0.6 x 0.6 x 0.2 cm --> BIOPSY \par BI-RADS Category 5: Highly Suggestive of Malignancy\par \par On 7/21/2021, she went for US guided biopsy of  the 2cm mass which showed invasive moderately differential ductal carcinoma, with dominant micrpapillary features\par 1. Breast, right 10-11 N5-8 mass, ultrasound guided needle core\par biopsies:\par - Invasive moderately differentiated ductal carcinoma with dominant micropapillary features, ER 99%, MO 15%. Ki67 20%, Her 2 negative (IHC 1+, Her2/CEP17 1.9)\par - Ductal carcinoma in-situ (DCIS), cribriform type with comedo\par necrosis and microcalcifications, intermediate nuclear grade.\par - Foci of lymphovascular invasion are present.\par \par 2. Breast, right 11 N10 mass, ultrasound guided needle core biopsies:\par - Benign atrophic fatty breast tissue with a dominant macrocyst wall fragment demonstrating an attenuated epithelial lining; consistent with a dilated duct.\par \par 3. Breast, right 6 N4 mass, ultrasound guided needle core biopsies:\par - Hyalinized fibroadenoma.\par \par 4. Breast, right axillary mass, ultrasound guided needle core biopsies:\par - Lymph node fragments containing metastatic carcinoma (1/1), the largest contiguous focus of which measures 6.0 mm (microscopic measurement).\par - No extracapsular extension is identified in this biopsy material.\par \par On 8/16/21, she had a PET/CT which showed pathologic FDG uptake (SUV 7) coregistering with 3.6 x 2 cm right breast mass and pathologic FDG uptake in 2 cm right axillary nodule (SUV 6.1) consistent with documented biologic tumor activity. No other sites of abnormal FDG uptake\par \par On 8/18/21, she had b/l breast MRI MRI which showed the biopsy proven right breast carcinoma 1.9 x 3.7 x 3.5 cm and 2 cm right axillary adenopathy. No additional suspicious enhancement in either breast. \par \par She saw Dr. Ferguson for surgical consultation. She was given options to have neoadjuvant chemo followed by surgery or immediate surgery. \par \par Her family history is significant for breast cancer in her paternal aunt in her 40s,  2 uncles who had "brain cancer"; and her mom who had cervical cancer. \par  [de-identified] : 10/27/21:\dharmesh Galicia is here today for followup visit. She was previously seen on 8/20/21. She was diagnosed with  IDC of the right breast with 3 cm mass and positive right axillary lymph node, ER/MA positive, Her-2 negative. Clinical stage is T3N1 with Oncotype RS 27. She was given an option to have neoadjuvant chemotherapy prior to surgery but she opted to have surgery first. On 9/29/21, she underwent right breast modified radical skin-sparing mastectomy, right ALND, left breast simple skin-sparing mastectomy and left axillary SLNB with immediate direct to implant reconstruction. \par - The right mastectomy revealed 3.7 cm invasive poorly differentiated micropapillary carcinoma, ER 85%, MA 15%, Her-2 negative (FISH ration 1.8), Ki 67 20-25% and 0.8 cm invasive well differentiated tubulo-lobular variant of ductal carcinoma, %, MA 90%, Her-2 negative and Ki 67 3-5%,  Non-extensive ductal carcinoma in-situ (DCIS), cribriform and micropapillary types with comedo necrosis and microcalcifications, intermediate to focally high nuclear grade. Numerous foci of lymphovascular invasion are present. The invasive tumor extends to focally involve (touches both ink and cautery) the blue inked surface. The nipple, skin, and deep fascial margin are negative for carcinoma. Right ALND reveal metastatic carcinoma present in eight out of twelve axillary lymph nodes (8/12) with extensive extracapsular extension seen. The largest node contains a metallic "ring-shaped" biopsy clip and shows focal prior biopsy site changes.\par AJCC 8th Edition Pathologic Stage, m(2)pT2, pN2a, pMx.\par - The left breast simple skin-sparing mastectomy show atrophic predominantly fatty breast tissue with focal atypical lobular hyperplasia (ALH). 2 sentinel lymph noses are negative for malignancy.  \par She recovered well from surgery and is here today to discuss adjuvant systemic therapy. \par \par 12/2/21\dharmesh Galicia is here today for followup visit and chemotherapy. She has stage IIIA ([id number]) right breast multifocal invasive poorly differentiated micropapillary carcinoma, 3.7 cm and invasive well differentiated tubulo-lobular variant of ductal carcinoma, 0.8 cm, ER/MA positive, Her-2 negative, s/p right breast modified radical skin-sparing mastectomy, right ALND, left breast simple skin-sparing mastectomy and left axillary SLNB with immediate implant reconstruction. 8/12 right axillary lymph nodes are positive for metastatic carcinoma with extensive extracapsular extension. Oncotype RS is 27. She is indicated for adjuvant chemotherapy.  She had port a cath placed on 11/18 to left chest wall and felt pain at port site with movement of left upper extremity. She followed up with IR regarding concerns, no evidence of port infection, or blockage/clot within port catheter. She also had HOMA done on 9/30 LV EF normal. \par \par 12/16/21\dharmesh Galicia is here today for followup visit and chemotherapy. She has stage IIIA ([id number]) right breast multifocal invasive poorly differentiated micropapillary carcinoma, 3.7 cm and invasive well differentiated tubulo-lobular variant of ductal carcinoma, 0.8 cm, ER/MA positive, Her-2 negative, s/p right breast modified radical skin-sparing mastectomy, right ALND, left breast simple skin-sparing mastectomy and left axillary SLNB with immediate implant reconstruction. 8/12 right axillary lymph nodes are positive for metastatic carcinoma with extensive extracapsular extension. Oncotype RS is 27. She started adjuvant chemotherapy with dose-dense AC. To date she received 1 cycle of AC, tolerating well. She denies nausea, vomiting, diarrhea or breast pain. She c/o that her constipation is worse from her baseline, taking Senna twice daily.\par She also had HOMA done on 9/30/21 LV EF normal. \par \par 12/30/21\dharmesh Galicia is here today for followup visit and chemotherapy. She has stage IIIA ([id number]) right breast multifocal invasive poorly differentiated micropapillary carcinoma, 3.7 cm and invasive well differentiated tubulo-lobular variant of ductal carcinoma, 0.8 cm, ER/MA positive, Her-2 negative, s/p right breast modified radical skin-sparing mastectomy, right ALND, left breast simple skin-sparing mastectomy and left axillary SLNB with immediate implant reconstruction. 8/12 right axillary lymph nodes are positive for metastatic carcinoma with extensive extracapsular extension. She started adjuvant chemotherapy with dose-dense AC. To date she received 2 cycles of AC, tolerating well. She denies nausea, vomiting, diarrhea or breast pain. Her constipation has improved with senna 3 tablets and colace. She also had HOMA done on 9/30/21 LV EF normal. \par \par 1/13/22:\dharmesh Galicia is here today for followup visit and chemotherapy. She has stage IIIA ([id number]) right breast multifocal invasive poorly differentiated micropapillary carcinoma, 3.7 cm and invasive well differentiated tubulo-lobular variant of ductal carcinoma, 0.8 cm, ER/MA positive, Her-2 negative, s/p right breast modified radical skin-sparing mastectomy, right ALND, left breast simple skin-sparing mastectomy and left axillary SLNB with immediate implant reconstruction. 8/12 right axillary lymph nodes are positive for metastatic carcinoma with extensive extracapsular extension. She has been on adjuvant chemotherapy with dose-dense AC. To date she received 3 cycles of AC, tolerating well. She saw Dr. Robert and had repeat 2D Echo.

## 2022-01-17 ENCOUNTER — NON-APPOINTMENT (OUTPATIENT)
Age: 72
End: 2022-01-17

## 2022-01-18 ENCOUNTER — NON-APPOINTMENT (OUTPATIENT)
Age: 72
End: 2022-01-18

## 2022-01-27 ENCOUNTER — APPOINTMENT (OUTPATIENT)
Dept: HEMATOLOGY ONCOLOGY | Facility: CLINIC | Age: 72
End: 2022-01-27
Payer: COMMERCIAL

## 2022-01-27 ENCOUNTER — APPOINTMENT (OUTPATIENT)
Dept: HEMATOLOGY ONCOLOGY | Facility: CLINIC | Age: 72
End: 2022-01-27

## 2022-01-27 ENCOUNTER — LABORATORY RESULT (OUTPATIENT)
Age: 72
End: 2022-01-27

## 2022-01-27 ENCOUNTER — APPOINTMENT (OUTPATIENT)
Dept: INFUSION THERAPY | Facility: CLINIC | Age: 72
End: 2022-01-27
Payer: COMMERCIAL

## 2022-01-27 ENCOUNTER — APPOINTMENT (OUTPATIENT)
Dept: INFUSION THERAPY | Facility: CLINIC | Age: 72
End: 2022-01-27

## 2022-01-27 VITALS
TEMPERATURE: 97.9 F | HEIGHT: 60 IN | DIASTOLIC BLOOD PRESSURE: 75 MMHG | BODY MASS INDEX: 24.94 KG/M2 | RESPIRATION RATE: 14 BRPM | SYSTOLIC BLOOD PRESSURE: 152 MMHG | HEART RATE: 72 BPM | WEIGHT: 127 LBS

## 2022-01-27 PROCEDURE — 99215 OFFICE O/P EST HI 40 MIN: CPT

## 2022-01-27 RX ORDER — DIPHENHYDRAMINE HCL 50 MG
50 CAPSULE ORAL ONCE
Refills: 0 | Status: COMPLETED | OUTPATIENT
Start: 2022-01-27 | End: 2022-01-27

## 2022-01-27 RX ORDER — PACLITAXEL 6 MG/ML
122 INJECTION, SOLUTION, CONCENTRATE INTRAVENOUS ONCE
Refills: 0 | Status: COMPLETED | OUTPATIENT
Start: 2022-01-27 | End: 2022-01-27

## 2022-01-27 RX ORDER — DEXAMETHASONE 0.5 MG/5ML
10 ELIXIR ORAL ONCE
Refills: 0 | Status: COMPLETED | OUTPATIENT
Start: 2022-01-27 | End: 2022-01-27

## 2022-01-27 RX ORDER — FAMOTIDINE 10 MG/ML
20 INJECTION INTRAVENOUS ONCE
Refills: 0 | Status: COMPLETED | OUTPATIENT
Start: 2022-01-27 | End: 2022-01-27

## 2022-01-27 RX ADMIN — Medication 102 MILLIGRAM(S): at 11:56

## 2022-01-27 RX ADMIN — PACLITAXEL 270.33 MILLIGRAM(S): 6 INJECTION, SOLUTION, CONCENTRATE INTRAVENOUS at 12:39

## 2022-01-27 RX ADMIN — FAMOTIDINE 104 MILLIGRAM(S): 10 INJECTION INTRAVENOUS at 11:55

## 2022-01-27 RX ADMIN — Medication 118 MILLIGRAM(S): at 11:56

## 2022-01-27 NOTE — ASSESSMENT
[FreeTextEntry1] : 72 yo female has stage IIIA ([id number]) right breast multifocal invasive poorly differentiated micropapillary carcinoma, 3.7 cm and invasive well differentiated tubulo-lobular variant of ductal carcinoma, 0.8 cm, ER/HI positive, Her-2 negative, s/p right breast modified radical skin-sparing mastectomy, right ALND, left breast simple skin-sparing mastectomy and left axillary SLNB with immediate direct to implant reconstruction. 8/12 right axillary lymph nodes are positive for metastatic carcinoma with extensive extracapsular extension.\par Oncotype RS is 27. \par Staging PET/CT on 8/16/21 was negative for distant mets. \par \par The left breast has atypical lobular hyperplasia. \par \par Recommendations:\par -- Will start weekly Taxol for total of 12 times. \par -- Reviewed side effects again which may include but not limit to bone marrow suppression, cytopenia, increased risk of infection, nausea, vomiting, diarrhea, fatigue, alopecia, infusional reaction, peripheral neuropathy. \par -- She will be given Decadron, Benadryl and Pepcid to prevent infusional reaction. \par -- CBC reviewed. Mild anemia is due to chemo. Will monitor. Leukocytosis 2/2 Neulasta. will monitor. \par -- Zofran and Compazine as needed for n/v.\par -- Constipation. Advised to take Colace 100 mg q8h as need and Senokot 2 tabs in the night as needed for constipation.  \par -- Order blood work prior to chemo:  BMP, LFT. \par -- TTE on 9/30/21 showed normal LV Ejection Fraction of 66 %. Echo also reported moderately enlarged right ventricle, mild-moderate tricuspid regurgitation, mild to moderate pulmonic valve regurgitation. Discussed with Dr. Robert, cardiology. He compared patient's current and previous Echo. There is only mild enlarged right ventricle. Shesaw Dr. Robert and had repeat 2D Echo. Will get the copy of the report.\par -- Regarding to positive margin, Dr. Ferguson discussed with her and plan to have reexcision after completion of adjuvant chemotherapy. \par -- She will start endocrine therapy after chemotherapy. \par -- RTO for f/u in 4 weeks. \par \par \par \par

## 2022-01-27 NOTE — HISTORY OF PRESENT ILLNESS
[de-identified] : JOSE FRANCISCO THOMAS is a 71 year old female PMHx of cervical cancer s/p hysterectomy and unilateral salpingo-oophorectomy,  HTN presents for newly diagnosed invasive breast cancer. \par \par Patient first palpated a mass in the RUOQ herself within the past year. She had pain at the site and feels that it is growing. She went for b/l diagnostic mammo and sonogram on 6/17/2021. Her left breast had no suspicious findings however on her right breast a spiculated mass in the RUOQ as well as a second mass superior to with a thickened axillary 2.2 cm axillary node was seen. \par -breasts are heterogeneously dense\par -R: UOQ, spiculated mass, with calcifications, c/w US finding below \par -R: Medial to this mass in the posterior depth, an area of architectural distortion. \par -R: Slightly superior to this mass, there is a an additional circumscribed mass also seen on concurrent ultrasound.\par Right breast:\par - In the axilla, at the 10 to 11:00 position 12 cm from the nipple, there is a cortically thickened axillary lymph node measuring 2.2 cm --> BIOPSY \par -Corresponding to the area palpable concern at the 10 to 11:00 position 5 to 8 cm from the nipple, there is a spiculated mass with associated coarse calcifications measuring 2.7 x 1.2 x 1.8 cm --> BIOPSY \par -At the 11:00 position 10 cm from the nipple, there is a circumscribed hypoechoic mass measuring 1.3 x 1.2 x 0.4 cm, corresponding to mammographic findings --> BIOPSY \par -At the 6:00 position 5 cm from the nipple, there is a circumscribed hypoechoic mass measuring 0.3 x 0.3 x 0.2 cm.\par -At the 6:00 position 4 cm from the nipple, there is a circumscribed hypoechoic mass measuring 0.6 x 0.6 x 0.2 cm --> BIOPSY \par BI-RADS Category 5: Highly Suggestive of Malignancy\par \par On 7/21/2021, she went for US guided biopsy of  the 2cm mass which showed invasive moderately differential ductal carcinoma, with dominant micrpapillary features\par 1. Breast, right 10-11 N5-8 mass, ultrasound guided needle core\par biopsies:\par - Invasive moderately differentiated ductal carcinoma with dominant micropapillary features, ER 99%, AR 15%. Ki67 20%, Her 2 negative (IHC 1+, Her2/CEP17 1.9)\par - Ductal carcinoma in-situ (DCIS), cribriform type with comedo\par necrosis and microcalcifications, intermediate nuclear grade.\par - Foci of lymphovascular invasion are present.\par \par 2. Breast, right 11 N10 mass, ultrasound guided needle core biopsies:\par - Benign atrophic fatty breast tissue with a dominant macrocyst wall fragment demonstrating an attenuated epithelial lining; consistent with a dilated duct.\par \par 3. Breast, right 6 N4 mass, ultrasound guided needle core biopsies:\par - Hyalinized fibroadenoma.\par \par 4. Breast, right axillary mass, ultrasound guided needle core biopsies:\par - Lymph node fragments containing metastatic carcinoma (1/1), the largest contiguous focus of which measures 6.0 mm (microscopic measurement).\par - No extracapsular extension is identified in this biopsy material.\par \par On 8/16/21, she had a PET/CT which showed pathologic FDG uptake (SUV 7) coregistering with 3.6 x 2 cm right breast mass and pathologic FDG uptake in 2 cm right axillary nodule (SUV 6.1) consistent with documented biologic tumor activity. No other sites of abnormal FDG uptake\par \par On 8/18/21, she had b/l breast MRI MRI which showed the biopsy proven right breast carcinoma 1.9 x 3.7 x 3.5 cm and 2 cm right axillary adenopathy. No additional suspicious enhancement in either breast. \par \par She saw Dr. Ferguson for surgical consultation. She was given options to have neoadjuvant chemo followed by surgery or immediate surgery. \par \par Her family history is significant for breast cancer in her paternal aunt in her 40s,  2 uncles who had "brain cancer"; and her mom who had cervical cancer. \par  [de-identified] : 10/27/21:\dharmesh Galicia is here today for followup visit. She was previously seen on 8/20/21. She was diagnosed with  IDC of the right breast with 3 cm mass and positive right axillary lymph node, ER/OR positive, Her-2 negative. Clinical stage is T3N1 with Oncotype RS 27. She was given an option to have neoadjuvant chemotherapy prior to surgery but she opted to have surgery first. On 9/29/21, she underwent right breast modified radical skin-sparing mastectomy, right ALND, left breast simple skin-sparing mastectomy and left axillary SLNB with immediate direct to implant reconstruction. \par - The right mastectomy revealed 3.7 cm invasive poorly differentiated micropapillary carcinoma, ER 85%, OR 15%, Her-2 negative (FISH ration 1.8), Ki 67 20-25% and 0.8 cm invasive well differentiated tubulo-lobular variant of ductal carcinoma, %, OR 90%, Her-2 negative and Ki 67 3-5%,  Non-extensive ductal carcinoma in-situ (DCIS), cribriform and micropapillary types with comedo necrosis and microcalcifications, intermediate to focally high nuclear grade. Numerous foci of lymphovascular invasion are present. The invasive tumor extends to focally involve (touches both ink and cautery) the blue inked surface. The nipple, skin, and deep fascial margin are negative for carcinoma. Right ALND reveal metastatic carcinoma present in eight out of twelve axillary lymph nodes (8/12) with extensive extracapsular extension seen. The largest node contains a metallic "ring-shaped" biopsy clip and shows focal prior biopsy site changes.\par AJCC 8th Edition Pathologic Stage, m(2)pT2, pN2a, pMx.\par - The left breast simple skin-sparing mastectomy show atrophic predominantly fatty breast tissue with focal atypical lobular hyperplasia (ALH). 2 sentinel lymph noses are negative for malignancy.  \par She recovered well from surgery and is here today to discuss adjuvant systemic therapy. \par \par 12/2/21\dharmesh Galicia is here today for followup visit and chemotherapy. She has stage IIIA ([id number]) right breast multifocal invasive poorly differentiated micropapillary carcinoma, 3.7 cm and invasive well differentiated tubulo-lobular variant of ductal carcinoma, 0.8 cm, ER/OR positive, Her-2 negative, s/p right breast modified radical skin-sparing mastectomy, right ALND, left breast simple skin-sparing mastectomy and left axillary SLNB with immediate implant reconstruction. 8/12 right axillary lymph nodes are positive for metastatic carcinoma with extensive extracapsular extension. Oncotype RS is 27. She is indicated for adjuvant chemotherapy.  She had port a cath placed on 11/18 to left chest wall and felt pain at port site with movement of left upper extremity. She followed up with IR regarding concerns, no evidence of port infection, or blockage/clot within port catheter. She also had HOMA done on 9/30 LV EF normal. \par \par 12/16/21\dharmesh Galicia is here today for followup visit and chemotherapy. She has stage IIIA ([id number]) right breast multifocal invasive poorly differentiated micropapillary carcinoma, 3.7 cm and invasive well differentiated tubulo-lobular variant of ductal carcinoma, 0.8 cm, ER/OR positive, Her-2 negative, s/p right breast modified radical skin-sparing mastectomy, right ALND, left breast simple skin-sparing mastectomy and left axillary SLNB with immediate implant reconstruction. 8/12 right axillary lymph nodes are positive for metastatic carcinoma with extensive extracapsular extension. Oncotype RS is 27. She started adjuvant chemotherapy with dose-dense AC. To date she received 1 cycle of AC, tolerating well. She denies nausea, vomiting, diarrhea or breast pain. She c/o that her constipation is worse from her baseline, taking Senna twice daily.\par She also had HOMA done on 9/30/21 LV EF normal. \par \par 12/30/21\dharmesh Galicia is here today for followup visit and chemotherapy. She has stage IIIA ([id number]) right breast multifocal invasive poorly differentiated micropapillary carcinoma, 3.7 cm and invasive well differentiated tubulo-lobular variant of ductal carcinoma, 0.8 cm, ER/OR positive, Her-2 negative, s/p right breast modified radical skin-sparing mastectomy, right ALND, left breast simple skin-sparing mastectomy and left axillary SLNB with immediate implant reconstruction. 8/12 right axillary lymph nodes are positive for metastatic carcinoma with extensive extracapsular extension. She started adjuvant chemotherapy with dose-dense AC. To date she received 2 cycles of AC, tolerating well. She denies nausea, vomiting, diarrhea or breast pain. Her constipation has improved with senna 3 tablets and colace. She also had HOMA done on 9/30/21 LV EF normal. \par \par 1/13/22:jossie Galicia is here today for followup visit and chemotherapy. 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 8/12 right axillary lymph nodes are positive for metastatic carcinoma with extensive extracapsular extension. She has been on adjuvant chemotherapy with dose-dense AC. To date she received 3 cycles of AC, tolerating well. She saw Dr. Robert and had repeat 2D Echo. \par \par 1/27/22:jossie Galicia is here today for followup visit and chemotherapy. She has stage IIIA ([id number]) right breast multifocal invasive poorly differentiated micropapillary carcinoma, 3.7 cm and invasive well differentiated tubulo-lobular variant of ductal carcinoma, 0.8 cm, ER/OR positive, Her-2 negative, s/p right breast modified radical skin-sparing mastectomy, right ALND, left breast simple skin-sparing mastectomy and left axillary SLNB with immediate implant reconstruction. 8/12 right axillary lymph nodes are positive for metastatic carcinoma with extensive extracapsular extension. She has been on adjuvant chemotherapy. She finished dose-dense AC x 4. She reported white coating on oral mucosa. She was given Nystatin swishing and spiting. She is feeling better now. She saw Dr. Robert and had repeat 2D Echo.

## 2022-01-27 NOTE — CONSULT LETTER
[Dear  ___] : Dear  [unfilled], [Courtesy Letter:] : I had the pleasure of seeing your patient, [unfilled], in my office today. [Please see my note below.] : Please see my note below. [Sincerely,] : Sincerely, [FreeTextEntry3] : Keyla Tejada MD [DrPatrick  ___] : Dr. EAST [DrPatrick ___] : Dr. EAST

## 2022-01-27 NOTE — PHYSICAL EXAM
[Restricted in physically strenuous activity but ambulatory and able to carry out work of a light or sedentary nature] : Status 1- Restricted in physically strenuous activity but ambulatory and able to carry out work of a light or sedentary nature, e.g., light house work, office work [Normal] : affect appropriate [de-identified] : S/P b/l mastectomy and implant reconstruction. Surgical incisions are healing well. There is no palpable abnormality.  [de-identified] : port-a-cath placed to left chest wall, healing well, Steri-Strips in place.

## 2022-02-03 ENCOUNTER — LABORATORY RESULT (OUTPATIENT)
Age: 72
End: 2022-02-03

## 2022-02-03 ENCOUNTER — APPOINTMENT (OUTPATIENT)
Dept: INFUSION THERAPY | Facility: CLINIC | Age: 72
End: 2022-02-03

## 2022-02-03 RX ORDER — PACLITAXEL 6 MG/ML
122 INJECTION, SOLUTION, CONCENTRATE INTRAVENOUS ONCE
Refills: 0 | Status: COMPLETED | OUTPATIENT
Start: 2022-02-03 | End: 2022-02-03

## 2022-02-03 RX ORDER — DIPHENHYDRAMINE HCL 50 MG
25 CAPSULE ORAL ONCE
Refills: 0 | Status: COMPLETED | OUTPATIENT
Start: 2022-02-03 | End: 2022-02-03

## 2022-02-03 RX ORDER — DEXAMETHASONE 0.5 MG/5ML
10 ELIXIR ORAL ONCE
Refills: 0 | Status: COMPLETED | OUTPATIENT
Start: 2022-02-03 | End: 2022-02-03

## 2022-02-03 RX ORDER — FAMOTIDINE 10 MG/ML
20 INJECTION INTRAVENOUS ONCE
Refills: 0 | Status: COMPLETED | OUTPATIENT
Start: 2022-02-03 | End: 2022-02-03

## 2022-02-03 RX ADMIN — Medication 101 MILLIGRAM(S): at 10:07

## 2022-02-03 RX ADMIN — FAMOTIDINE 104 MILLIGRAM(S): 10 INJECTION INTRAVENOUS at 10:07

## 2022-02-03 RX ADMIN — Medication 118 MILLIGRAM(S): at 10:07

## 2022-02-03 RX ADMIN — PACLITAXEL 270.33 MILLIGRAM(S): 6 INJECTION, SOLUTION, CONCENTRATE INTRAVENOUS at 10:14

## 2022-02-10 ENCOUNTER — APPOINTMENT (OUTPATIENT)
Dept: INFUSION THERAPY | Facility: CLINIC | Age: 72
End: 2022-02-10

## 2022-02-10 ENCOUNTER — LABORATORY RESULT (OUTPATIENT)
Age: 72
End: 2022-02-10

## 2022-02-10 RX ORDER — DIPHENHYDRAMINE HCL 50 MG
25 CAPSULE ORAL ONCE
Refills: 0 | Status: COMPLETED | OUTPATIENT
Start: 2022-02-10 | End: 2022-02-10

## 2022-02-10 RX ORDER — FAMOTIDINE 10 MG/ML
20 INJECTION INTRAVENOUS ONCE
Refills: 0 | Status: COMPLETED | OUTPATIENT
Start: 2022-02-10 | End: 2022-02-10

## 2022-02-10 RX ORDER — DEXAMETHASONE 0.5 MG/5ML
10 ELIXIR ORAL ONCE
Refills: 0 | Status: COMPLETED | OUTPATIENT
Start: 2022-02-10 | End: 2022-02-10

## 2022-02-10 RX ORDER — PACLITAXEL 6 MG/ML
122 INJECTION, SOLUTION, CONCENTRATE INTRAVENOUS ONCE
Refills: 0 | Status: COMPLETED | OUTPATIENT
Start: 2022-02-10 | End: 2022-02-10

## 2022-02-10 RX ADMIN — FAMOTIDINE 104 MILLIGRAM(S): 10 INJECTION INTRAVENOUS at 10:21

## 2022-02-10 RX ADMIN — Medication 118 MILLIGRAM(S): at 10:21

## 2022-02-10 RX ADMIN — PACLITAXEL 270.33 MILLIGRAM(S): 6 INJECTION, SOLUTION, CONCENTRATE INTRAVENOUS at 11:29

## 2022-02-10 RX ADMIN — Medication 101 MILLIGRAM(S): at 10:21

## 2022-02-14 ENCOUNTER — APPOINTMENT (OUTPATIENT)
Dept: PLASTIC SURGERY | Facility: CLINIC | Age: 72
End: 2022-02-14

## 2022-02-17 ENCOUNTER — LABORATORY RESULT (OUTPATIENT)
Age: 72
End: 2022-02-17

## 2022-02-17 ENCOUNTER — APPOINTMENT (OUTPATIENT)
Dept: INFUSION THERAPY | Facility: CLINIC | Age: 72
End: 2022-02-17

## 2022-02-17 RX ORDER — FAMOTIDINE 10 MG/ML
20 INJECTION INTRAVENOUS ONCE
Refills: 0 | Status: COMPLETED | OUTPATIENT
Start: 2022-02-17 | End: 2022-02-17

## 2022-02-17 RX ORDER — DIPHENHYDRAMINE HCL 50 MG
25 CAPSULE ORAL ONCE
Refills: 0 | Status: COMPLETED | OUTPATIENT
Start: 2022-02-17 | End: 2022-02-17

## 2022-02-17 RX ORDER — DEXAMETHASONE 0.5 MG/5ML
10 ELIXIR ORAL ONCE
Refills: 0 | Status: COMPLETED | OUTPATIENT
Start: 2022-02-17 | End: 2022-02-17

## 2022-02-17 RX ORDER — PACLITAXEL 6 MG/ML
122 INJECTION, SOLUTION, CONCENTRATE INTRAVENOUS ONCE
Refills: 0 | Status: COMPLETED | OUTPATIENT
Start: 2022-02-17 | End: 2022-02-17

## 2022-02-17 RX ADMIN — Medication 118 MILLIGRAM(S): at 10:32

## 2022-02-17 RX ADMIN — PACLITAXEL 270.33 MILLIGRAM(S): 6 INJECTION, SOLUTION, CONCENTRATE INTRAVENOUS at 11:15

## 2022-02-17 RX ADMIN — FAMOTIDINE 104 MILLIGRAM(S): 10 INJECTION INTRAVENOUS at 10:32

## 2022-02-17 RX ADMIN — Medication 101 MILLIGRAM(S): at 10:32

## 2022-02-24 ENCOUNTER — APPOINTMENT (OUTPATIENT)
Dept: HEMATOLOGY ONCOLOGY | Facility: CLINIC | Age: 72
End: 2022-02-24
Payer: COMMERCIAL

## 2022-02-24 ENCOUNTER — LABORATORY RESULT (OUTPATIENT)
Age: 72
End: 2022-02-24

## 2022-02-24 ENCOUNTER — APPOINTMENT (OUTPATIENT)
Dept: INFUSION THERAPY | Facility: CLINIC | Age: 72
End: 2022-02-24
Payer: COMMERCIAL

## 2022-02-24 VITALS
DIASTOLIC BLOOD PRESSURE: 64 MMHG | BODY MASS INDEX: 25.91 KG/M2 | HEART RATE: 69 BPM | WEIGHT: 132 LBS | HEIGHT: 60 IN | SYSTOLIC BLOOD PRESSURE: 135 MMHG | TEMPERATURE: 97.6 F

## 2022-02-24 DIAGNOSIS — Z01.818 ENCOUNTER FOR OTHER PREPROCEDURAL EXAMINATION: ICD-10-CM

## 2022-02-24 PROCEDURE — 99215 OFFICE O/P EST HI 40 MIN: CPT

## 2022-02-24 RX ORDER — DIPHENHYDRAMINE HCL 50 MG
25 CAPSULE ORAL ONCE
Refills: 0 | Status: COMPLETED | OUTPATIENT
Start: 2022-02-24 | End: 2022-02-24

## 2022-02-24 RX ORDER — FAMOTIDINE 10 MG/ML
20 INJECTION INTRAVENOUS ONCE
Refills: 0 | Status: COMPLETED | OUTPATIENT
Start: 2022-02-24 | End: 2022-02-24

## 2022-02-24 RX ORDER — DEXAMETHASONE 0.5 MG/5ML
8 ELIXIR ORAL ONCE
Refills: 0 | Status: COMPLETED | OUTPATIENT
Start: 2022-02-24 | End: 2022-02-24

## 2022-02-24 RX ORDER — PACLITAXEL 6 MG/ML
122 INJECTION, SOLUTION, CONCENTRATE INTRAVENOUS ONCE
Refills: 0 | Status: COMPLETED | OUTPATIENT
Start: 2022-02-24 | End: 2022-02-24

## 2022-02-24 RX ADMIN — Medication 101 MILLIGRAM(S): at 11:03

## 2022-02-24 RX ADMIN — Medication 117.6 MILLIGRAM(S): at 11:02

## 2022-02-24 RX ADMIN — FAMOTIDINE 104 MILLIGRAM(S): 10 INJECTION INTRAVENOUS at 11:03

## 2022-02-24 RX ADMIN — PACLITAXEL 270.33 MILLIGRAM(S): 6 INJECTION, SOLUTION, CONCENTRATE INTRAVENOUS at 11:58

## 2022-03-01 PROBLEM — Z01.818 EXAMINATION PRIOR TO CHEMOTHERAPY: Status: ACTIVE | Noted: 2021-12-30

## 2022-03-01 NOTE — ASSESSMENT
[FreeTextEntry1] : 72 yo female has stage IIIA ([id number]) right breast multifocal invasive poorly differentiated micropapillary carcinoma, 3.7 cm and invasive well differentiated tubulo-lobular variant of ductal carcinoma, 0.8 cm, ER/OH positive, Her-2 negative, s/p right breast modified radical skin-sparing mastectomy, right ALND, left breast simple skin-sparing mastectomy and left axillary SLNB with immediate direct to implant reconstruction. 8/12 right axillary lymph nodes are positive for metastatic carcinoma with extensive extracapsular extension.\par Oncotype RS is 27. \par Staging PET/CT on 8/16/21 was negative for distant mets. \par \par The left breast has atypical lobular hyperplasia. \par \par Assessment and Plan:\par -- Proceed with weekly Taxol for total of 12 times. Today is 5/12.\par --The side effects of chemo were checked and  reviewed again. \par -- CBC reviewed. Blood counts are adequate for chemo. Mild anemia an d leukopenia are idue to chemo. Will monitor.\par -- Zofran and Compazine as needed for n/v.\par -- Order labwork prior to chemo:  BMP, LFT, UA, Urine C&S.\par -- TTE on 9/30/21 showed normal LV Ejection Fraction of 66 %. Echo also reported moderately enlarged right ventricle, mild-moderate tricuspid regurgitation, mild to moderate pulmonic valve regurgitation. Discussed with Dr. Robert, cardiology. He compared patient's current and previous Echo. There is only mild enlarged right ventricle. She saw Dr. Robert and had repeat 2D Echo. \par -- Regarding to positive margin, Dr. Ferguson discussed with her and plan to have reexcision after completion of adjuvant chemotherapy. \par -- She will start endocrine therapy after chemotherapy. \par -- Keep appt with ENT 2/25/22 for evaluation of hearing loss.\par -- RTO for f/u in 4 weeks. \par \par Case was seen and discussed with Dr. Tejada who agreed with the assessment and plan.\par \par

## 2022-03-01 NOTE — PHYSICAL EXAM
[Restricted in physically strenuous activity but ambulatory and able to carry out work of a light or sedentary nature] : Status 1- Restricted in physically strenuous activity but ambulatory and able to carry out work of a light or sedentary nature, e.g., light house work, office work [Normal] : affect appropriate [de-identified] : S/P b/l mastectomy and implant reconstruction. Surgical incisions are healing well. There is no palpable abnormality.  [de-identified] : port-a-cath placed to left chest wall, healing well, Steri-Strips in place.

## 2022-03-01 NOTE — HISTORY OF PRESENT ILLNESS
[de-identified] : JOSE FRANCISCO THOMAS is a 71 year old female PMHx of cervical cancer s/p hysterectomy and unilateral salpingo-oophorectomy,  HTN presents for newly diagnosed invasive breast cancer. \par \par Patient first palpated a mass in the RUOQ herself within the past year. She had pain at the site and feels that it is growing. She went for b/l diagnostic mammo and sonogram on 6/17/2021. Her left breast had no suspicious findings however on her right breast a spiculated mass in the RUOQ as well as a second mass superior to with a thickened axillary 2.2 cm axillary node was seen. \par -breasts are heterogeneously dense\par -R: UOQ, spiculated mass, with calcifications, c/w US finding below \par -R: Medial to this mass in the posterior depth, an area of architectural distortion. \par -R: Slightly superior to this mass, there is a an additional circumscribed mass also seen on concurrent ultrasound.\par Right breast:\par - In the axilla, at the 10 to 11:00 position 12 cm from the nipple, there is a cortically thickened axillary lymph node measuring 2.2 cm --> BIOPSY \par -Corresponding to the area palpable concern at the 10 to 11:00 position 5 to 8 cm from the nipple, there is a spiculated mass with associated coarse calcifications measuring 2.7 x 1.2 x 1.8 cm --> BIOPSY \par -At the 11:00 position 10 cm from the nipple, there is a circumscribed hypoechoic mass measuring 1.3 x 1.2 x 0.4 cm, corresponding to mammographic findings --> BIOPSY \par -At the 6:00 position 5 cm from the nipple, there is a circumscribed hypoechoic mass measuring 0.3 x 0.3 x 0.2 cm.\par -At the 6:00 position 4 cm from the nipple, there is a circumscribed hypoechoic mass measuring 0.6 x 0.6 x 0.2 cm --> BIOPSY \par BI-RADS Category 5: Highly Suggestive of Malignancy\par \par On 7/21/2021, she went for US guided biopsy of  the 2cm mass which showed invasive moderately differential ductal carcinoma, with dominant micrpapillary features\par 1. Breast, right 10-11 N5-8 mass, ultrasound guided needle core\par biopsies:\par - Invasive moderately differentiated ductal carcinoma with dominant micropapillary features, ER 99%, SD 15%. Ki67 20%, Her 2 negative (IHC 1+, Her2/CEP17 1.9)\par - Ductal carcinoma in-situ (DCIS), cribriform type with comedo\par necrosis and microcalcifications, intermediate nuclear grade.\par - Foci of lymphovascular invasion are present.\par \par 2. Breast, right 11 N10 mass, ultrasound guided needle core biopsies:\par - Benign atrophic fatty breast tissue with a dominant macrocyst wall fragment demonstrating an attenuated epithelial lining; consistent with a dilated duct.\par \par 3. Breast, right 6 N4 mass, ultrasound guided needle core biopsies:\par - Hyalinized fibroadenoma.\par \par 4. Breast, right axillary mass, ultrasound guided needle core biopsies:\par - Lymph node fragments containing metastatic carcinoma (1/1), the largest contiguous focus of which measures 6.0 mm (microscopic measurement).\par - No extracapsular extension is identified in this biopsy material.\par \par On 8/16/21, she had a PET/CT which showed pathologic FDG uptake (SUV 7) coregistering with 3.6 x 2 cm right breast mass and pathologic FDG uptake in 2 cm right axillary nodule (SUV 6.1) consistent with documented biologic tumor activity. No other sites of abnormal FDG uptake\par \par On 8/18/21, she had b/l breast MRI MRI which showed the biopsy proven right breast carcinoma 1.9 x 3.7 x 3.5 cm and 2 cm right axillary adenopathy. No additional suspicious enhancement in either breast. \par \par She saw Dr. Ferguson for surgical consultation. She was given options to have neoadjuvant chemo followed by surgery or immediate surgery. \par \par Her family history is significant for breast cancer in her paternal aunt in her 40s,  2 uncles who had "brain cancer"; and her mom who had cervical cancer. \par  [de-identified] : 10/27/21:\dharmesh Galicia is here today for followup visit. She was previously seen on 8/20/21. She was diagnosed with  IDC of the right breast with 3 cm mass and positive right axillary lymph node, ER/OH positive, Her-2 negative. Clinical stage is T3N1 with Oncotype RS 27. She was given an option to have neoadjuvant chemotherapy prior to surgery but she opted to have surgery first. On 9/29/21, she underwent right breast modified radical skin-sparing mastectomy, right ALND, left breast simple skin-sparing mastectomy and left axillary SLNB with immediate direct to implant reconstruction. \par - The right mastectomy revealed 3.7 cm invasive poorly differentiated micropapillary carcinoma, ER 85%, OH 15%, Her-2 negative (FISH ration 1.8), Ki 67 20-25% and 0.8 cm invasive well differentiated tubulo-lobular variant of ductal carcinoma, %, OH 90%, Her-2 negative and Ki 67 3-5%,  Non-extensive ductal carcinoma in-situ (DCIS), cribriform and micropapillary types with comedo necrosis and microcalcifications, intermediate to focally high nuclear grade. Numerous foci of lymphovascular invasion are present. The invasive tumor extends to focally involve (touches both ink and cautery) the blue inked surface. The nipple, skin, and deep fascial margin are negative for carcinoma. Right ALND reveal metastatic carcinoma present in eight out of twelve axillary lymph nodes (8/12) with extensive extracapsular extension seen. The largest node contains a metallic "ring-shaped" biopsy clip and shows focal prior biopsy site changes.\par AJCC 8th Edition Pathologic Stage, m(2)pT2, pN2a, pMx.\par - The left breast simple skin-sparing mastectomy show atrophic predominantly fatty breast tissue with focal atypical lobular hyperplasia (ALH). 2 sentinel lymph noses are negative for malignancy.  \par She recovered well from surgery and is here today to discuss adjuvant systemic therapy. \par \par 12/2/21\dharmesh Galicia is here today for followup visit and chemotherapy. She has stage IIIA ([id number]) right breast multifocal invasive poorly differentiated micropapillary carcinoma, 3.7 cm and invasive well differentiated tubulo-lobular variant of ductal carcinoma, 0.8 cm, ER/OH positive, Her-2 negative, s/p right breast modified radical skin-sparing mastectomy, right ALND, left breast simple skin-sparing mastectomy and left axillary SLNB with immediate implant reconstruction. 8/12 right axillary lymph nodes are positive for metastatic carcinoma with extensive extracapsular extension. Oncotype RS is 27. She is indicated for adjuvant chemotherapy.  She had port a cath placed on 11/18 to left chest wall and felt pain at port site with movement of left upper extremity. She followed up with IR regarding concerns, no evidence of port infection, or blockage/clot within port catheter. She also had HOMA done on 9/30 LV EF normal. \par \par 12/16/21\dharmesh Galicia is here today for followup visit and chemotherapy. She has stage IIIA ([id number]) right breast multifocal invasive poorly differentiated micropapillary carcinoma, 3.7 cm and invasive well differentiated tubulo-lobular variant of ductal carcinoma, 0.8 cm, ER/OH positive, Her-2 negative, s/p right breast modified radical skin-sparing mastectomy, right ALND, left breast simple skin-sparing mastectomy and left axillary SLNB with immediate implant reconstruction. 8/12 right axillary lymph nodes are positive for metastatic carcinoma with extensive extracapsular extension. Oncotype RS is 27. She started adjuvant chemotherapy with dose-dense AC. To date she received 1 cycle of AC, tolerating well. She denies nausea, vomiting, diarrhea or breast pain. She c/o that her constipation is worse from her baseline, taking Senna twice daily.\par She also had HOMA done on 9/30/21 LV EF normal. \par \par 12/30/21\dharmesh Galicia is here today for followup visit and chemotherapy. She has stage IIIA ([id number]) right breast multifocal invasive poorly differentiated micropapillary carcinoma, 3.7 cm and invasive well differentiated tubulo-lobular variant of ductal carcinoma, 0.8 cm, ER/OH positive, Her-2 negative, s/p right breast modified radical skin-sparing mastectomy, right ALND, left breast simple skin-sparing mastectomy and left axillary SLNB with immediate implant reconstruction. 8/12 right axillary lymph nodes are positive for metastatic carcinoma with extensive extracapsular extension. She started adjuvant chemotherapy with dose-dense AC. To date she received 2 cycles of AC, tolerating well. She denies nausea, vomiting, diarrhea or breast pain. Her constipation has improved with senna 3 tablets and colace. She also had HOMA done on 9/30/21 LV EF normal. \par \par 1/13/22:jossie Galicia is here today for followup visit and chemotherapy. She has stage IIIA ([id number]) right breast multifocal invasive poorly differentiated micropapillary carcinoma, 3.7 cm and invasive well differentiated tubulo-lobular variant of ductal carcinoma, 0.8 cm, ER/OH positive, Her-2 negative, s/p right breast modified radical skin-sparing mastectomy, right ALND, left breast simple skin-sparing mastectomy and left axillary SLNB with immediate implant reconstruction. 8/12 right axillary lymph nodes are positive for metastatic carcinoma with extensive extracapsular extension. She has been on adjuvant chemotherapy with dose-dense AC. To date she received 3 cycles of AC, tolerating well. She saw Dr. Robert and had repeat 2D Echo. \par \par 1/27/22:jossie Galicia is here today for followup visit and chemotherapy. She has stage IIIA ([id number]) right breast multifocal invasive poorly differentiated micropapillary carcinoma, 3.7 cm and invasive well differentiated tubulo-lobular variant of ductal carcinoma, 0.8 cm, ER/OH positive, Her-2 negative, s/p right breast modified radical skin-sparing mastectomy, right ALND, left breast simple skin-sparing mastectomy and left axillary SLNB with immediate implant reconstruction. 8/12 right axillary lymph nodes are positive for metastatic carcinoma with extensive extracapsular extension. She has been on adjuvant chemotherapy. She finished dose-dense AC x 4. She reported white coating on oral mucosa. She was given Nystatin swishing and spiting. She is feeling better now. She saw Dr. Robert and had repeat 2D Echo. \par \par 2/24/22jossie Galicia is here today for followup visit and chemotherapy. She has stage IIIA ([id number]) right breast multifocal invasive poorly differentiated micropapillary carcinoma, 3.7 cm and invasive well differentiated tubulo-lobular variant of ductal carcinoma, 0.8 cm, ER/OH positive, Her-2 negative, s/p right breast modified radical skin-sparing mastectomy, right ALND, left breast simple skin-sparing mastectomy and left axillary SLNB with immediate implant reconstruction. 8/12 right axillary lymph nodes are positive for metastatic carcinoma with extensive extracapsular extension. She has been on adjuvant chemotherapy. She finished dose-dense AC x 4. She is currently on weekly Taxol. Today, she is scheduled for 5/12 Taxol.  She c/o bloatedness, hearing loss, anxiety, nervousness, feeling agitated.  She is not happy about gaining 5 lbs in 1 month.  Denies fever, N/V/C/D, paresthesias.

## 2022-03-03 ENCOUNTER — APPOINTMENT (OUTPATIENT)
Dept: INTERVENTIONAL RADIOLOGY/VASCULAR | Facility: CLINIC | Age: 72
End: 2022-03-03
Payer: MEDICARE

## 2022-03-03 ENCOUNTER — APPOINTMENT (OUTPATIENT)
Dept: INFUSION THERAPY | Facility: CLINIC | Age: 72
End: 2022-03-03

## 2022-03-03 ENCOUNTER — LABORATORY RESULT (OUTPATIENT)
Age: 72
End: 2022-03-03

## 2022-03-03 PROCEDURE — 99212 OFFICE O/P EST SF 10 MIN: CPT

## 2022-03-03 RX ORDER — PACLITAXEL 6 MG/ML
122 INJECTION, SOLUTION, CONCENTRATE INTRAVENOUS ONCE
Refills: 0 | Status: COMPLETED | OUTPATIENT
Start: 2022-03-03 | End: 2022-03-03

## 2022-03-03 RX ORDER — FAMOTIDINE 10 MG/ML
20 INJECTION INTRAVENOUS ONCE
Refills: 0 | Status: COMPLETED | OUTPATIENT
Start: 2022-03-03 | End: 2022-03-03

## 2022-03-03 RX ORDER — DIPHENHYDRAMINE HCL 50 MG
25 CAPSULE ORAL ONCE
Refills: 0 | Status: COMPLETED | OUTPATIENT
Start: 2022-03-03 | End: 2022-03-03

## 2022-03-03 RX ORDER — DEXAMETHASONE 0.5 MG/5ML
8 ELIXIR ORAL ONCE
Refills: 0 | Status: COMPLETED | OUTPATIENT
Start: 2022-03-03 | End: 2022-03-03

## 2022-03-03 RX ADMIN — FAMOTIDINE 104 MILLIGRAM(S): 10 INJECTION INTRAVENOUS at 11:18

## 2022-03-03 RX ADMIN — Medication 101 MILLIGRAM(S): at 11:18

## 2022-03-03 RX ADMIN — Medication 117.6 MILLIGRAM(S): at 11:18

## 2022-03-03 RX ADMIN — PACLITAXEL 270.33 MILLIGRAM(S): 6 INJECTION, SOLUTION, CONCENTRATE INTRAVENOUS at 11:53

## 2022-03-04 ENCOUNTER — APPOINTMENT (OUTPATIENT)
Dept: PLASTIC SURGERY | Facility: CLINIC | Age: 72
End: 2022-03-04

## 2022-03-05 ENCOUNTER — LABORATORY RESULT (OUTPATIENT)
Age: 72
End: 2022-03-05

## 2022-03-08 ENCOUNTER — OUTPATIENT (OUTPATIENT)
Dept: OUTPATIENT SERVICES | Facility: HOSPITAL | Age: 72
LOS: 1 days | Discharge: HOME | End: 2022-03-08
Payer: COMMERCIAL

## 2022-03-08 ENCOUNTER — APPOINTMENT (OUTPATIENT)
Dept: INTERVENTIONAL RADIOLOGY/VASCULAR | Facility: CLINIC | Age: 72
End: 2022-03-08

## 2022-03-08 ENCOUNTER — RESULT REVIEW (OUTPATIENT)
Age: 72
End: 2022-03-08

## 2022-03-08 DIAGNOSIS — Z90.49 ACQUIRED ABSENCE OF OTHER SPECIFIED PARTS OF DIGESTIVE TRACT: Chronic | ICD-10-CM

## 2022-03-08 DIAGNOSIS — Z90.710 ACQUIRED ABSENCE OF BOTH CERVIX AND UTERUS: Chronic | ICD-10-CM

## 2022-03-08 DIAGNOSIS — Z90.13 ACQUIRED ABSENCE OF BILATERAL BREASTS AND NIPPLES: Chronic | ICD-10-CM

## 2022-03-08 PROCEDURE — 36598 INJ W/FLUOR EVAL CV DEVICE: CPT

## 2022-03-08 NOTE — PROGRESS NOTE ADULT - SUBJECTIVE AND OBJECTIVE BOX
Interventional Radiology Outpatient Documentation    PREOPERATIVE DAY OF PROCEDURE EVALUATION:     I have personally seen and examined this patient.     Plan is for image-guided port check.    Procedure/ risks/ benefits/ goals/ alternatives were explained. All questions answered. Informed content obtained from patient. Consent placed in chart.     POSTOPERATIVE PROCEDURAL EVALUATION:     Procedure:  Image-guided port check    Pre-Op Diagnosis: History of breast cancer and complaints of swelling around her left neck near her port    Post-Op Diagnosis:  Same    Attending: Rosales  Resident: None    Anesthesia (type):  [ ] General Anesthesia  [ ] Sedation  [ ] Spinal Anesthesia  [ x] Local/Regional    Contrast: 10 cc    Estimated Blood Loss: Minimal    Condition:   [ ] Critical  [ ] Serious  [ ] Fair   [x] Good    Findings/Follow up Plan of Care:  Port in good position without any leakage of contrast around the catheter.  Rapid filling of contrast into the right atrium.  Ok to use immediately.  Patient advised to follow up with breast surgeon regarding potential leaking breast implant.      See full report in pacs    Complications: None    Disposition: Recovery

## 2022-03-10 ENCOUNTER — LABORATORY RESULT (OUTPATIENT)
Age: 72
End: 2022-03-10

## 2022-03-10 ENCOUNTER — APPOINTMENT (OUTPATIENT)
Dept: INFUSION THERAPY | Facility: CLINIC | Age: 72
End: 2022-03-10

## 2022-03-10 RX ORDER — PACLITAXEL 6 MG/ML
122 INJECTION, SOLUTION, CONCENTRATE INTRAVENOUS ONCE
Refills: 0 | Status: COMPLETED | OUTPATIENT
Start: 2022-03-10 | End: 2022-03-10

## 2022-03-10 RX ORDER — FAMOTIDINE 10 MG/ML
20 INJECTION INTRAVENOUS ONCE
Refills: 0 | Status: COMPLETED | OUTPATIENT
Start: 2022-03-10 | End: 2022-03-10

## 2022-03-10 RX ORDER — DEXAMETHASONE 0.5 MG/5ML
8 ELIXIR ORAL ONCE
Refills: 0 | Status: COMPLETED | OUTPATIENT
Start: 2022-03-10 | End: 2022-03-10

## 2022-03-10 RX ORDER — DIPHENHYDRAMINE HCL 50 MG
25 CAPSULE ORAL ONCE
Refills: 0 | Status: COMPLETED | OUTPATIENT
Start: 2022-03-10 | End: 2022-03-10

## 2022-03-10 RX ADMIN — Medication 101 MILLIGRAM(S): at 11:01

## 2022-03-10 RX ADMIN — FAMOTIDINE 104 MILLIGRAM(S): 10 INJECTION INTRAVENOUS at 11:01

## 2022-03-10 RX ADMIN — Medication 117.6 MILLIGRAM(S): at 11:01

## 2022-03-10 RX ADMIN — PACLITAXEL 270.33 MILLIGRAM(S): 6 INJECTION, SOLUTION, CONCENTRATE INTRAVENOUS at 11:35

## 2022-03-14 ENCOUNTER — APPOINTMENT (OUTPATIENT)
Dept: PLASTIC SURGERY | Facility: CLINIC | Age: 72
End: 2022-03-14
Payer: MEDICARE

## 2022-03-14 PROCEDURE — 99212 OFFICE O/P EST SF 10 MIN: CPT

## 2022-03-14 NOTE — PHYSICAL EXAM
[de-identified] : No swelling or redness at the port site [Restricted in physically strenuous activity but ambulatory and able to carry out work of a light or sedentary nature] : Restricted in physically strenuous activity but ambulatory and able to carry out work of a light or sedentary nature, e.g., light house work, office work

## 2022-03-14 NOTE — HISTORY OF PRESENT ILLNESS
[FreeTextEntry1] : Pt is a 72 y/o post menopausal F, , with PMH of scoliosis, HTN, Cervical CA s/p hysterectomy, and newly diagnosed RIGHT breast cancer who presents for breast reconstruction consultation. Pt states she self-palpated a lesion in February and underwent mammogram/US followed by biopsy confirming IDC and DCIS as well as right axillary +LN. Had not had a mammogram since age 32. Denies nipple bleeding, discharge, or inversion. She has decided to pursue bilateral mastectomy.   Dr. Ferguson recommend [facility].\par \par Per Dr. Tejada, will need either adjuvant or neoadjuvant chemotherapy. Pt has elected to do adjuvant chemotherapy.\par Pt states she would like to least amount of surgery possible for her reconstruction.\par \par H/o right lumpectomy x2 : benign nodules\par +family h/o breast cancer: paternal aunt, dx in her 40s\par \par Ht 5' Wt 128 lb  \par Current bra size: 34B, happy at this size\par Denies h/o VTE or MRSA infections\par Occ: Works at [employer]\par Here today with her daughter Sierra\par \par Interval hx (10/11/21): Pt presents today POD #12 s/p bilateral MRM and SLNB with immediate direct to implant reconstruction: left prepectoral, right subpectoral. Pt went to ED after discharge for increased left breast drain output and LLE swelling. Duplex negative. Now feeling better. Drain output: 1 (R IMF): /, 2 (R SL): , 3 (L SL): 10/7/7, 4 (L IMF): 10/5/5\par \par Interval hx (10/15/21):  Pt presents today POD #16 s/p bilateral MRM and SLNB with immediate direct to implant reconstruction: left prepectoral, right subpectoral. Taking PO abx as prescribed. C/o warmth to BL breasts but denies f/c or worsening pain or redness. Drain output: R- /5, L- 5//75\par \par Interval hx (10/25/21):  Here POD#26 s/p R MRM and SLNB with immediate direct to implant reconstruction; left pre-pectoral and right subpectoral.   left 15/10/8.  Denies fevers, chills, redness\par \par Interval hx (21):  Patient presents today POD#34 s/p B/L MRM and SLNB with immediate direct to implant reconstruction; left pre-pectoral and right subpectoral. Pt is doing better and reporting improvement in breast discomfort and swelling. Taking Gabapentin and Tylenol. Denies any f/c or drainage from incisions. \par \par Interval hx (11/15/21): 7 weeks s/p BL MRM and SLNB with immediate direct to implant reconstruction; left pre-pectoral and right subpectoral.  pt unhappy with asymmetry.  pt mostly unhappy 2/2 burning pain on left breast and stabbing pain at right IMF.   Pt to undergo chemotherapy and right breast XRT.\par \par Interval hx (3/14/22): 6 months s/p BL MRM and SLNB with immediate direct to implant reconstruction; left pre-pectoral and right subpectoral. Completed AC x4 and is now on weekly Taxol which she is tolerating much better. Would like to downsize implants to a "B" cup; also requesting left breast implant be placed retropectorally for symmetry. Concerned for left implant rupture due to change in appearance.

## 2022-03-14 NOTE — ASSESSMENT
[FreeTextEntry1] : 70 y/o F with newly diagnosed right breast IDC/DCIS and +axillary LN who has elected to undergo bilateral mastectomy\par \par 6 months s/p bilateral MRM and SLNB with immediate direct to implant reconstruction: left prepectoral, right subpectoral.  Excellent breast reconstruction appearance\par \par -Patient reassured; no evidence of left silicone implant rupture.  \par -Implant massage demonstrated\par -Discussed re-excision of positive margin with Dr. Ferguson after completion of adjuvant chemotherapy\par -Discussed options for implant downsizing after healed from re-excision and completion of chemo-XRT.  Usually 1 year after right breast XRT to account for volume changes\par -Continue f/u with [facility]\par -Offered OT rx; pt prefers to do on her own at home\par -Follow up in 1 month or sooner if re-excision by Breast with concurrent reconstruction revision needed\par \par Photos taken with patient's permission\par \par \par Due to COVID-19, pre-visit patient instructions were explained to the patient and their symptoms were checked upon arrival. Masks were used by the healthcare provider and staff and the examination room was cleaned after the patient visit concluded\par

## 2022-03-14 NOTE — HISTORY OF PRESENT ILLNESS
[FreeTextEntry1] : 71 year-old female with history of right breast cancer and a left chemotherapy port placed in November 2021.  Port has been working fine.  After her last chemo session she noticed some swelling her in left neck so she presents today for evaluation.  Swelling not very evident on exam.  No redness or tenderness at the port site.

## 2022-03-14 NOTE — ASSESSMENT
[FreeTextEntry1] : 71 year-old female with history of right breast cancer and a left chemotherapy port with complaints of swelling in her left neck area after her last chemotherapy session.\par \par \par Plan:\par \par Will schedule for a port check to confirm that port is ok to use.  She also feels like her left breast implant is leaking and was advised to follow up with her breast surgeon.

## 2022-03-14 NOTE — DATA REVIEWED
[FreeTextEntry1] : Pathology             Final\par \par No Documents Attached\par \par \par   [facility] Accession Number : [id number]\par Patient:   JOSE FRANCISCO THOMAS\par \par \par Accession:                             [id number]\par \par Collected Date/Time:                   9/29/2021 09:11 EDT\par Received Date/Time:                    9/29/2021 09:32 EDT\par \par Addendum Report - Auth (Verified)\par \par Addendum\par \par SURGICAL PATHOLOGY CANCER CASE SUMMARY\par \par INVASIVE CARCINOMA OF THE BREAST\par \par PROCEDURE: Total mastectomy\par SPECIMEN LATERALITY: Right\par TUMOR SITE: INVASIVE CARCINOMA: Upper outer quadrant\par TUMOR SIZE: 37 mm\par HISTOLOGIC TYPE: Invasive carcinoma, tubulo-lobular variant\par Invasive micropapillary carcinoma\par HISTOLOGIC GRADE (Richard Histologic Score)\par GLANDULAR (ACINAR)/TUBULAR DIFFERENTIATION: Score 2\par NUCLEAR PLEOMORPHISM: Score 3\par MITOTIC RATE: Score 3\par OVERALL GRADE: Grade 3\par TUMOR FOCALITY: Multiple foci of invasive carcinoma\par Number of foci: 2\par Sizes of individual foci: 37 mm, 8 mm\par DUCTAL CARCINOMA IN SITU (DCIS): Present, negative for EIC\par ARCHITECTURAL PATTERNS: Comedo, cribriform, micropapillary\par NUCLEAR GRADE: Grade II (intermediate) to Grade III (high)\par \par NECROSIS: Present\par LOBULAR CARCINOMA IN SITU (LCIS): No LCIS in specimen\par MARGINS\par INVASIVE CARCINOMA: Uninvolved by invasive carcinoma\par DCIS: Uninvolved by DCIS\par REGIONAL LYMPH NODES: Involved by tumor cells\par Number of lymph nodes with macrometastases (>2 mm): 8\par Number of lymph nodes with micrometastases (>0.2 mm to 2 mm  and/or\par >200 cells): 0\par Number of lymph nodes with isolated tumor ells (   <0.2 mm and  <200\par cells): 0\par Extranodal extension: Present\par Number of lymph nodes examined: 12\par Number of sentinel nodes examined: 0\par TREATMENT EFFECT: No known presurgical therapy\par LYMPHOVASCULAR INVASION: Present\par DERMAL LYMPHOVASCULAR INVASION: Present\par PATHOLOGIC STAGING\par TNM Descriptors: m (multiple foci of invasive carcinoma\par PRIMARY TUMOR: pT2: Tumor >20 mm but    <50 mm in greatest dimension\par REGIONAL LYMPH NODES: pN2a: Metastases in 4 to 9 axillary lymph\par nodes\par ADDITIONAL PATHOLOGIC FINDINGS: Fibroadenoma\par MICROCALCIFICATIONS: Present in DCIS\par CLINICAL HISTORY: Radiologic finding: mass or architectural distortion\par BREAST BIOMARKER REPORTING: Pending\par \par Verified by: Zan Baker M.D.\par (Electronic Signature)\par Reported on: 10/06/21 12:52 EDT, [address], [place] [address]\par Phone: [phone]   Fax: [phone]\par _________________________________________________________________\par Surgical Pathology Report - Auth (Verified)\par \par Specimen(s) Submitted\par 1  Left breast\par 2  Left breast sentinel lymph node #1\par 3  Left breast sentinel lymph node #2\par 4  Right breast\par 5  Right axillary contents\par \par Final Diagnosis\par 1. Breast, left, simple skin-sparing mastectomy:\par - Atrophic predominantly fatty breast tissue with focal atypical lobular\par hyperplasia (ALH), a small hyalinized fibroadenoma, and proliferative\par type fibrocystic changes associated with microcalcifications.\par - Unremarkable nipple, skin, and deep fascial margin.\par \par \par 2. Breast, left axillary sentinel lymph node #1, biopsy:\par -  One benign lymph node (0/1).  Negative for carcinoma with evaluation\par of multiple H T E levels and with negative    immunohistochemical  stains\par for  cytokeratins (CK AE1/AE3 and CK 8/18).\par \par 3. Breast, left axillary sentinel lymph node #2. biopsy:\par -  One benign lymph node (0/1).  Negative for carcinoma with evaluation\par of multiple H T E levels and with negative    immunohistochemical  stains\par for  cytokeratins (CK AE1/AE3 and CK 8/18).\par \par 4. Breast, right, modified radical skin-sparing mastectomy:\par - Upper outer quadrant healing prior biopsy site changes with multifocal\par invasive poorly differentiated micropapillary carcinoma, 3.7 cm\par (radiographic measurement), and invasive well differentiated tubulo-\par lobular variant of ductal carcinoma, 0.8 cm (microscopic measurement);\par both associated with focal necrosis.\par - Non-extensive ductal carcinoma in-situ (DCIS), cribriform and\par micropapillary types with comedo necrosis and microcalcifications,\par intermediate to focally high nuclear grade.\par - Numerous foci of lymphovascular invasion are present.\par - The invasive tumor extends to focally involve (touches both ink and\par cautery) the blue inked surface.\par - The invasive tumor also extends to infiltrate an adjacent hyalinized\par fibroadenoma.\par - Atrophic predominantly fatty breast tissue with focal atypical lobular\par hyperplasia (ALH) and proliferative type fibrocystic changes.\par - Unremarkable nipple, skin, and deep fascial margin. Negative for\par carcinoma.\par -  Pending special studies for ER/MS proteins, proliferation index\par (Ki-67), and HER2/FREEMAN   oncoprotein expression and/or gene amplification,\par with results to be reported as a separate addendum.\par - AJCC 8th Edition Pathologic Stage, m(2)pT2, pN2a, pMx.\par \par 5. Breast, right axillary contents, excision:\par - Metastatic carcinoma present in eight out of twelve axillary lymph\par nodes (8/12) with extensive extracapsular extension seen.\par - The largest node contains a metallic "ring-shaped" biopsy clip and\par shows focal prior biopsy site changes.\par - Adjacent fibroadipose connective tissue containing axillary ectopic\par atrophic benign fatty breast tissue.\par \par Verified by: Zan Baker M.D.\par (Electronic Signature)\par Reported on: 10/05/21 16:17 EDT, [address] [place] [id number]\par Phone: [phone]   Fax: [phone]\par _________________________________________________________________\par \par Comment\par Case reported to Tumor Registry.\par \par \par Intraoperative Consultation\par The specimen is submitted for INTRAOPERATIVE CONSULTATION and the FROZEN\par SECTION diagnosis is reported at [address] [place] [address], , [place] [address] as:\par \par 2 One benign sentinel lymph node (0/1)\par \par 3. One benign sentinel lymph node (0/1)\par \par Received at: 9:33 am\par Verbally reported at: 10:09 am by: Dr. DILLON Baker to: Jocy (Nurse)\par \par Clinical Information\par Bilateral skin sparing mastectomy, right axillary lymph node dissection,\par left sentinel node biopsy\par \par Perioperative Diagnosis\par Right breast cancer\par \par \par Gross Description\par 1.  The specimen is received fresh, labeled "left breast suture\par marks  axillary tail" and consists of a mastectomy specimen measuring\par from superior to inferior- 17.7 cm, anterior to posterior- 6 cm, medial\par to lateral- 11.5 cm and weighing 190 gm.  Attached is an ellipse of\par skin measuring 7.2 x 3.4 x    0.3 cm with a raised nipple measuring 0.8\par cm in length and an areolar measuring 3 x 3 cm.  The skin is tan white\par and wrinkled with no lesions or masses grossly observed.  The specimen\par is oriented as follows: stitch marks the axillary tail.  The specimen\par is inked as follows: green - upper inner quadrant. blue - upper outer\par quadrant, orange - lower inner quadrant, yellow - lower outer quadrant,\par posterior - black.  The specimen is serially sectioned from medial\par to lateral.  There are no masses grossly identified. Cross sectioning\par reveals yellow slightly pink fibroadipose tissue.    Representative sections\par are submitted.\par \par Summary of Sections:\par 1A-1B - Nipple -2\par 1C-1D - Representative the upper outer quadrant -2\par 1E-1F - Representative the upper inner quadrant -2\par 1G-1H - Lower inner quadrant -2\par 1I-1J - Lower outer quadrant -2\par 1K-1L - Representative of the deep margin -2\par \par Total: 12 blocks\par \par 2.  The specimen is received fresh, labeled "left sentinel node #1" and\par consists of a fragment of   tan red lobulated  fibroadipose tissue measuring\par 2 x 1.3 x 0.8 cm. There is one lymph node identified measuring 0.9 x 0.6\par x 0.5 cm. Lymph node is bisected and entirely submitted.    (2 blocks)\par \par 3.  The specimen is received fresh, labeled "left sentinel lymph node\par #2" and consists of a fragment of reddish yellow    fibroadipose  tissue\par measuring 2 x 1.3 x 1 cm. There is one lymph node identified measuring\par 1.5 x 1 x 0.9 cm. Entire lymph node is serially sectioned.    The specimen\par is submitted entirely. (3 blocks)\par \par 4.  The specimen is received fresh, labeled "right breast stitch\par marks  axillary tail" and consists of a mastectomy specimen measuring\par superior to inferior - 8.3 cm, anterior to posterior - 3.5 cm, medial\par to lateral - 12 cm and weighing 235 gm.  There is an ellipse of skin\par measuring 6.5 x 3 x 0.3 cm. It is tan white and wrinkled.  The     areolar\par complex measures 2.5 x 2.5 cm.  The nipple raised measures 0.6 cm\par in length.  The specimen is oriented by sutures as follows; stitch\par marks  axillary tail.  The specimen is inked as follows: upper outer\par quadrant - blue, upper inner quadrant - green, lower outer quadrant -\par yellow, lower inner quadrant - orange, posterior - black.     The  specimen\par is serially sectioned from medial to lateral.  There are three masses\par that were found on sectioning.  The first mass is found in the upper\par outer quadrant measuring 2.5 x 1.4 x 1.8 cm  it is oval white firm 0.3\par cm from the deep margin.  The second mass is located in the upper outer\par \par quadrant and is tan white firm with   microcalcifications  measuring 2.5 x\par 1.5 x 1.5 cm.  It is 0.5 cm from the deep margin.     There is a third mass\par found at the upper outer quadrant it is white round firm and calcified\par measuring 2.3 x 1.6 x 1.2 cm, 0.3 cm from the deep margin.  The remainder\par of the specimen is yellow adipose tissue.  Representative sections are\par submitted.\par \par Summary of Sections:\par 4A-4B - Nipple and the areola complex -2\par 4C-4J - Tissue around first mass -8\par 4K-4R - Representative of the first mass -8\par 4S-4X - Representative of second mass -6\par 4Y-4AB - Representative of the third mass -4\par 4AC-4AD - Representative sections of the right upper inner quadrant -2\par 4AE-4EF - Representative sections of the right lower outer quadrant -2\par 4AG-4AH - Representative sections of the right upper outer quadrant -2\par 4AI- Representative sections of the right deep margin -1\par 4AJ-4AK - Representative sections of the right  lower inner quadrant -2\par \par Total: 38 blocks\par \par 5.  The specimen is received fresh, labeled "right   axillary contents"\par and consists of multiple fragments of   lobulated  fibroadipose  tissue,\par measuring 6.5 x 6.0 x 3.0 cm in aggregate and weighs 23 gm. There are\par multiple possible lymph nodes identified.    The specimen is submitted\par entirely.\par \par Summary of Sections:\par 5A - Three possible lymph nodes -1\par 5B - One lymph node serially sectioned -1\par 5C - One lymph node serially sectioned -1\par 5D - One lymph node serially sectioned -1\par 5E-5F - One lymph node serially sectioned -2\par 5G-5M - One lymph node serially sectioned (5I ring shaped clip) -7\par 5N-5W - Remaining fatty tissue -10\par \par Total blocks -  23\par \par Specimen was received and underwent gross examination at [facility], [address] [place] [address].\par \par 09/29/2021 11:30:51 EDT gm/lm\par \par  \par \par  Ordered by: DOCUMENT, SCM       Collected/Examined: 29Sep2021 09:11AM       \par Verification Not Required       Stage: Final       \par  Performed at: [facility]       Resulted: [id number] 12:52PM       Last Updated: 06Oct2021 01:52PM       Accession: [id number]

## 2022-03-14 NOTE — PHYSICAL EXAM
[de-identified] : well-appearing, NAD [de-identified] : nonlabored breathing [de-identified] : ENEDELIAR [de-identified] : Bilateral breasts implants soft and mobile, left appears slightly larger than right due to implant position (left prepectoral, right subpectoral), incisions well-healed. excellent right pec muscle expansion

## 2022-03-17 ENCOUNTER — LABORATORY RESULT (OUTPATIENT)
Age: 72
End: 2022-03-17

## 2022-03-17 ENCOUNTER — APPOINTMENT (OUTPATIENT)
Dept: INFUSION THERAPY | Facility: CLINIC | Age: 72
End: 2022-03-17

## 2022-03-17 RX ORDER — FAMOTIDINE 10 MG/ML
20 INJECTION INTRAVENOUS ONCE
Refills: 0 | Status: COMPLETED | OUTPATIENT
Start: 2022-03-17 | End: 2022-03-17

## 2022-03-17 RX ORDER — PACLITAXEL 6 MG/ML
122 INJECTION, SOLUTION, CONCENTRATE INTRAVENOUS ONCE
Refills: 0 | Status: COMPLETED | OUTPATIENT
Start: 2022-03-17 | End: 2022-03-17

## 2022-03-17 RX ORDER — DEXAMETHASONE 0.5 MG/5ML
8 ELIXIR ORAL ONCE
Refills: 0 | Status: COMPLETED | OUTPATIENT
Start: 2022-03-17 | End: 2022-03-17

## 2022-03-17 RX ORDER — DIPHENHYDRAMINE HCL 50 MG
25 CAPSULE ORAL ONCE
Refills: 0 | Status: COMPLETED | OUTPATIENT
Start: 2022-03-17 | End: 2022-03-17

## 2022-03-17 RX ADMIN — PACLITAXEL 270.33 MILLIGRAM(S): 6 INJECTION, SOLUTION, CONCENTRATE INTRAVENOUS at 10:44

## 2022-03-17 RX ADMIN — Medication 101 MILLIGRAM(S): at 10:33

## 2022-03-17 RX ADMIN — FAMOTIDINE 104 MILLIGRAM(S): 10 INJECTION INTRAVENOUS at 10:33

## 2022-03-17 RX ADMIN — Medication 117.6 MILLIGRAM(S): at 10:33

## 2022-03-22 DIAGNOSIS — Z45.2 ENCOUNTER FOR ADJUSTMENT AND MANAGEMENT OF VASCULAR ACCESS DEVICE: ICD-10-CM

## 2022-03-24 ENCOUNTER — LABORATORY RESULT (OUTPATIENT)
Age: 72
End: 2022-03-24

## 2022-03-24 ENCOUNTER — APPOINTMENT (OUTPATIENT)
Dept: INFUSION THERAPY | Facility: CLINIC | Age: 72
End: 2022-03-24
Payer: COMMERCIAL

## 2022-03-24 ENCOUNTER — APPOINTMENT (OUTPATIENT)
Dept: HEMATOLOGY ONCOLOGY | Facility: CLINIC | Age: 72
End: 2022-03-24
Payer: COMMERCIAL

## 2022-03-24 VITALS
RESPIRATION RATE: 14 BRPM | DIASTOLIC BLOOD PRESSURE: 86 MMHG | BODY MASS INDEX: 26.31 KG/M2 | HEIGHT: 60 IN | WEIGHT: 134 LBS | TEMPERATURE: 98 F | SYSTOLIC BLOOD PRESSURE: 153 MMHG | HEART RATE: 72 BPM

## 2022-03-24 PROCEDURE — 99214 OFFICE O/P EST MOD 30 MIN: CPT

## 2022-03-24 RX ORDER — DEXAMETHASONE 0.5 MG/5ML
8 ELIXIR ORAL ONCE
Refills: 0 | Status: COMPLETED | OUTPATIENT
Start: 2022-03-24 | End: 2022-03-24

## 2022-03-24 RX ORDER — PACLITAXEL 6 MG/ML
122 INJECTION, SOLUTION, CONCENTRATE INTRAVENOUS ONCE
Refills: 0 | Status: COMPLETED | OUTPATIENT
Start: 2022-03-24 | End: 2022-03-24

## 2022-03-24 RX ORDER — FAMOTIDINE 10 MG/ML
20 INJECTION INTRAVENOUS ONCE
Refills: 0 | Status: COMPLETED | OUTPATIENT
Start: 2022-03-24 | End: 2022-03-24

## 2022-03-24 RX ORDER — DIPHENHYDRAMINE HCL 50 MG
25 CAPSULE ORAL ONCE
Refills: 0 | Status: COMPLETED | OUTPATIENT
Start: 2022-03-24 | End: 2022-03-24

## 2022-03-24 RX ADMIN — PACLITAXEL 270.33 MILLIGRAM(S): 6 INJECTION, SOLUTION, CONCENTRATE INTRAVENOUS at 13:07

## 2022-03-24 RX ADMIN — Medication 117.6 MILLIGRAM(S): at 12:14

## 2022-03-24 RX ADMIN — FAMOTIDINE 104 MILLIGRAM(S): 10 INJECTION INTRAVENOUS at 12:15

## 2022-03-24 RX ADMIN — Medication 101 MILLIGRAM(S): at 12:15

## 2022-03-24 NOTE — HISTORY OF PRESENT ILLNESS
[de-identified] : JOSE FRANCISCO THOMAS is a 71 year old female PMHx of cervical cancer s/p hysterectomy and unilateral salpingo-oophorectomy,  HTN presents for newly diagnosed invasive breast cancer. \par \par Patient first palpated a mass in the RUOQ herself within the past year. She had pain at the site and feels that it is growing. She went for b/l diagnostic mammo and sonogram on 6/17/2021. Her left breast had no suspicious findings however on her right breast a spiculated mass in the RUOQ as well as a second mass superior to with a thickened axillary 2.2 cm axillary node was seen. \par -breasts are heterogeneously dense\par -R: UOQ, spiculated mass, with calcifications, c/w US finding below \par -R: Medial to this mass in the posterior depth, an area of architectural distortion. \par -R: Slightly superior to this mass, there is a an additional circumscribed mass also seen on concurrent ultrasound.\par Right breast:\par - In the axilla, at the 10 to 11:00 position 12 cm from the nipple, there is a cortically thickened axillary lymph node measuring 2.2 cm --> BIOPSY \par -Corresponding to the area palpable concern at the 10 to 11:00 position 5 to 8 cm from the nipple, there is a spiculated mass with associated coarse calcifications measuring 2.7 x 1.2 x 1.8 cm --> BIOPSY \par -At the 11:00 position 10 cm from the nipple, there is a circumscribed hypoechoic mass measuring 1.3 x 1.2 x 0.4 cm, corresponding to mammographic findings --> BIOPSY \par -At the 6:00 position 5 cm from the nipple, there is a circumscribed hypoechoic mass measuring 0.3 x 0.3 x 0.2 cm.\par -At the 6:00 position 4 cm from the nipple, there is a circumscribed hypoechoic mass measuring 0.6 x 0.6 x 0.2 cm --> BIOPSY \par BI-RADS Category 5: Highly Suggestive of Malignancy\par \par On 7/21/2021, she went for US guided biopsy of  the 2cm mass which showed invasive moderately differential ductal carcinoma, with dominant micrpapillary features\par 1. Breast, right 10-11 N5-8 mass, ultrasound guided needle core\par biopsies:\par - Invasive moderately differentiated ductal carcinoma with dominant micropapillary features, ER 99%, ND 15%. Ki67 20%, Her 2 negative (IHC 1+, Her2/CEP17 1.9)\par - Ductal carcinoma in-situ (DCIS), cribriform type with comedo\par necrosis and microcalcifications, intermediate nuclear grade.\par - Foci of lymphovascular invasion are present.\par \par 2. Breast, right 11 N10 mass, ultrasound guided needle core biopsies:\par - Benign atrophic fatty breast tissue with a dominant macrocyst wall fragment demonstrating an attenuated epithelial lining; consistent with a dilated duct.\par \par 3. Breast, right 6 N4 mass, ultrasound guided needle core biopsies:\par - Hyalinized fibroadenoma.\par \par 4. Breast, right axillary mass, ultrasound guided needle core biopsies:\par - Lymph node fragments containing metastatic carcinoma (1/1), the largest contiguous focus of which measures 6.0 mm (microscopic measurement).\par - No extracapsular extension is identified in this biopsy material.\par \par On 8/16/21, she had a PET/CT which showed pathologic FDG uptake (SUV 7) coregistering with 3.6 x 2 cm right breast mass and pathologic FDG uptake in 2 cm right axillary nodule (SUV 6.1) consistent with documented biologic tumor activity. No other sites of abnormal FDG uptake\par \par On 8/18/21, she had b/l breast MRI MRI which showed the biopsy proven right breast carcinoma 1.9 x 3.7 x 3.5 cm and 2 cm right axillary adenopathy. No additional suspicious enhancement in either breast. \par \par She saw Dr. Ferguson for surgical consultation. She was given options to have neoadjuvant chemo followed by surgery or immediate surgery. \par \par Her family history is significant for breast cancer in her paternal aunt in her 40s,  2 uncles who had "brain cancer"; and her mom who had cervical cancer. \par  [de-identified] : 10/27/21:\dharmesh Galicia is here today for followup visit. She was previously seen on 8/20/21. She was diagnosed with  IDC of the right breast with 3 cm mass and positive right axillary lymph node, ER/LA positive, Her-2 negative. Clinical stage is T3N1 with Oncotype RS 27. She was given an option to have neoadjuvant chemotherapy prior to surgery but she opted to have surgery first. On 9/29/21, she underwent right breast modified radical skin-sparing mastectomy, right ALND, left breast simple skin-sparing mastectomy and left axillary SLNB with immediate direct to implant reconstruction. \par - The right mastectomy revealed 3.7 cm invasive poorly differentiated micropapillary carcinoma, ER 85%, LA 15%, Her-2 negative (FISH ration 1.8), Ki 67 20-25% and 0.8 cm invasive well differentiated tubulo-lobular variant of ductal carcinoma, %, LA 90%, Her-2 negative and Ki 67 3-5%,  Non-extensive ductal carcinoma in-situ (DCIS), cribriform and micropapillary types with comedo necrosis and microcalcifications, intermediate to focally high nuclear grade. Numerous foci of lymphovascular invasion are present. The invasive tumor extends to focally involve (touches both ink and cautery) the blue inked surface. The nipple, skin, and deep fascial margin are negative for carcinoma. Right ALND reveal metastatic carcinoma present in eight out of twelve axillary lymph nodes (8/12) with extensive extracapsular extension seen. The largest node contains a metallic "ring-shaped" biopsy clip and shows focal prior biopsy site changes.\par AJCC 8th Edition Pathologic Stage, m(2)pT2, pN2a, pMx.\par - The left breast simple skin-sparing mastectomy show atrophic predominantly fatty breast tissue with focal atypical lobular hyperplasia (ALH). 2 sentinel lymph noses are negative for malignancy.  \par She recovered well from surgery and is here today to discuss adjuvant systemic therapy. \par \par 12/2/21\dharmesh Galicia is here today for followup visit and chemotherapy. She has stage IIIA ([id number]) right breast multifocal invasive poorly differentiated micropapillary carcinoma, 3.7 cm and invasive well differentiated tubulo-lobular variant of ductal carcinoma, 0.8 cm, ER/LA positive, Her-2 negative, s/p right breast modified radical skin-sparing mastectomy, right ALND, left breast simple skin-sparing mastectomy and left axillary SLNB with immediate implant reconstruction. 8/12 right axillary lymph nodes are positive for metastatic carcinoma with extensive extracapsular extension. Oncotype RS is 27. She is indicated for adjuvant chemotherapy.  She had port a cath placed on 11/18 to left chest wall and felt pain at port site with movement of left upper extremity. She followed up with IR regarding concerns, no evidence of port infection, or blockage/clot within port catheter. She also had HOMA done on 9/30 LV EF normal. \par \par 12/16/21\dharmesh Galicia is here today for followup visit and chemotherapy. She has stage IIIA ([id number]) right breast multifocal invasive poorly differentiated micropapillary carcinoma, 3.7 cm and invasive well differentiated tubulo-lobular variant of ductal carcinoma, 0.8 cm, ER/LA positive, Her-2 negative, s/p right breast modified radical skin-sparing mastectomy, right ALND, left breast simple skin-sparing mastectomy and left axillary SLNB with immediate implant reconstruction. 8/12 right axillary lymph nodes are positive for metastatic carcinoma with extensive extracapsular extension. Oncotype RS is 27. She started adjuvant chemotherapy with dose-dense AC. To date she received 1 cycle of AC, tolerating well. She denies nausea, vomiting, diarrhea or breast pain. She c/o that her constipation is worse from her baseline, taking Senna twice daily.\par She also had HOMA done on 9/30/21 LV EF normal. \par \par 12/30/21\dharmesh Galicia is here today for followup visit and chemotherapy. She has stage IIIA ([id number]) right breast multifocal invasive poorly differentiated micropapillary carcinoma, 3.7 cm and invasive well differentiated tubulo-lobular variant of ductal carcinoma, 0.8 cm, ER/LA positive, Her-2 negative, s/p right breast modified radical skin-sparing mastectomy, right ALND, left breast simple skin-sparing mastectomy and left axillary SLNB with immediate implant reconstruction. 8/12 right axillary lymph nodes are positive for metastatic carcinoma with extensive extracapsular extension. She started adjuvant chemotherapy with dose-dense AC. To date she received 2 cycles of AC, tolerating well. She denies nausea, vomiting, diarrhea or breast pain. Her constipation has improved with senna 3 tablets and colace. She also had HOMA done on 9/30/21 LV EF normal. \par \par 1/13/22:jossie Galicia is here today for followup visit and chemotherapy. She has stage IIIA ([id number]) right breast multifocal invasive poorly differentiated micropapillary carcinoma, 3.7 cm and invasive well differentiated tubulo-lobular variant of ductal carcinoma, 0.8 cm, ER/LA positive, Her-2 negative, s/p right breast modified radical skin-sparing mastectomy, right ALND, left breast simple skin-sparing mastectomy and left axillary SLNB with immediate implant reconstruction. 8/12 right axillary lymph nodes are positive for metastatic carcinoma with extensive extracapsular extension. She has been on adjuvant chemotherapy with dose-dense AC. To date she received 3 cycles of AC, tolerating well. She saw Dr. Robert and had repeat 2D Echo. \par \par 1/27/22:jossie Galicia is here today for followup visit and chemotherapy. She has stage IIIA ([id number]) right breast multifocal invasive poorly differentiated micropapillary carcinoma, 3.7 cm and invasive well differentiated tubulo-lobular variant of ductal carcinoma, 0.8 cm, ER/LA positive, Her-2 negative, s/p right breast modified radical skin-sparing mastectomy, right ALND, left breast simple skin-sparing mastectomy and left axillary SLNB with immediate implant reconstruction. 8/12 right axillary lymph nodes are positive for metastatic carcinoma with extensive extracapsular extension. She has been on adjuvant chemotherapy. She finished dose-dense AC x 4. She reported white coating on oral mucosa. She was given Nystatin swishing and spiting. She is feeling better now. She saw Dr. Robert and had repeat 2D Echo. \par \par 2/24/22jossie Galicia is here today for followup visit and chemotherapy. She has stage IIIA ([id number]) right breast multifocal invasive poorly differentiated micropapillary carcinoma, 3.7 cm and invasive well differentiated tubulo-lobular variant of ductal carcinoma, 0.8 cm, ER/LA positive, Her-2 negative, s/p right breast modified radical skin-sparing mastectomy, right ALND, left breast simple skin-sparing mastectomy and left axillary SLNB with immediate implant reconstruction. 8/12 right axillary lymph nodes are positive for metastatic carcinoma with extensive extracapsular extension. She has been on adjuvant chemotherapy. She finished dose-dense AC x 4. She is currently on weekly Taxol. Today, she is scheduled for 5/12 Taxol.  She c/o bloatedness, hearing loss, anxiety, nervousness, feeling agitated.  She is not happy about gaining 5 lbs in 1 month.  Denies fever, N/V/C/D, paresthesias.\par \par 3/24/22:\par Frida is here today for followup visit and chemotherapy. She has stage IIIA ([id number]) right breast multifocal invasive poorly differentiated micropapillary carcinoma, 3.7 cm and invasive well differentiated tubulo-lobular variant of ductal carcinoma, 0.8 cm, ER/LA positive, Her-2 negative, s/p right breast modified radical skin-sparing mastectomy, right ALND, left breast simple skin-sparing mastectomy and left axillary SLNB with immediate implant reconstruction. 8/12 right axillary lymph nodes are positive for metastatic carcinoma with extensive extracapsular extension. She has been on adjuvant chemotherapy. She finished dose-dense AC x 4. She is currently on weekly Taxol. Today, she is scheduled for 9/12 Taxol.  She reports chemotherapy-related nail changes.

## 2022-03-24 NOTE — ASSESSMENT
[FreeTextEntry1] : 70 yo female has stage IIIA ([id number]) right breast multifocal invasive poorly differentiated micropapillary carcinoma, 3.7 cm and invasive well differentiated tubulo-lobular variant of ductal carcinoma, 0.8 cm, ER/LA positive, Her-2 negative, s/p right breast modified radical skin-sparing mastectomy, right ALND, left breast simple skin-sparing mastectomy and left axillary SLNB with immediate direct to implant reconstruction. 8/12 right axillary lymph nodes are positive for metastatic carcinoma with extensive extracapsular extension.\par Oncotype RS is 27. \par Staging PET/CT on 8/16/21 was negative for distant mets. \par \par The left breast has atypical lobular hyperplasia. \par \par Assessment and Plan:\par -- Proceed with weekly Taxol for total of 12 times. Today is 9/12.\par --The side effects of chemo were checked and reviewed again. \par -- CBC reviewed. Blood counts are adequate for chemo. \par -- Zofran and Compazine as needed for n/v.\par -- Order lab work prior to chemo:  BMP, LFT.\par -- Grade 1 nail toxicity from chemo. Will monitor for now.\par -- TTE on 9/30/21 showed normal LV Ejection Fraction of 66 %. Echo also reported moderately enlarged right ventricle, mild-moderate tricuspid regurgitation, mild to moderate pulmonic valve regurgitation. Discussed with Dr. Robert, cardiology. He compared patient's current and previous Echo. There is only mild enlarged right ventricle. She saw Dr. Robert and had repeat 2D Echo. \par -- Regarding to positive margin, She will see Dr. Ferguson to discuss reexcision after completion of adjuvant chemotherapy. \par -- Discussed to start adjuvant endocrine therapy after chemotherapy. Letrozole is recommended. \par -- RTO for f/u in 4 weeks. \par \par \par \par

## 2022-03-24 NOTE — PHYSICAL EXAM
[Restricted in physically strenuous activity but ambulatory and able to carry out work of a light or sedentary nature] : Status 1- Restricted in physically strenuous activity but ambulatory and able to carry out work of a light or sedentary nature, e.g., light house work, office work [Normal] : affect appropriate [de-identified] : port-a-cath placed to left chest wall, healing well, Steri-Strips in place.  [de-identified] : S/P b/l mastectomy and implant reconstruction. Surgical incisions are healing well. There is no palpable abnormality.

## 2022-03-29 ENCOUNTER — NON-APPOINTMENT (OUTPATIENT)
Age: 72
End: 2022-03-29

## 2022-03-31 ENCOUNTER — APPOINTMENT (OUTPATIENT)
Dept: INFUSION THERAPY | Facility: CLINIC | Age: 72
End: 2022-03-31

## 2022-03-31 ENCOUNTER — LABORATORY RESULT (OUTPATIENT)
Age: 72
End: 2022-03-31

## 2022-03-31 RX ORDER — PACLITAXEL 6 MG/ML
122 INJECTION, SOLUTION, CONCENTRATE INTRAVENOUS ONCE
Refills: 0 | Status: COMPLETED | OUTPATIENT
Start: 2022-03-31 | End: 2022-03-31

## 2022-03-31 RX ORDER — FAMOTIDINE 10 MG/ML
20 INJECTION INTRAVENOUS ONCE
Refills: 0 | Status: COMPLETED | OUTPATIENT
Start: 2022-03-31 | End: 2022-03-31

## 2022-03-31 RX ORDER — DEXAMETHASONE 0.5 MG/5ML
8 ELIXIR ORAL ONCE
Refills: 0 | Status: COMPLETED | OUTPATIENT
Start: 2022-03-31 | End: 2022-03-31

## 2022-03-31 RX ORDER — DIPHENHYDRAMINE HCL 50 MG
25 CAPSULE ORAL ONCE
Refills: 0 | Status: COMPLETED | OUTPATIENT
Start: 2022-03-31 | End: 2022-03-31

## 2022-03-31 RX ADMIN — FAMOTIDINE 104 MILLIGRAM(S): 10 INJECTION INTRAVENOUS at 10:55

## 2022-03-31 RX ADMIN — Medication 117.6 MILLIGRAM(S): at 10:55

## 2022-03-31 RX ADMIN — PACLITAXEL 270.33 MILLIGRAM(S): 6 INJECTION, SOLUTION, CONCENTRATE INTRAVENOUS at 11:43

## 2022-03-31 RX ADMIN — Medication 101 MILLIGRAM(S): at 10:55

## 2022-04-07 ENCOUNTER — LABORATORY RESULT (OUTPATIENT)
Age: 72
End: 2022-04-07

## 2022-04-07 ENCOUNTER — APPOINTMENT (OUTPATIENT)
Dept: INFUSION THERAPY | Facility: CLINIC | Age: 72
End: 2022-04-07

## 2022-04-07 ENCOUNTER — OUTPATIENT (OUTPATIENT)
Dept: OUTPATIENT SERVICES | Facility: HOSPITAL | Age: 72
LOS: 1 days | Discharge: HOME | End: 2022-04-07

## 2022-04-07 DIAGNOSIS — Z90.13 ACQUIRED ABSENCE OF BILATERAL BREASTS AND NIPPLES: Chronic | ICD-10-CM

## 2022-04-07 DIAGNOSIS — Z90.49 ACQUIRED ABSENCE OF OTHER SPECIFIED PARTS OF DIGESTIVE TRACT: Chronic | ICD-10-CM

## 2022-04-07 DIAGNOSIS — C50.411 MALIGNANT NEOPLASM OF UPPER-OUTER QUADRANT OF RIGHT FEMALE BREAST: ICD-10-CM

## 2022-04-07 DIAGNOSIS — Z90.710 ACQUIRED ABSENCE OF BOTH CERVIX AND UTERUS: Chronic | ICD-10-CM

## 2022-04-07 DIAGNOSIS — Z51.11 ENCOUNTER FOR ANTINEOPLASTIC CHEMOTHERAPY: ICD-10-CM

## 2022-04-07 RX ORDER — PACLITAXEL 6 MG/ML
122 INJECTION, SOLUTION, CONCENTRATE INTRAVENOUS ONCE
Refills: 0 | Status: COMPLETED | OUTPATIENT
Start: 2022-04-07 | End: 2022-04-07

## 2022-04-07 RX ORDER — DIPHENHYDRAMINE HCL 50 MG
25 CAPSULE ORAL ONCE
Refills: 0 | Status: COMPLETED | OUTPATIENT
Start: 2022-04-07 | End: 2022-04-07

## 2022-04-07 RX ORDER — FAMOTIDINE 10 MG/ML
20 INJECTION INTRAVENOUS ONCE
Refills: 0 | Status: COMPLETED | OUTPATIENT
Start: 2022-04-07 | End: 2022-04-07

## 2022-04-07 RX ORDER — DEXAMETHASONE 0.5 MG/5ML
8 ELIXIR ORAL ONCE
Refills: 0 | Status: COMPLETED | OUTPATIENT
Start: 2022-04-07 | End: 2022-04-07

## 2022-04-07 RX ADMIN — FAMOTIDINE 104 MILLIGRAM(S): 10 INJECTION INTRAVENOUS at 10:52

## 2022-04-07 RX ADMIN — PACLITAXEL 270.33 MILLIGRAM(S): 6 INJECTION, SOLUTION, CONCENTRATE INTRAVENOUS at 11:11

## 2022-04-07 RX ADMIN — Medication 117.6 MILLIGRAM(S): at 10:52

## 2022-04-07 RX ADMIN — Medication 101 MILLIGRAM(S): at 10:52

## 2022-04-14 ENCOUNTER — APPOINTMENT (OUTPATIENT)
Dept: HEMATOLOGY ONCOLOGY | Facility: CLINIC | Age: 72
End: 2022-04-14
Payer: MEDICARE

## 2022-04-14 ENCOUNTER — LABORATORY RESULT (OUTPATIENT)
Age: 72
End: 2022-04-14

## 2022-04-14 ENCOUNTER — APPOINTMENT (OUTPATIENT)
Dept: INFUSION THERAPY | Facility: CLINIC | Age: 72
End: 2022-04-14
Payer: MEDICARE

## 2022-04-14 VITALS
RESPIRATION RATE: 18 BRPM | HEART RATE: 70 BPM | BODY MASS INDEX: 26.9 KG/M2 | WEIGHT: 137 LBS | TEMPERATURE: 97.2 F | DIASTOLIC BLOOD PRESSURE: 79 MMHG | SYSTOLIC BLOOD PRESSURE: 142 MMHG | HEIGHT: 60 IN

## 2022-04-14 DIAGNOSIS — Z51.11 ENCOUNTER FOR ANTINEOPLASTIC CHEMOTHERAPY: ICD-10-CM

## 2022-04-14 PROCEDURE — 99215 OFFICE O/P EST HI 40 MIN: CPT

## 2022-04-14 RX ORDER — FAMOTIDINE 10 MG/ML
20 INJECTION INTRAVENOUS ONCE
Refills: 0 | Status: COMPLETED | OUTPATIENT
Start: 2022-04-14 | End: 2022-04-14

## 2022-04-14 RX ORDER — PACLITAXEL 6 MG/ML
122 INJECTION, SOLUTION, CONCENTRATE INTRAVENOUS ONCE
Refills: 0 | Status: COMPLETED | OUTPATIENT
Start: 2022-04-14 | End: 2022-04-14

## 2022-04-14 RX ORDER — DIPHENHYDRAMINE HCL 50 MG
25 CAPSULE ORAL ONCE
Refills: 0 | Status: COMPLETED | OUTPATIENT
Start: 2022-04-14 | End: 2022-04-14

## 2022-04-14 RX ORDER — DEXAMETHASONE 0.5 MG/5ML
8 ELIXIR ORAL ONCE
Refills: 0 | Status: COMPLETED | OUTPATIENT
Start: 2022-04-14 | End: 2022-04-14

## 2022-04-14 RX ADMIN — FAMOTIDINE 104 MILLIGRAM(S): 10 INJECTION INTRAVENOUS at 10:53

## 2022-04-14 RX ADMIN — PACLITAXEL 270.33 MILLIGRAM(S): 6 INJECTION, SOLUTION, CONCENTRATE INTRAVENOUS at 11:44

## 2022-04-14 RX ADMIN — Medication 117.6 MILLIGRAM(S): at 10:54

## 2022-04-14 RX ADMIN — Medication 101 MILLIGRAM(S): at 10:53

## 2022-04-18 ENCOUNTER — APPOINTMENT (OUTPATIENT)
Dept: PLASTIC SURGERY | Facility: CLINIC | Age: 72
End: 2022-04-18
Payer: MEDICARE

## 2022-04-18 PROCEDURE — 99213 OFFICE O/P EST LOW 20 MIN: CPT

## 2022-04-18 NOTE — HISTORY OF PRESENT ILLNESS
[FreeTextEntry1] : Pt is a 70 y/o post menopausal F, , with PMH of scoliosis, HTN, Cervical CA s/p hysterectomy, and newly diagnosed RIGHT breast cancer who presents for breast reconstruction consultation. Pt states she self-palpated a lesion in February and underwent mammogram/US followed by biopsy confirming IDC and DCIS as well as right axillary +LN. Had not had a mammogram since age 32. Denies nipple bleeding, discharge, or inversion. She has decided to pursue bilateral mastectomy.   Dr. Ferguson recommend [facility].\par \par Per Dr. Tejada, will need either adjuvant or neoadjuvant chemotherapy. Pt has elected to do adjuvant chemotherapy.\par Pt states she would like to least amount of surgery possible for her reconstruction.\par \par H/o right lumpectomy x2 : benign nodules\par +family h/o breast cancer: paternal aunt, dx in her 40s\par \par Ht 5' Wt 128 lb  \par Current bra size: 34B, happy at this size\par Denies h/o VTE or MRSA infections\par Occ: Works at [employer]\par Here today with her daughter Sierra\par \par Interval hx (10/11/21): Pt presents today POD #12 s/p bilateral MRM and SLNB with immediate direct to implant reconstruction: left prepectoral, right subpectoral. Pt went to ED after discharge for increased left breast drain output and LLE swelling. Duplex negative. Now feeling better. Drain output: 1 (R IMF): /, 2 (R SL): , 3 (L SL): 10/7/7, 4 (L IMF): 10/5/5\par \par Interval hx (10/15/21):  Pt presents today POD #16 s/p bilateral MRM and SLNB with immediate direct to implant reconstruction: left prepectoral, right subpectoral. Taking PO abx as prescribed. C/o warmth to BL breasts but denies f/c or worsening pain or redness. Drain output: R- /5, L- 5//75\par \par Interval hx (10/25/21):  Here POD#26 s/p R MRM and SLNB with immediate direct to implant reconstruction; left pre-pectoral and right subpectoral.   left 15/10/8.  Denies fevers, chills, redness\par \par Interval hx (21):  Patient presents today POD#34 s/p B/L MRM and SLNB with immediate direct to implant reconstruction; left pre-pectoral and right subpectoral. Pt is doing better and reporting improvement in breast discomfort and swelling. Taking Gabapentin and Tylenol. Denies any f/c or drainage from incisions. \par \par Interval hx (11/15/21): 7 weeks s/p BL MRM and SLNB with immediate direct to implant reconstruction; left pre-pectoral and right subpectoral.  pt unhappy with asymmetry.  pt mostly unhappy 2/2 burning pain on left breast and stabbing pain at right IMF.   Pt to undergo chemotherapy and right breast XRT.\par \par Interval hx (3/14/22): 6 months s/p BL MRM and SLNB with immediate direct to implant reconstruction; left pre-pectoral and right subpectoral. Completed AC x4 and is now on weekly Taxol which she is tolerating much better. Would like to downsize implants to a "B" cup; also requesting left breast implant be placed retropectorally for symmetry. Concerned for left implant rupture due to change in appearance.\par \par Interval hx (22) 7 months s/p  MRM and SLNB with immediate direct to implant reconstruction; left pre-pectoral and right subpectoral.  Completed last cycle of chemotherapy last week.   She is start XRT after right breast re-excision with  Dr. Ferguson. She c/o persistent BL breast pain that she attributes from breast implant reconstruction.  Denies redness, swelling.  She is fearful of post-XRT related pain with the implants in place.   Pt has decided that she wishes to have BOTH her implants removed first, then undergo XRT.  She will consider her post-XRT breast reconstruction, if at all desires.

## 2022-04-18 NOTE — ASSESSMENT
[FreeTextEntry1] : 72 y/o F with newly diagnosed right breast IDC/DCIS and +axillary LN who has elected to undergo bilateral mastectomy\par \par 7 months s/p bilateral MRM and SLNB with immediate direct to implant reconstruction: left prepectoral, right subpectoral.  Excellent breast reconstruction appearance.  No evidence of capsular contracture.\par \par Pt requesting BL breast implant removal 2/2 to pain.\par \par -Reviewed patient's decision regarding potential outcomes of BL implant removal: BL breast mastectomy defect (Pt will be "flat chested", future delayed reconstruction may require more complex surgeries or several surgeries to reach an acceptable breast mount similar to her current reconstruction, and that her current pain may not be resolved with implant removal.\par -Benefits, risks and outcomes of implant removal reviewed in detail.\par -Alternative tx option: leave implants in place through XRT, with attendant implant removal in the future and delayed reconstruction with autologous tissue transfer.  This is mastectomy flap preserving.\par -Informed patient regarding option for non-surgical breast mound appearance: silicone bra prosthesis.\par -She understands that reconstructive appearance after delayed reconstruction following implant removal & XRT may result in asymmetrical scars (skin island differences)\par -After a thorough and detailed discussion, patient is still firm in her decision about proceeding with BL implant removal.  Her decision seems reasonable and she is fully aware of the possible outcomes follow implant removal.\par -All questions were answered to her satisfaction.  Informed consent was obtained.\par -Will coordinate with Dr. Ferguson - Breast surgeon\par \par Photos taken with patient's permission at last visit.\par \par Due to COVID-19, pre-visit patient instructions were explained to the patient and their symptoms were checked upon arrival. Masks were used by the healthcare provider and staff and the examination room was cleaned after the patient visit concluded\par

## 2022-04-18 NOTE — DATA REVIEWED
[FreeTextEntry1] : Pathology             Final\par \par No Documents Attached\par \par \par   [facility] Accession Number : [id number]\par Patient:   JOSE FRANCISCO THOMAS\par \par \par Accession:                             [id number]\par \par Collected Date/Time:                   9/29/2021 09:11 EDT\par Received Date/Time:                    9/29/2021 09:32 EDT\par \par Addendum Report - Auth (Verified)\par \par Addendum\par \par SURGICAL PATHOLOGY CANCER CASE SUMMARY\par \par INVASIVE CARCINOMA OF THE BREAST\par \par PROCEDURE: Total mastectomy\par SPECIMEN LATERALITY: Right\par TUMOR SITE: INVASIVE CARCINOMA: Upper outer quadrant\par TUMOR SIZE: 37 mm\par HISTOLOGIC TYPE: Invasive carcinoma, tubulo-lobular variant\par Invasive micropapillary carcinoma\par HISTOLOGIC GRADE (Richard Histologic Score)\par GLANDULAR (ACINAR)/TUBULAR DIFFERENTIATION: Score 2\par NUCLEAR PLEOMORPHISM: Score 3\par MITOTIC RATE: Score 3\par OVERALL GRADE: Grade 3\par TUMOR FOCALITY: Multiple foci of invasive carcinoma\par Number of foci: 2\par Sizes of individual foci: 37 mm, 8 mm\par DUCTAL CARCINOMA IN SITU (DCIS): Present, negative for EIC\par ARCHITECTURAL PATTERNS: Comedo, cribriform, micropapillary\par NUCLEAR GRADE: Grade II (intermediate) to Grade III (high)\par \par NECROSIS: Present\par LOBULAR CARCINOMA IN SITU (LCIS): No LCIS in specimen\par MARGINS\par INVASIVE CARCINOMA: Uninvolved by invasive carcinoma\par DCIS: Uninvolved by DCIS\par REGIONAL LYMPH NODES: Involved by tumor cells\par Number of lymph nodes with macrometastases (>2 mm): 8\par Number of lymph nodes with micrometastases (>0.2 mm to 2 mm  and/or\par >200 cells): 0\par Number of lymph nodes with isolated tumor ells (   <0.2 mm and  <200\par cells): 0\par Extranodal extension: Present\par Number of lymph nodes examined: 12\par Number of sentinel nodes examined: 0\par TREATMENT EFFECT: No known presurgical therapy\par LYMPHOVASCULAR INVASION: Present\par DERMAL LYMPHOVASCULAR INVASION: Present\par PATHOLOGIC STAGING\par TNM Descriptors: m (multiple foci of invasive carcinoma\par PRIMARY TUMOR: pT2: Tumor >20 mm but    <50 mm in greatest dimension\par REGIONAL LYMPH NODES: pN2a: Metastases in 4 to 9 axillary lymph\par nodes\par ADDITIONAL PATHOLOGIC FINDINGS: Fibroadenoma\par MICROCALCIFICATIONS: Present in DCIS\par CLINICAL HISTORY: Radiologic finding: mass or architectural distortion\par BREAST BIOMARKER REPORTING: Pending\par \par Verified by: Zan Baker M.D.\par (Electronic Signature)\par Reported on: 10/06/21 12:52 EDT, [address], [place] [address]\par Phone: [phone]   Fax: [phone]\par _________________________________________________________________\par Surgical Pathology Report - Auth (Verified)\par \par Specimen(s) Submitted\par 1  Left breast\par 2  Left breast sentinel lymph node #1\par 3  Left breast sentinel lymph node #2\par 4  Right breast\par 5  Right axillary contents\par \par Final Diagnosis\par 1. Breast, left, simple skin-sparing mastectomy:\par - Atrophic predominantly fatty breast tissue with focal atypical lobular\par hyperplasia (ALH), a small hyalinized fibroadenoma, and proliferative\par type fibrocystic changes associated with microcalcifications.\par - Unremarkable nipple, skin, and deep fascial margin.\par \par \par 2. Breast, left axillary sentinel lymph node #1, biopsy:\par -  One benign lymph node (0/1).  Negative for carcinoma with evaluation\par of multiple H T E levels and with negative    immunohistochemical  stains\par for  cytokeratins (CK AE1/AE3 and CK 8/18).\par \par 3. Breast, left axillary sentinel lymph node #2. biopsy:\par -  One benign lymph node (0/1).  Negative for carcinoma with evaluation\par of multiple H T E levels and with negative    immunohistochemical  stains\par for  cytokeratins (CK AE1/AE3 and CK 8/18).\par \par 4. Breast, right, modified radical skin-sparing mastectomy:\par - Upper outer quadrant healing prior biopsy site changes with multifocal\par invasive poorly differentiated micropapillary carcinoma, 3.7 cm\par (radiographic measurement), and invasive well differentiated tubulo-\par lobular variant of ductal carcinoma, 0.8 cm (microscopic measurement);\par both associated with focal necrosis.\par - Non-extensive ductal carcinoma in-situ (DCIS), cribriform and\par micropapillary types with comedo necrosis and microcalcifications,\par intermediate to focally high nuclear grade.\par - Numerous foci of lymphovascular invasion are present.\par - The invasive tumor extends to focally involve (touches both ink and\par cautery) the blue inked surface.\par - The invasive tumor also extends to infiltrate an adjacent hyalinized\par fibroadenoma.\par - Atrophic predominantly fatty breast tissue with focal atypical lobular\par hyperplasia (ALH) and proliferative type fibrocystic changes.\par - Unremarkable nipple, skin, and deep fascial margin. Negative for\par carcinoma.\par -  Pending special studies for ER/AL proteins, proliferation index\par (Ki-67), and HER2/FREEAMN   oncoprotein expression and/or gene amplification,\par with results to be reported as a separate addendum.\par - AJCC 8th Edition Pathologic Stage, m(2)pT2, pN2a, pMx.\par \par 5. Breast, right axillary contents, excision:\par - Metastatic carcinoma present in eight out of twelve axillary lymph\par nodes (8/12) with extensive extracapsular extension seen.\par - The largest node contains a metallic "ring-shaped" biopsy clip and\par shows focal prior biopsy site changes.\par - Adjacent fibroadipose connective tissue containing axillary ectopic\par atrophic benign fatty breast tissue.\par \par Verified by: Zan Baker M.D.\par (Electronic Signature)\par Reported on: 10/05/21 16:17 EDT, [address] [place] [id number]\par Phone: [phone]   Fax: [phone]\par _________________________________________________________________\par \par Comment\par Case reported to Tumor Registry.\par \par \par Intraoperative Consultation\par The specimen is submitted for INTRAOPERATIVE CONSULTATION and the FROZEN\par SECTION diagnosis is reported at [address] [place] [address], , [place] [address] as:\par \par 2 One benign sentinel lymph node (0/1)\par \par 3. One benign sentinel lymph node (0/1)\par \par Received at: 9:33 am\par Verbally reported at: 10:09 am by: Dr. DILLON Baker to: Jocy (Nurse)\par \par Clinical Information\par Bilateral skin sparing mastectomy, right axillary lymph node dissection,\par left sentinel node biopsy\par \par Perioperative Diagnosis\par Right breast cancer\par \par \par Gross Description\par 1.  The specimen is received fresh, labeled "left breast suture\par marks  axillary tail" and consists of a mastectomy specimen measuring\par from superior to inferior- 17.7 cm, anterior to posterior- 6 cm, medial\par to lateral- 11.5 cm and weighing 190 gm.  Attached is an ellipse of\par skin measuring 7.2 x 3.4 x    0.3 cm with a raised nipple measuring 0.8\par cm in length and an areolar measuring 3 x 3 cm.  The skin is tan white\par and wrinkled with no lesions or masses grossly observed.  The specimen\par is oriented as follows: stitch marks the axillary tail.  The specimen\par is inked as follows: green - upper inner quadrant. blue - upper outer\par quadrant, orange - lower inner quadrant, yellow - lower outer quadrant,\par posterior - black.  The specimen is serially sectioned from medial\par to lateral.  There are no masses grossly identified. Cross sectioning\par reveals yellow slightly pink fibroadipose tissue.    Representative sections\par are submitted.\par \par Summary of Sections:\par 1A-1B - Nipple -2\par 1C-1D - Representative the upper outer quadrant -2\par 1E-1F - Representative the upper inner quadrant -2\par 1G-1H - Lower inner quadrant -2\par 1I-1J - Lower outer quadrant -2\par 1K-1L - Representative of the deep margin -2\par \par Total: 12 blocks\par \par 2.  The specimen is received fresh, labeled "left sentinel node #1" and\par consists of a fragment of   tan red lobulated  fibroadipose tissue measuring\par 2 x 1.3 x 0.8 cm. There is one lymph node identified measuring 0.9 x 0.6\par x 0.5 cm. Lymph node is bisected and entirely submitted.    (2 blocks)\par \par 3.  The specimen is received fresh, labeled "left sentinel lymph node\par #2" and consists of a fragment of reddish yellow    fibroadipose  tissue\par measuring 2 x 1.3 x 1 cm. There is one lymph node identified measuring\par 1.5 x 1 x 0.9 cm. Entire lymph node is serially sectioned.    The specimen\par is submitted entirely. (3 blocks)\par \par 4.  The specimen is received fresh, labeled "right breast stitch\par marks  axillary tail" and consists of a mastectomy specimen measuring\par superior to inferior - 8.3 cm, anterior to posterior - 3.5 cm, medial\par to lateral - 12 cm and weighing 235 gm.  There is an ellipse of skin\par measuring 6.5 x 3 x 0.3 cm. It is tan white and wrinkled.  The     areolar\par complex measures 2.5 x 2.5 cm.  The nipple raised measures 0.6 cm\par in length.  The specimen is oriented by sutures as follows; stitch\par marks  axillary tail.  The specimen is inked as follows: upper outer\par quadrant - blue, upper inner quadrant - green, lower outer quadrant -\par yellow, lower inner quadrant - orange, posterior - black.     The  specimen\par is serially sectioned from medial to lateral.  There are three masses\par that were found on sectioning.  The first mass is found in the upper\par outer quadrant measuring 2.5 x 1.4 x 1.8 cm  it is oval white firm 0.3\par cm from the deep margin.  The second mass is located in the upper outer\par \par quadrant and is tan white firm with   microcalcifications  measuring 2.5 x\par 1.5 x 1.5 cm.  It is 0.5 cm from the deep margin.     There is a third mass\par found at the upper outer quadrant it is white round firm and calcified\par measuring 2.3 x 1.6 x 1.2 cm, 0.3 cm from the deep margin.  The remainder\par of the specimen is yellow adipose tissue.  Representative sections are\par submitted.\par \par Summary of Sections:\par 4A-4B - Nipple and the areola complex -2\par 4C-4J - Tissue around first mass -8\par 4K-4R - Representative of the first mass -8\par 4S-4X - Representative of second mass -6\par 4Y-4AB - Representative of the third mass -4\par 4AC-4AD - Representative sections of the right upper inner quadrant -2\par 4AE-4EF - Representative sections of the right lower outer quadrant -2\par 4AG-4AH - Representative sections of the right upper outer quadrant -2\par 4AI- Representative sections of the right deep margin -1\par 4AJ-4AK - Representative sections of the right  lower inner quadrant -2\par \par Total: 38 blocks\par \par 5.  The specimen is received fresh, labeled "right   axillary contents"\par and consists of multiple fragments of   lobulated  fibroadipose  tissue,\par measuring 6.5 x 6.0 x 3.0 cm in aggregate and weighs 23 gm. There are\par multiple possible lymph nodes identified.    The specimen is submitted\par entirely.\par \par Summary of Sections:\par 5A - Three possible lymph nodes -1\par 5B - One lymph node serially sectioned -1\par 5C - One lymph node serially sectioned -1\par 5D - One lymph node serially sectioned -1\par 5E-5F - One lymph node serially sectioned -2\par 5G-5M - One lymph node serially sectioned (5I ring shaped clip) -7\par 5N-5W - Remaining fatty tissue -10\par \par Total blocks -  23\par \par Specimen was received and underwent gross examination at [facility], [address] [place] [address].\par \par 09/29/2021 11:30:51 EDT gm/lm\par \par  \par \par  Ordered by: DOCUMENT, SCM       Collected/Examined: 29Sep2021 09:11AM       \par Verification Not Required       Stage: Final       \par  Performed at: [facility]       Resulted: [id number] 12:52PM       Last Updated: 06Oct2021 01:52PM       Accession: [id number]

## 2022-04-18 NOTE — PHYSICAL EXAM
[de-identified] : well-appearing, NAD [de-identified] : nonlabored breathing [de-identified] : ENEDELIAR [de-identified] : Bilateral breasts implants soft and mobile, left appears slightly larger than right due to implant position (left prepectoral, right subpectoral), incisions well-healed. excellent right pec muscle expansion

## 2022-04-24 PROBLEM — Z51.11 ENCOUNTER FOR ANTINEOPLASTIC CHEMOTHERAPY: Status: ACTIVE | Noted: 2021-12-11

## 2022-04-24 LAB
ALBUMIN SERPL ELPH-MCNC: 3 G/DL
ALBUMIN SERPL ELPH-MCNC: 4.3 G/DL
ALBUMIN SERPL ELPH-MCNC: 4.5 G/DL
ALBUMIN SERPL ELPH-MCNC: 4.6 G/DL
ALBUMIN SERPL ELPH-MCNC: 4.6 G/DL
ALBUMIN SERPL ELPH-MCNC: 4.7 G/DL
ALBUMIN SERPL ELPH-MCNC: 4.7 G/DL
ALP BLD-CCNC: 109 U/L
ALP BLD-CCNC: 114 U/L
ALP BLD-CCNC: 122 U/L
ALP BLD-CCNC: 38 U/L
ALP BLD-CCNC: 59 U/L
ALP BLD-CCNC: 62 U/L
ALP BLD-CCNC: 64 U/L
ALT SERPL-CCNC: 12 U/L
ALT SERPL-CCNC: 14 U/L
ALT SERPL-CCNC: 15 U/L
ALT SERPL-CCNC: 21 U/L
ALT SERPL-CCNC: 7 U/L
ALT SERPL-CCNC: 7 U/L
ALT SERPL-CCNC: 9 U/L
ANION GAP SERPL CALC-SCNC: 10 MMOL/L
ANION GAP SERPL CALC-SCNC: 12 MMOL/L
ANION GAP SERPL CALC-SCNC: 13 MMOL/L
ANION GAP SERPL CALC-SCNC: 13 MMOL/L
ANION GAP SERPL CALC-SCNC: 15 MMOL/L
ANION GAP SERPL CALC-SCNC: 16 MMOL/L
ANION GAP SERPL CALC-SCNC: 18 MMOL/L
AST SERPL-CCNC: 13 U/L
AST SERPL-CCNC: 14 U/L
AST SERPL-CCNC: 14 U/L
AST SERPL-CCNC: 15 U/L
AST SERPL-CCNC: 15 U/L
AST SERPL-CCNC: 16 U/L
AST SERPL-CCNC: 20 U/L
BACTERIA UR CULT: NORMAL
BILIRUB DIRECT SERPL-MCNC: <0.2 MG/DL
BILIRUB INDIRECT SERPL-MCNC: >0 MG/DL
BILIRUB INDIRECT SERPL-MCNC: >0 MG/DL
BILIRUB INDIRECT SERPL-MCNC: >0.1 MG/DL
BILIRUB INDIRECT SERPL-MCNC: >0.1 MG/DL
BILIRUB INDIRECT SERPL-MCNC: >0.2 MG/DL
BILIRUB INDIRECT SERPL-MCNC: NORMAL MG/DL
BILIRUB INDIRECT SERPL-MCNC: NORMAL MG/DL
BILIRUB SERPL-MCNC: 0.2 MG/DL
BILIRUB SERPL-MCNC: 0.2 MG/DL
BILIRUB SERPL-MCNC: 0.3 MG/DL
BILIRUB SERPL-MCNC: 0.4 MG/DL
BILIRUB SERPL-MCNC: <0.2 MG/DL
BUN SERPL-MCNC: 12 MG/DL
BUN SERPL-MCNC: 16 MG/DL
BUN SERPL-MCNC: 18 MG/DL
BUN SERPL-MCNC: 20 MG/DL
BUN SERPL-MCNC: 26 MG/DL
CALCIUM SERPL-MCNC: 6.2 MG/DL
CALCIUM SERPL-MCNC: 8.8 MG/DL
CALCIUM SERPL-MCNC: 9.3 MG/DL
CALCIUM SERPL-MCNC: 9.4 MG/DL
CALCIUM SERPL-MCNC: 9.6 MG/DL
CALCIUM SERPL-MCNC: 9.6 MG/DL
CALCIUM SERPL-MCNC: 9.8 MG/DL
CHLORIDE SERPL-SCNC: 101 MMOL/L
CHLORIDE SERPL-SCNC: 103 MMOL/L
CHLORIDE SERPL-SCNC: 103 MMOL/L
CHLORIDE SERPL-SCNC: 104 MMOL/L
CHLORIDE SERPL-SCNC: 105 MMOL/L
CHLORIDE SERPL-SCNC: 106 MMOL/L
CHLORIDE SERPL-SCNC: 116 MMOL/L
CO2 SERPL-SCNC: 17 MMOL/L
CO2 SERPL-SCNC: 19 MMOL/L
CO2 SERPL-SCNC: 21 MMOL/L
CO2 SERPL-SCNC: 21 MMOL/L
CO2 SERPL-SCNC: 23 MMOL/L
CO2 SERPL-SCNC: 23 MMOL/L
CO2 SERPL-SCNC: 24 MMOL/L
CREAT SERPL-MCNC: 0.6 MG/DL
CREAT SERPL-MCNC: 0.8 MG/DL
CREAT SERPL-MCNC: <0.5 MG/DL
EGFR: 115 ML/MIN/1.73M2
EGFR: 97 ML/MIN/1.73M2
EGFR: 97 ML/MIN/1.73M2
GLUCOSE SERPL-MCNC: 103 MG/DL
GLUCOSE SERPL-MCNC: 62 MG/DL
GLUCOSE SERPL-MCNC: 72 MG/DL
GLUCOSE SERPL-MCNC: 86 MG/DL
GLUCOSE SERPL-MCNC: 89 MG/DL
GLUCOSE SERPL-MCNC: 93 MG/DL
GLUCOSE SERPL-MCNC: 95 MG/DL
HCT VFR BLD CALC: 29.4 %
HCT VFR BLD CALC: 31.4 %
HCT VFR BLD CALC: 33 %
HCT VFR BLD CALC: 33.3 %
HCT VFR BLD CALC: 33.6 %
HCT VFR BLD CALC: 33.6 %
HCT VFR BLD CALC: 34.7 %
HCT VFR BLD CALC: 35.2 %
HCT VFR BLD CALC: 35.6 %
HCT VFR BLD CALC: 37.6 %
HCT VFR BLD CALC: 38.7 %
HCT VFR BLD CALC: 39.3 %
HGB BLD-MCNC: 10.5 G/DL
HGB BLD-MCNC: 11 G/DL
HGB BLD-MCNC: 11.1 G/DL
HGB BLD-MCNC: 11.2 G/DL
HGB BLD-MCNC: 11.4 G/DL
HGB BLD-MCNC: 11.7 G/DL
HGB BLD-MCNC: 12 G/DL
HGB BLD-MCNC: 12.2 G/DL
HGB BLD-MCNC: 13 G/DL
HGB BLD-MCNC: 13.3 G/DL
HGB BLD-MCNC: 13.4 G/DL
HGB BLD-MCNC: 9.9 G/DL
MCHC RBC-ENTMCNC: 29.9 PG
MCHC RBC-ENTMCNC: 30 PG
MCHC RBC-ENTMCNC: 30.7 PG
MCHC RBC-ENTMCNC: 30.8 PG
MCHC RBC-ENTMCNC: 30.9 PG
MCHC RBC-ENTMCNC: 31.1 PG
MCHC RBC-ENTMCNC: 31.2 PG
MCHC RBC-ENTMCNC: 31.3 PG
MCHC RBC-ENTMCNC: 31.3 PG
MCHC RBC-ENTMCNC: 31.4 PG
MCHC RBC-ENTMCNC: 31.5 PG
MCHC RBC-ENTMCNC: 31.7 PG
MCHC RBC-ENTMCNC: 32.7 G/DL
MCHC RBC-ENTMCNC: 33 G/DL
MCHC RBC-ENTMCNC: 33.4 G/DL
MCHC RBC-ENTMCNC: 33.7 G/DL
MCHC RBC-ENTMCNC: 33.7 G/DL
MCHC RBC-ENTMCNC: 33.9 G/DL
MCHC RBC-ENTMCNC: 34.1 G/DL
MCHC RBC-ENTMCNC: 34.1 G/DL
MCHC RBC-ENTMCNC: 34.2 G/DL
MCHC RBC-ENTMCNC: 34.3 G/DL
MCHC RBC-ENTMCNC: 34.4 G/DL
MCHC RBC-ENTMCNC: 34.6 G/DL
MCV RBC AUTO: 88.8 FL
MCV RBC AUTO: 90.1 FL
MCV RBC AUTO: 91 FL
MCV RBC AUTO: 91.1 FL
MCV RBC AUTO: 91.6 FL
MCV RBC AUTO: 91.7 FL
MCV RBC AUTO: 91.8 FL
MCV RBC AUTO: 91.9 FL
MCV RBC AUTO: 92.4 FL
MCV RBC AUTO: 92.5 FL
MCV RBC AUTO: 92.5 FL
MCV RBC AUTO: 93.3 FL
PLATELET # BLD AUTO: 165 K/UL
PLATELET # BLD AUTO: 188 K/UL
PLATELET # BLD AUTO: 191 K/UL
PLATELET # BLD AUTO: 192 K/UL
PLATELET # BLD AUTO: 221 K/UL
PLATELET # BLD AUTO: 221 K/UL
PLATELET # BLD AUTO: 233 K/UL
PLATELET # BLD AUTO: 238 K/UL
PLATELET # BLD AUTO: 239 K/UL
PLATELET # BLD AUTO: 245 K/UL
PLATELET # BLD AUTO: 261 K/UL
PLATELET # BLD AUTO: 274 K/UL
PMV BLD: 10 FL
PMV BLD: 10.3 FL
PMV BLD: 10.4 FL
PMV BLD: 10.5 FL
PMV BLD: 10.8 FL
PMV BLD: 10.9 FL
PMV BLD: 10.9 FL
PMV BLD: 11 FL
POTASSIUM SERPL-SCNC: 2.8 MMOL/L
POTASSIUM SERPL-SCNC: 4.1 MMOL/L
POTASSIUM SERPL-SCNC: 4.1 MMOL/L
POTASSIUM SERPL-SCNC: 4.2 MMOL/L
POTASSIUM SERPL-SCNC: 4.2 MMOL/L
POTASSIUM SERPL-SCNC: 4.4 MMOL/L
POTASSIUM SERPL-SCNC: 4.5 MMOL/L
PROT SERPL-MCNC: 4.2 G/DL
PROT SERPL-MCNC: 6.4 G/DL
PROT SERPL-MCNC: 6.4 G/DL
PROT SERPL-MCNC: 6.6 G/DL
PROT SERPL-MCNC: 6.6 G/DL
PROT SERPL-MCNC: 6.8 G/DL
PROT SERPL-MCNC: 6.9 G/DL
RBC # BLD: 3.31 M/UL
RBC # BLD: 3.4 M/UL
RBC # BLD: 3.6 M/UL
RBC # BLD: 3.67 M/UL
RBC # BLD: 3.75 M/UL
RBC # BLD: 3.83 M/UL
RBC # BLD: 3.88 M/UL
RBC # BLD: 4.13 M/UL
RBC # BLD: 4.25 M/UL
RBC # BLD: 4.36 M/UL
RBC # FLD: 12.3 %
RBC # FLD: 12.3 %
RBC # FLD: 12.5 %
RBC # FLD: 12.8 %
RBC # FLD: 12.8 %
RBC # FLD: 13.2 %
RBC # FLD: 14 %
RBC # FLD: 15 %
RBC # FLD: 16.8 %
RBC # FLD: 17 %
RBC # FLD: 17.3 %
RBC # FLD: 17.6 %
SODIUM SERPL-SCNC: 138 MMOL/L
SODIUM SERPL-SCNC: 139 MMOL/L
SODIUM SERPL-SCNC: 139 MMOL/L
SODIUM SERPL-SCNC: 140 MMOL/L
SODIUM SERPL-SCNC: 141 MMOL/L
SODIUM SERPL-SCNC: 143 MMOL/L
SODIUM SERPL-SCNC: 143 MMOL/L
WBC # FLD AUTO: 25.88 K/UL
WBC # FLD AUTO: 4.46 K/UL
WBC # FLD AUTO: 4.54 K/UL
WBC # FLD AUTO: 4.55 K/UL
WBC # FLD AUTO: 4.65 K/UL
WBC # FLD AUTO: 4.68 K/UL
WBC # FLD AUTO: 4.76 K/UL
WBC # FLD AUTO: 4.82 K/UL
WBC # FLD AUTO: 4.84 K/UL
WBC # FLD AUTO: 5.46 K/UL
WBC # FLD AUTO: 5.74 K/UL
WBC # FLD AUTO: 9.8 K/UL

## 2022-04-24 NOTE — HISTORY OF PRESENT ILLNESS
[de-identified] : JOSE FRANCISCO THOMAS is a 71 year old female PMHx of cervical cancer s/p hysterectomy and unilateral salpingo-oophorectomy,  HTN presents for newly diagnosed invasive breast cancer. \par \par Patient first palpated a mass in the RUOQ herself within the past year. She had pain at the site and feels that it is growing. She went for b/l diagnostic mammo and sonogram on 6/17/2021. Her left breast had no suspicious findings however on her right breast a spiculated mass in the RUOQ as well as a second mass superior to with a thickened axillary 2.2 cm axillary node was seen. \par -breasts are heterogeneously dense\par -R: UOQ, spiculated mass, with calcifications, c/w US finding below \par -R: Medial to this mass in the posterior depth, an area of architectural distortion. \par -R: Slightly superior to this mass, there is a an additional circumscribed mass also seen on concurrent ultrasound.\par Right breast:\par - In the axilla, at the 10 to 11:00 position 12 cm from the nipple, there is a cortically thickened axillary lymph node measuring 2.2 cm --> BIOPSY \par -Corresponding to the area palpable concern at the 10 to 11:00 position 5 to 8 cm from the nipple, there is a spiculated mass with associated coarse calcifications measuring 2.7 x 1.2 x 1.8 cm --> BIOPSY \par -At the 11:00 position 10 cm from the nipple, there is a circumscribed hypoechoic mass measuring 1.3 x 1.2 x 0.4 cm, corresponding to mammographic findings --> BIOPSY \par -At the 6:00 position 5 cm from the nipple, there is a circumscribed hypoechoic mass measuring 0.3 x 0.3 x 0.2 cm.\par -At the 6:00 position 4 cm from the nipple, there is a circumscribed hypoechoic mass measuring 0.6 x 0.6 x 0.2 cm --> BIOPSY \par BI-RADS Category 5: Highly Suggestive of Malignancy\par \par On 7/21/2021, she went for US guided biopsy of  the 2cm mass which showed invasive moderately differential ductal carcinoma, with dominant micrpapillary features\par 1. Breast, right 10-11 N5-8 mass, ultrasound guided needle core\par biopsies:\par - Invasive moderately differentiated ductal carcinoma with dominant micropapillary features, ER 99%, TX 15%. Ki67 20%, Her 2 negative (IHC 1+, Her2/CEP17 1.9)\par - Ductal carcinoma in-situ (DCIS), cribriform type with comedo\par necrosis and microcalcifications, intermediate nuclear grade.\par - Foci of lymphovascular invasion are present.\par \par 2. Breast, right 11 N10 mass, ultrasound guided needle core biopsies:\par - Benign atrophic fatty breast tissue with a dominant macrocyst wall fragment demonstrating an attenuated epithelial lining; consistent with a dilated duct.\par \par 3. Breast, right 6 N4 mass, ultrasound guided needle core biopsies:\par - Hyalinized fibroadenoma.\par \par 4. Breast, right axillary mass, ultrasound guided needle core biopsies:\par - Lymph node fragments containing metastatic carcinoma (1/1), the largest contiguous focus of which measures 6.0 mm (microscopic measurement).\par - No extracapsular extension is identified in this biopsy material.\par \par On 8/16/21, she had a PET/CT which showed pathologic FDG uptake (SUV 7) coregistering with 3.6 x 2 cm right breast mass and pathologic FDG uptake in 2 cm right axillary nodule (SUV 6.1) consistent with documented biologic tumor activity. No other sites of abnormal FDG uptake\par \par On 8/18/21, she had b/l breast MRI MRI which showed the biopsy proven right breast carcinoma 1.9 x 3.7 x 3.5 cm and 2 cm right axillary adenopathy. No additional suspicious enhancement in either breast. \par \par She saw Dr. Ferguson for surgical consultation. She was given options to have neoadjuvant chemo followed by surgery or immediate surgery. \par \par Her family history is significant for breast cancer in her paternal aunt in her 40s,  2 uncles who had "brain cancer"; and her mom who had cervical cancer. \par  [de-identified] : 10/27/21:\dharmesh Galicia is here today for followup visit. She was previously seen on 8/20/21. She was diagnosed with  IDC of the right breast with 3 cm mass and positive right axillary lymph node, ER/AZ positive, Her-2 negative. Clinical stage is T3N1 with Oncotype RS 27. She was given an option to have neoadjuvant chemotherapy prior to surgery but she opted to have surgery first. On 9/29/21, she underwent right breast modified radical skin-sparing mastectomy, right ALND, left breast simple skin-sparing mastectomy and left axillary SLNB with immediate direct to implant reconstruction. \par - The right mastectomy revealed 3.7 cm invasive poorly differentiated micropapillary carcinoma, ER 85%, AZ 15%, Her-2 negative (FISH ration 1.8), Ki 67 20-25% and 0.8 cm invasive well differentiated tubulo-lobular variant of ductal carcinoma, %, AZ 90%, Her-2 negative and Ki 67 3-5%,  Non-extensive ductal carcinoma in-situ (DCIS), cribriform and micropapillary types with comedo necrosis and microcalcifications, intermediate to focally high nuclear grade. Numerous foci of lymphovascular invasion are present. The invasive tumor extends to focally involve (touches both ink and cautery) the blue inked surface. The nipple, skin, and deep fascial margin are negative for carcinoma. Right ALND reveal metastatic carcinoma present in eight out of twelve axillary lymph nodes (8/12) with extensive extracapsular extension seen. The largest node contains a metallic "ring-shaped" biopsy clip and shows focal prior biopsy site changes.\par AJCC 8th Edition Pathologic Stage, m(2)pT2, pN2a, pMx.\par - The left breast simple skin-sparing mastectomy show atrophic predominantly fatty breast tissue with focal atypical lobular hyperplasia (ALH). 2 sentinel lymph noses are negative for malignancy.  \par She recovered well from surgery and is here today to discuss adjuvant systemic therapy. \par \par 12/2/21\dharmesh Galicia is here today for followup visit and chemotherapy. She has stage IIIA ([id number]) right breast multifocal invasive poorly differentiated micropapillary carcinoma, 3.7 cm and invasive well differentiated tubulo-lobular variant of ductal carcinoma, 0.8 cm, ER/AZ positive, Her-2 negative, s/p right breast modified radical skin-sparing mastectomy, right ALND, left breast simple skin-sparing mastectomy and left axillary SLNB with immediate implant reconstruction. 8/12 right axillary lymph nodes are positive for metastatic carcinoma with extensive extracapsular extension. Oncotype RS is 27. She is indicated for adjuvant chemotherapy.  She had port a cath placed on 11/18 to left chest wall and felt pain at port site with movement of left upper extremity. She followed up with IR regarding concerns, no evidence of port infection, or blockage/clot within port catheter. She also had HOMA done on 9/30 LV EF normal. \par \par 12/16/21\dharmesh Galicia is here today for followup visit and chemotherapy. She has stage IIIA ([id number]) right breast multifocal invasive poorly differentiated micropapillary carcinoma, 3.7 cm and invasive well differentiated tubulo-lobular variant of ductal carcinoma, 0.8 cm, ER/AZ positive, Her-2 negative, s/p right breast modified radical skin-sparing mastectomy, right ALND, left breast simple skin-sparing mastectomy and left axillary SLNB with immediate implant reconstruction. 8/12 right axillary lymph nodes are positive for metastatic carcinoma with extensive extracapsular extension. Oncotype RS is 27. She started adjuvant chemotherapy with dose-dense AC. To date she received 1 cycle of AC, tolerating well. She denies nausea, vomiting, diarrhea or breast pain. She c/o that her constipation is worse from her baseline, taking Senna twice daily.\par She also had HOMA done on 9/30/21 LV EF normal. \par \par 12/30/21\dharmesh Galicia is here today for followup visit and chemotherapy. She has stage IIIA ([id number]) right breast multifocal invasive poorly differentiated micropapillary carcinoma, 3.7 cm and invasive well differentiated tubulo-lobular variant of ductal carcinoma, 0.8 cm, ER/AZ positive, Her-2 negative, s/p right breast modified radical skin-sparing mastectomy, right ALND, left breast simple skin-sparing mastectomy and left axillary SLNB with immediate implant reconstruction. 8/12 right axillary lymph nodes are positive for metastatic carcinoma with extensive extracapsular extension. She started adjuvant chemotherapy with dose-dense AC. To date she received 2 cycles of AC, tolerating well. She denies nausea, vomiting, diarrhea or breast pain. Her constipation has improved with senna 3 tablets and colace. She also had HOMA done on 9/30/21 LV EF normal. \par \par 1/13/22:jossie Galicia is here today for followup visit and chemotherapy. She has stage IIIA ([id number]) right breast multifocal invasive poorly differentiated micropapillary carcinoma, 3.7 cm and invasive well differentiated tubulo-lobular variant of ductal carcinoma, 0.8 cm, ER/AZ positive, Her-2 negative, s/p right breast modified radical skin-sparing mastectomy, right ALND, left breast simple skin-sparing mastectomy and left axillary SLNB with immediate implant reconstruction. 8/12 right axillary lymph nodes are positive for metastatic carcinoma with extensive extracapsular extension. She has been on adjuvant chemotherapy with dose-dense AC. To date she received 3 cycles of AC, tolerating well. She saw Dr. Robert and had repeat 2D Echo. \par \par 1/27/22:jossie Galicia is here today for followup visit and chemotherapy. She has stage IIIA ([id number]) right breast multifocal invasive poorly differentiated micropapillary carcinoma, 3.7 cm and invasive well differentiated tubulo-lobular variant of ductal carcinoma, 0.8 cm, ER/AZ positive, Her-2 negative, s/p right breast modified radical skin-sparing mastectomy, right ALND, left breast simple skin-sparing mastectomy and left axillary SLNB with immediate implant reconstruction. 8/12 right axillary lymph nodes are positive for metastatic carcinoma with extensive extracapsular extension. She has been on adjuvant chemotherapy. She finished dose-dense AC x 4. She reported white coating on oral mucosa. She was given Nystatin swishing and spiting. She is feeling better now. She saw Dr. Robert and had repeat 2D Echo. \par \par 2/24/22jossie Galicia is here today for followup visit and chemotherapy. She has stage IIIA ([id number]) right breast multifocal invasive poorly differentiated micropapillary carcinoma, 3.7 cm and invasive well differentiated tubulo-lobular variant of ductal carcinoma, 0.8 cm, ER/AZ positive, Her-2 negative, s/p right breast modified radical skin-sparing mastectomy, right ALND, left breast simple skin-sparing mastectomy and left axillary SLNB with immediate implant reconstruction. 8/12 right axillary lymph nodes are positive for metastatic carcinoma with extensive extracapsular extension. She has been on adjuvant chemotherapy. She finished dose-dense AC x 4. She is currently on weekly Taxol. Today, she is scheduled for 5/12 Taxol.  She c/o bloatedness, hearing loss, anxiety, nervousness, feeling agitated.  She is not happy about gaining 5 lbs in 1 month.  Denies fever, N/V/C/D, paresthesias.\par \par 3/24/22:\dharmesh Galicia is here today for followup visit and chemotherapy. She has stage IIIA ([id number]) right breast multifocal invasive poorly differentiated micropapillary carcinoma, 3.7 cm and invasive well differentiated tubulo-lobular variant of ductal carcinoma, 0.8 cm, ER/AZ positive, Her-2 negative, s/p right breast modified radical skin-sparing mastectomy, right ALND, left breast simple skin-sparing mastectomy and left axillary SLNB with immediate implant reconstruction. 8/12 right axillary lymph nodes are positive for metastatic carcinoma with extensive extracapsular extension. She has been on adjuvant chemotherapy. She finished dose-dense AC x 4. She is currently on weekly Taxol. Today, she is scheduled for 9/12 Taxol.  She reports chemotherapy-related nail changes. \par \par 4/14/22:\dharmesh Galicia is here today for followup visit and chemotherapy. She has stage IIIA ([id number]) right breast multifocal invasive poorly differentiated micropapillary carcinoma, 3.7 cm and invasive well differentiated tubulo-lobular variant of ductal carcinoma, 0.8 cm, ER/AZ positive, Her-2 negative, s/p right breast modified radical skin-sparing mastectomy, right ALND, left breast simple skin-sparing mastectomy and left axillary SLNB with immediate implant reconstruction. 8/12 right axillary lymph nodes are positive for metastatic carcinoma with extensive extracapsular extension. She has been on adjuvant chemotherapy. She finished dose-dense AC x 4. She is currently on weekly Taxol. Today, she is scheduled for last treatment (week 12 Taxol).

## 2022-04-24 NOTE — PHYSICAL EXAM
[Restricted in physically strenuous activity but ambulatory and able to carry out work of a light or sedentary nature] : Status 1- Restricted in physically strenuous activity but ambulatory and able to carry out work of a light or sedentary nature, e.g., light house work, office work [Normal] : affect appropriate [de-identified] : S/P b/l mastectomy and implant reconstruction. Surgical incisions are healing well. There is no palpable abnormality.  [de-identified] : port-a-cath placed to left chest wall, healing well, Steri-Strips in place.

## 2022-04-24 NOTE — ASSESSMENT
[FreeTextEntry1] : 70 yo female has stage IIIA ([id number]) right breast multifocal invasive poorly differentiated micropapillary carcinoma, 3.7 cm and invasive well differentiated tubulo-lobular variant of ductal carcinoma, 0.8 cm, ER/VT positive, Her-2 negative, s/p right breast modified radical skin-sparing mastectomy, right ALND, left breast simple skin-sparing mastectomy and left axillary SLNB with immediate direct to implant reconstruction. 8/12 right axillary lymph nodes are positive for metastatic carcinoma with extensive extracapsular extension.\par Oncotype RS is 27. \par Staging PET/CT on 8/16/21 was negative for distant mets. \par \par The left breast has atypical lobular hyperplasia. \par \par Assessment and Plan:\par -- Proceed to last week Taxol today. \par --The side effects of chemo were checked and reviewed again. \par -- CBC reviewed. Blood counts are adequate for chemo. \par -- Zofran and Compazine as needed for n/v.\par -- Order lab work prior to chemo:  BMP, LFT.\par -- Grade 1 nail toxicity from chemo. Will monitor for now.\par -- TTE on 9/30/21 showed normal LV Ejection Fraction of 66 %. Echo also reported moderately enlarged right ventricle, mild-moderate tricuspid regurgitation, mild to moderate pulmonic valve regurgitation. Discussed with Dr. Robert, cardiology. He compared patient's current and previous Echo. There is only mild enlarged right ventricle. She saw Dr. Robert and had repeat 2D Echo. \par -- Regarding to positive margin, She will see Dr. Ferguson to discuss reexcision after completion of adjuvant chemotherapy. \par -- Discussed to start adjuvant endocrine therapy. Letrozole was recommended. We discussed the side effects of Letrozole which may include but not limit to muscular and skeletal aching, arthralgia, loss of bone density, osteopenia and osteoporosis, a small increase risk of cardiovascular events and slightly increase of hyperlipidemia. A script was sent to her pharmacy. \par -- RTO for f/u in 4 weeks. \par \par \par \par

## 2022-05-03 ENCOUNTER — APPOINTMENT (OUTPATIENT)
Dept: BREAST CENTER | Facility: CLINIC | Age: 72
End: 2022-05-03
Payer: MEDICARE

## 2022-05-03 VITALS
HEIGHT: 60 IN | SYSTOLIC BLOOD PRESSURE: 143 MMHG | BODY MASS INDEX: 26.9 KG/M2 | TEMPERATURE: 97.5 F | DIASTOLIC BLOOD PRESSURE: 81 MMHG | WEIGHT: 137 LBS

## 2022-05-03 PROCEDURE — 99212 OFFICE O/P EST SF 10 MIN: CPT

## 2022-05-03 NOTE — ASSESSMENT
[FreeTextEntry1] : JOSE FRANCISCO THOMAS is a 72 y/o post menopausal F who presents with a self palpated RIGHT breast cancer, [id number], ER/RI pos, Her 2 neg, stage IIB AJCC 8th edition. She is s/p  LEFT SSM with sentinel LN bx and RIGHT SSM with axillary dissection on 09/29/21 \par \par On physical exam: Bilateral breasts implants soft and mobile, left appears slightly larger than right due to implant position (left prepectoral, right subpectoral), incisions healed well \par \par \par AS REVIEW\par Her most recent imaging was a b/l dx mammogram and US on 6/17/2021 which revealed the following RIGHT breast findings: \par - In the axilla, @10-11N12, cortically thickened axillary lymph node measuring 2.2 cm --> METASTATIC CARCINOMA\par -C/W palpable concern @10-11N5-8, spiculated mass with associated coarse calcifications, measures 2.7 x 1.2 x 1.8 cm --> IDC\par -@11N10, circumscribed hypoechoic mass, measures 1.3 x 1.2 x 0.4 cm, --> DILATED DUCT\par -@6N5, circumscribed hypoechoic mass measuring 0.3 x 0.3 x 0.2 cm\par -@6N4, circumscribed hypoechoic mass measuring 0.6 x 0.6 x 0.2 cm --> hyalinized fibroadenoma\par \par We discussed fibroadenomas.  These are benign lesions without any malignant potential.  They are hormonally influenced and can increase or decrease in size and can also regress spontaneously.  They are considered proliferative lesions without atypia.  Patients with these lesions have been found to have a slightly increased relative risk of breast cancer compared to the reference population.  Surgical excision is recommended when the diagnosis in unclear, the lesion is causing pain or breast deformity, or if it is rapidly enlarging. \par \par She is currently asymptomatic from her right breast fibroadenoma, so no intervention is indicated for this. \par \par We discussed her new diagnosis of stage IIB RIGHT breast invasive ductal carcinoma (IDC). \par \par She may need systemic chemotherapy given the size of the tumor and positive lymph node.  After speaking with the medical oncologist, we will have her biopsy specimen sent for an ONCOTYPE to determine if she would benefit from chemotherapy. Her ONCOTYPE score was 27, and was recommended for chemotherapy with AC + T, to be given before or after surgery.  After discussion, she has decided to proceed with surgery first.  \par \par OR: RIGHT MODIFIED RADICAL MASTECTOMY, EXCISION OF RIGHT AXILLARY LYMPH NODE WITH RF LOCALIZATION; LEFT MASTECTOMY, SENTINEL LYMPH NODE MAPPING AND BIOPSY, BILATERAL PECTORAL BLOCKS, IMMEDIATE RECONSTRUCTION \par \par We discussed the option of giving chemotherapy before or after surgery.  If given before surgery, this is considered neoadjuvant chemotherapy.  The benefits of undergoing neoadjuvant chemotherapy is that it will provide us with better local control of her breast cancer especially if she has a good pathologic response.  If she has a complete clinical response in her breast and lymph nodes, we may be able to avoid an axillary lymph node dissection which would reduce the morbidity of her surgery.  In addition, it gives us prognostic information regarding her tumor response to chemotherapy.  Patients who have a complete pathologic response (pCR) were found to have an improved prognosis compared to women who do not have a pCR.  \par \par However, prior to starting chemotherapy, and given the locally advanced nature of her disease, I would also like to obtain staging scans with a PET/CT. This revealed an FDG avid R breast mass measures 3.6 x 2 cm and an FDG avid R axillary nodule, measuring 2 cm.  She did not have any other sites of abnormal FDG uptake. \par \par In regards to radiation therapy, depending on her margins, involvement of her pectoralis muscle, and number of lymph nodes positive for disease, she will need post mastectomy radiation.\par \par At the completion of all these therapy, she will need endocrine therapy, likely with an AI as she is post menopausal, which will reduce her rate of recurrence by about 50% to 2/3rds.\par \par Per [facility] consensus guidelines, any patient with a diagnosis of breast cancer should be offered panel genetic testing as 10% of these patients were found to have a mutation.  THis was offered to her and she would like to proceed with panel genetic testing.  Her blood was drawn today. 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  This is not a pathogenic mutation, however, she should continue to follow up these results, as there is a possibility that this could be re-classified in the future.  She will be referred to Kaye, genetic counselor, following her surgery. \par \par All of her questions were answered.  She knows to call with any further questions or concerns. \par \par PLAN: \par -OR:REMOVAL OF MEDIPORT\par -MRI now (will call her with results)\par -INVITAE Panel genetic testing -- VUS in DICER1, referral for genetic counselor \par -ONCOTYPE on biopsy specimen -- 27 \par -follow up 6 months for CBE \par -follow up med/onc\par -follow up rad/onc\par \par

## 2022-05-03 NOTE — PHYSICAL EXAM
[Normocephalic] : normocephalic [Atraumatic] : atraumatic [EOMI] : extra ocular movement intact [Examined in the supine and seated position] : examined in the supine and seated position [No dominant masses] : no dominant masses in right breast  [No dominant masses] : no dominant masses left breast [No Axillary Lymphadenopathy] : no left axillary lymphadenopathy [No Edema] : no edema [No Rashes] : no rashes [No Ulceration] : no ulceration [de-identified] : Bilateral breasts implants soft and mobile, left appears slightly larger than right due to implant position (left prepectoral, right subpectoral), incisions healed well

## 2022-05-03 NOTE — HISTORY OF PRESENT ILLNESS
[FreeTextEntry1] : JOSE FRANCISCO THOMAS is a 70 y/o post menopausal F who presents with a self palpated RIGHT breast cancer, [id number], ER/HI pos, Her 2 neg, stage IIIB AJCC 8th edition. \par \par 2021 -- b/l SSM, R ALND, L SLN Bx with immediate reconstruction \par 1. 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 LEFT breast \par -atypical lobular hyperplasia \par \par Of note, her daughter, Sierra was present for the entirety of this consultation. \par \par She first palpated a mass in the R UOQ, for the past year.  She has noticed worsening pain in that area and does think that it has increased in size since she first felt it.  She denies any left sided breast complaints and denies any nipple discharge or retraction. \par \par Her work up was as follows:\par 2021 - B/L Dx Mammo & Sono:\par -breasts are heterogeneously dense\par -R: UOQ, spiculated mass, with calcifications, c/w US finding below \par -R: Medial to this mass in the posterior depth, an area of architectural distortion. \par -R: Slightly superior to this mass, there is a an additional circumscribed mass also seen on concurrent ultrasound.\par Right breast:\par - In the axilla, at the 10 to 11:00 position 12 cm from the nipple, there is a cortically thickened axillary lymph node measuring 2.2 cm --> BIOPSY \par -Corresponding to the area palpable concern at the 10 to 11:00 position 5 to 8 cm from the nipple, there is a spiculated mass with associated coarse calcifications measuring 2.7 x 1.2 x 1.8 cm --> BIOPSY \par -At the 11:00 position 10 cm from the nipple, there is a circumscribed hypoechoic mass measuring 1.3 x 1.2 x 0.4 cm, corresponding to mammographic findings --> BIOPSY \par -At the 6:00 position 5 cm from the nipple, there is a circumscribed hypoechoic mass measuring 0.3 x 0.3 x 0.2 cm.\par -At the 6:00 position 4 cm from the nipple, there is a circumscribed hypoechoic mass measuring 0.6 x 0.6 x 0.2 cm --> BIOPSY \par Left breast:\par No suspicious solid or cystic masses. No axillary adenopathy.\par BI-RADS Category 5: Highly Suggestive of Malignancy\par \par \par 2021 - US Guided Core Bx:\par 1. Right, 10-11:00 N5-8, 2.7cm: (top-hat)\par - Invasive moderately differentiated ductal carcinoma with dominant micropapillary features.\par - Ductal carcinoma in-situ (DCIS), cribriform type with comedo necrosis and microcalcifications, intermediate nuclear grade.\par - Foci of lymphovascular invasion are present.\par -ER  (+)  99%\par -HI  (+)  15%\par -HER2  (-) on IHC\par -Ki-67  20%\par \par 2. Right, 11:00 N10, 1.3cm: (mini-cork)\par - Benign atrophic fatty breast tissue with a dominant macrocyst\par wall fragment demonstrating an attenuated epithelial lining;\par consistent with a dilated duct.\par Findings are benign concordant.\par \par 3. Right, 6:00 N4, 0.6cm: (stoplight clip) \par - Hyalinized fibroadenoma.\par Findings are benign concordant.\par \par 4. Right, axillary mass 10-12:00 N12, 2.2cm: (twirl)\par - Lymph node fragments containing metastatic carcinoma (),\par the largest contiguous focus of which measures 6.0 mm\par (microscopic measurement).\par - No extracapsular extension is identified in this biopsy material.\par Findings are malignant concordant.\par \par HISTORICAL RISK FACTORS: \par -no prior breast biopsies, hx of lumpectomy for reportedly benign disease  \par -family history of breast cancer in a paternal aunt at age 45 \par -, age at first live birth was 24 \par -no prior OCP use \par -s/p DAVIE, unilateral oophorectomy in  \par \par Bx specimen sent for Oncotype Dx = 27.\par \par INTERVAL HISTORY:\par 2021 --\par Jose Francisco returns for a surgical re discussion.\par She met with Dr. Keyla Tejada of medical oncology and discussed option of neoadjuvant chemotherapy.\par As per Dr. Tejada, after informed discussion, Jose Francisco is thinking to have mastectomy. She wants to proceed with surgery first and have chemotherapy after surgery. She understands that she will need to have right axillary lymph node dissection which involves more tissue removed and increased risk of lymphedema. \par \par She has also met with Dr. Priscilla Barrios of plastic surgery for consultation. She has elected to proceed with bilateral mastectomy.  Dr. Barrios feels she is a good candidate for prosthetic breast reconstruction. After a long discussion she has decided to proceed with direct-to-silicone implant; possible tissue expander with mesh, followed by silicone implant at a later date. \par \par SInce her last visit, she also underwent a PET/CT on 2021 and breast MRI on 2021 which did not reveal any additional areas of disease aside from her her right breast mass and metastatic right axillary lymph node. \par \par INTERVAL HISTORY: 10/12/21\par Jose Francisco is 71 year old female here for her FIRST POST OP apt s/p  LEFT SSM with sentinel LN bx and RIGHT SSM with axillary dissection on 21 \par \par She is feeling well\par She denies any fever/chills or erythema and / or drainage related to incision area. \par Her pain is well controlled, only complains of mild tenderness of the area.\par \par \par INTERVAL HISTORY 5/3/22\par Jose Francisco is here for her SIX MONTHS FOLLOW UP VISIT \par She finished dose-dense AC x 4. She is currently on weekly Taxol.

## 2022-05-05 ENCOUNTER — APPOINTMENT (OUTPATIENT)
Dept: INFUSION THERAPY | Facility: CLINIC | Age: 72
End: 2022-05-05

## 2022-05-06 ENCOUNTER — NON-APPOINTMENT (OUTPATIENT)
Age: 72
End: 2022-05-06

## 2022-05-11 ENCOUNTER — OUTPATIENT (OUTPATIENT)
Dept: OUTPATIENT SERVICES | Facility: HOSPITAL | Age: 72
LOS: 1 days | Discharge: HOME | End: 2022-05-11
Payer: MEDICARE

## 2022-05-11 ENCOUNTER — NON-APPOINTMENT (OUTPATIENT)
Age: 72
End: 2022-05-11

## 2022-05-11 ENCOUNTER — RESULT REVIEW (OUTPATIENT)
Age: 72
End: 2022-05-11

## 2022-05-11 VITALS
OXYGEN SATURATION: 98 % | HEART RATE: 64 BPM | WEIGHT: 138.89 LBS | TEMPERATURE: 98 F | HEIGHT: 60 IN | RESPIRATION RATE: 16 BRPM | DIASTOLIC BLOOD PRESSURE: 77 MMHG | SYSTOLIC BLOOD PRESSURE: 139 MMHG

## 2022-05-11 DIAGNOSIS — Z95.828 PRESENCE OF OTHER VASCULAR IMPLANTS AND GRAFTS: Chronic | ICD-10-CM

## 2022-05-11 DIAGNOSIS — Z01.818 ENCOUNTER FOR OTHER PREPROCEDURAL EXAMINATION: ICD-10-CM

## 2022-05-11 DIAGNOSIS — Z90.49 ACQUIRED ABSENCE OF OTHER SPECIFIED PARTS OF DIGESTIVE TRACT: Chronic | ICD-10-CM

## 2022-05-11 DIAGNOSIS — C50.411 MALIGNANT NEOPLASM OF UPPER-OUTER QUADRANT OF RIGHT FEMALE BREAST: ICD-10-CM

## 2022-05-11 DIAGNOSIS — Z90.13 ACQUIRED ABSENCE OF BILATERAL BREASTS AND NIPPLES: Chronic | ICD-10-CM

## 2022-05-11 DIAGNOSIS — Z90.710 ACQUIRED ABSENCE OF BOTH CERVIX AND UTERUS: Chronic | ICD-10-CM

## 2022-05-11 LAB
ALBUMIN SERPL ELPH-MCNC: 4.7 G/DL — SIGNIFICANT CHANGE UP (ref 3.5–5.2)
ALP SERPL-CCNC: 60 U/L — SIGNIFICANT CHANGE UP (ref 30–115)
ALT FLD-CCNC: 13 U/L — SIGNIFICANT CHANGE UP (ref 0–41)
ANION GAP SERPL CALC-SCNC: 12 MMOL/L — SIGNIFICANT CHANGE UP (ref 7–14)
APTT BLD: 31.3 SEC — SIGNIFICANT CHANGE UP (ref 27–39.2)
AST SERPL-CCNC: 18 U/L — SIGNIFICANT CHANGE UP (ref 0–41)
BASOPHILS # BLD AUTO: 0.02 K/UL — SIGNIFICANT CHANGE UP (ref 0–0.2)
BASOPHILS NFR BLD AUTO: 0.5 % — SIGNIFICANT CHANGE UP (ref 0–1)
BILIRUB SERPL-MCNC: 0.3 MG/DL — SIGNIFICANT CHANGE UP (ref 0.2–1.2)
BUN SERPL-MCNC: 24 MG/DL — HIGH (ref 10–20)
CALCIUM SERPL-MCNC: 9.9 MG/DL — SIGNIFICANT CHANGE UP (ref 8.5–10.1)
CHLORIDE SERPL-SCNC: 104 MMOL/L — SIGNIFICANT CHANGE UP (ref 98–110)
CO2 SERPL-SCNC: 25 MMOL/L — SIGNIFICANT CHANGE UP (ref 17–32)
CREAT SERPL-MCNC: 0.7 MG/DL — SIGNIFICANT CHANGE UP (ref 0.7–1.5)
EGFR: 94 ML/MIN/1.73M2 — SIGNIFICANT CHANGE UP
EOSINOPHIL # BLD AUTO: 0.07 K/UL — SIGNIFICANT CHANGE UP (ref 0–0.7)
EOSINOPHIL NFR BLD AUTO: 1.6 % — SIGNIFICANT CHANGE UP (ref 0–8)
GLUCOSE SERPL-MCNC: 94 MG/DL — SIGNIFICANT CHANGE UP (ref 70–99)
HCT VFR BLD CALC: 37.1 % — SIGNIFICANT CHANGE UP (ref 37–47)
HGB BLD-MCNC: 12.4 G/DL — SIGNIFICANT CHANGE UP (ref 12–16)
IMM GRANULOCYTES NFR BLD AUTO: 0.2 % — SIGNIFICANT CHANGE UP (ref 0.1–0.3)
INR BLD: 1 RATIO — SIGNIFICANT CHANGE UP (ref 0.65–1.3)
LYMPHOCYTES # BLD AUTO: 0.81 K/UL — LOW (ref 1.2–3.4)
LYMPHOCYTES # BLD AUTO: 18.3 % — LOW (ref 20.5–51.1)
MCHC RBC-ENTMCNC: 29.5 PG — SIGNIFICANT CHANGE UP (ref 27–31)
MCHC RBC-ENTMCNC: 33.4 G/DL — SIGNIFICANT CHANGE UP (ref 32–37)
MCV RBC AUTO: 88.3 FL — SIGNIFICANT CHANGE UP (ref 81–99)
MONOCYTES # BLD AUTO: 0.52 K/UL — SIGNIFICANT CHANGE UP (ref 0.1–0.6)
MONOCYTES NFR BLD AUTO: 11.7 % — HIGH (ref 1.7–9.3)
NEUTROPHILS # BLD AUTO: 3 K/UL — SIGNIFICANT CHANGE UP (ref 1.4–6.5)
NEUTROPHILS NFR BLD AUTO: 67.7 % — SIGNIFICANT CHANGE UP (ref 42.2–75.2)
NRBC # BLD: 0 /100 WBCS — SIGNIFICANT CHANGE UP (ref 0–0)
PLATELET # BLD AUTO: 215 K/UL — SIGNIFICANT CHANGE UP (ref 130–400)
POTASSIUM SERPL-MCNC: 4.2 MMOL/L — SIGNIFICANT CHANGE UP (ref 3.5–5)
POTASSIUM SERPL-SCNC: 4.2 MMOL/L — SIGNIFICANT CHANGE UP (ref 3.5–5)
PROT SERPL-MCNC: 6.8 G/DL — SIGNIFICANT CHANGE UP (ref 6–8)
PROTHROM AB SERPL-ACNC: 11.5 SEC — SIGNIFICANT CHANGE UP (ref 9.95–12.87)
RBC # BLD: 4.2 M/UL — SIGNIFICANT CHANGE UP (ref 4.2–5.4)
RBC # FLD: 12.7 % — SIGNIFICANT CHANGE UP (ref 11.5–14.5)
SODIUM SERPL-SCNC: 141 MMOL/L — SIGNIFICANT CHANGE UP (ref 135–146)
WBC # BLD: 4.43 K/UL — LOW (ref 4.8–10.8)
WBC # FLD AUTO: 4.43 K/UL — LOW (ref 4.8–10.8)

## 2022-05-11 PROCEDURE — 93010 ELECTROCARDIOGRAM REPORT: CPT

## 2022-05-11 PROCEDURE — 71046 X-RAY EXAM CHEST 2 VIEWS: CPT | Mod: 26

## 2022-05-11 RX ORDER — CELECOXIB 200 MG/1
300 CAPSULE ORAL
Qty: 0 | Refills: 0 | DISCHARGE

## 2022-05-11 RX ORDER — ACETAMINOPHEN 500 MG
500 TABLET ORAL
Qty: 0 | Refills: 0 | DISCHARGE

## 2022-05-11 RX ORDER — ASPIRIN/CALCIUM CARB/MAGNESIUM 324 MG
1 TABLET ORAL
Qty: 0 | Refills: 0 | DISCHARGE

## 2022-05-11 NOTE — H&P PST ADULT - HISTORY OF PRESENT ILLNESS
70 y/o female presents to PAST in preparation for removal of mediport with Dr. Ferguson in [facility] [place] on 5/23/22    Pt had port placement on 11/18/21 in IR with Dr. Roth s/p bilateral mastectomy implants lymph node dissection right side breast cancer. Pt had completed chemotherapy treatment on 4/16/22 and now would like to remove port. Pt is planned to have radiation treatment.     PATIENT CURRENTLY DENIES CHEST PAIN  SHORTNESS OF BREATH  PALPITATIONS,  DYSURIA, OR UPPER RESPIRATORY INFECTION IN PAST 2 WEEKS  EXERCISE  TOLERANCE  2-3 FLIGHT OF STAIRS  WITHOUT SHORTNESS OF BREATH  pt denies any covid s/s, or tested positive in the past  pt advised self quarantine till day of procedure  Patient verbalized understanding of instructions and was given the opportunity to ask questions and have them answered.  As per patient, this is their complete medical and surgical history, including medications both prescribed or over the counter.  written and verbal instructions with teach back on chlorhexidine shampoo provided,  pt verbalized understanding with returned demonstration    Anesthesia Alert  NO--Difficult Airway  NO--History of neck surgery or radiation  NO--Limited ROM of neck  NO--History of Malignant hyperthermia  NO--Personal or family history of Pseudocholinesterase deficiency.  NO--Prior Anesthesia Complication  NO--Latex Allergy  NO--Loose teeth  NO--History of Rheumatoid Arthritis  NO--YUNIEL  NO--Bleeding risk  NO--Other_____    C50.638 [id number]    ^I70.699 [id number]    FH: HTN (hypertension) (Father)    Family history of cervical cancer (Mother)    Family history of diabetes mellitus (DM) (Mother)    FH: Alzheimers disease (Mother)    HTN (hypertension)    Breast cancer    Cervical cancer    History of appendectomy    H/O abdominal hysterectomy    S/P bilateral mastectomy

## 2022-05-11 NOTE — H&P PST ADULT - NSICDXPASTSURGICALHX_GEN_ALL_CORE_FT
PAST SURGICAL HISTORY:  H/O abdominal hysterectomy     History of appendectomy     Port-A-Cath in place     S/P bilateral mastectomy 9/21 lymph node dissection and placement of implants

## 2022-05-11 NOTE — H&P PST ADULT - REASON FOR ADMISSION
70 y/o female presents to PAST in preparation for removal of mediport with Dr. Ferguson in [facility] [place] on 5/23/22

## 2022-05-11 NOTE — H&P PST ADULT - ADDITIONAL PE
b/l mastectomy, R arm precautions  NO ramírez  no loose teeth from neck tmd> 3 fbd  No neck or airway issues noted upon exam.

## 2022-05-12 ENCOUNTER — APPOINTMENT (OUTPATIENT)
Dept: INFUSION THERAPY | Facility: CLINIC | Age: 72
End: 2022-05-12

## 2022-05-12 ENCOUNTER — OUTPATIENT (OUTPATIENT)
Dept: OUTPATIENT SERVICES | Facility: HOSPITAL | Age: 72
LOS: 1 days | Discharge: HOME | End: 2022-05-12

## 2022-05-12 ENCOUNTER — APPOINTMENT (OUTPATIENT)
Dept: HEMATOLOGY ONCOLOGY | Facility: CLINIC | Age: 72
End: 2022-05-12
Payer: MEDICARE

## 2022-05-12 VITALS
HEIGHT: 60 IN | DIASTOLIC BLOOD PRESSURE: 77 MMHG | BODY MASS INDEX: 27.09 KG/M2 | WEIGHT: 138 LBS | SYSTOLIC BLOOD PRESSURE: 163 MMHG | TEMPERATURE: 97.2 F | HEART RATE: 61 BPM

## 2022-05-12 DIAGNOSIS — Z90.13 ACQUIRED ABSENCE OF BILATERAL BREASTS AND NIPPLES: Chronic | ICD-10-CM

## 2022-05-12 DIAGNOSIS — Z51.11 ENCOUNTER FOR ANTINEOPLASTIC CHEMOTHERAPY: ICD-10-CM

## 2022-05-12 DIAGNOSIS — Z79.811 LONG TERM (CURRENT) USE OF AROMATASE INHIBITORS: ICD-10-CM

## 2022-05-12 DIAGNOSIS — Z90.49 ACQUIRED ABSENCE OF OTHER SPECIFIED PARTS OF DIGESTIVE TRACT: Chronic | ICD-10-CM

## 2022-05-12 DIAGNOSIS — Z95.828 PRESENCE OF OTHER VASCULAR IMPLANTS AND GRAFTS: Chronic | ICD-10-CM

## 2022-05-12 DIAGNOSIS — C50.411 MALIGNANT NEOPLASM OF UPPER-OUTER QUADRANT OF RIGHT FEMALE BREAST: ICD-10-CM

## 2022-05-12 DIAGNOSIS — Z90.710 ACQUIRED ABSENCE OF BOTH CERVIX AND UTERUS: Chronic | ICD-10-CM

## 2022-05-12 PROCEDURE — 99214 OFFICE O/P EST MOD 30 MIN: CPT

## 2022-05-15 NOTE — ASSESSMENT
[FreeTextEntry1] : 70 yo female has stage IIIA ([id number]) right breast multifocal invasive poorly differentiated micropapillary carcinoma, 3.7 cm and invasive well differentiated tubulo-lobular variant of ductal carcinoma, 0.8 cm, ER/WI positive, Her-2 negative, s/p right breast modified radical skin-sparing mastectomy, right ALND, left breast simple skin-sparing mastectomy and left axillary SLNB with immediate direct to implant reconstruction. 8/12 right axillary lymph nodes are positive for metastatic carcinoma with extensive extracapsular extension.\par Oncotype RS is 27. \par Staging PET/CT on 8/16/21 was negative for distant mets. \par \par The left breast has atypical lobular hyperplasia. \par \par Assessment and Plan:\par -- S/P AC x4 and Taxol x 14 completed on 4/14/22\par -- TTE on 9/30/21 showed normal LV Ejection Fraction of 66 %. Echo also reported moderately enlarged right ventricle, mild-moderate tricuspid regurgitation, mild to moderate pulmonic valve regurgitation. Discussed with Dr. Robert, cardiology. He compared patient's current and previous Echo. There is only mild enlarged right ventricle. She saw Dr. Robert and had repeat 2D Echo. \par -- Regarding to positive margin, she followed up with Dr. Ferguson and is planned for MRI of the breast on 5/16/22.\par -- Will see Dr Bagley for adjuvant PMRT.  Will task Navigator to assist with scheduling.\par -- Discussed to start adjuvant endocrine therapy. Letrozole was recommended. We discussed the side effects of Letrozole which may include but not limit to muscular and skeletal aching, arthralgia, loss of bone density, osteopenia and osteoporosis, a small increase risk of cardiovascular events and slightly increase of hyperlipidemia. She was advised to take Vitamin D and calcium while on AI. In light of 8 positive lymph nodes and KI 67 20-25%, she will be benefit from addition of CDK 4/6 inhibitor Abemaciclib along with adjuvant endocrine therapy. Will discuss with her in more detail after she completes RT. \par -- She is due for baseline Bone density, script given.\par -- She is scheduled for port removal on 5/23/22 so will cancel port flush today.\par -- RTO for f/u in 2 months.\par \par Case was seen and discussed with Dr. Tejada who agreed with assessment and plan.\par  \par \par \par \par

## 2022-05-15 NOTE — HISTORY OF PRESENT ILLNESS
[de-identified] : JOSE FRANCISCO THOMAS is a 71 year old female PMHx of cervical cancer s/p hysterectomy and unilateral salpingo-oophorectomy,  HTN presents for newly diagnosed invasive breast cancer. \par \par Patient first palpated a mass in the RUOQ herself within the past year. She had pain at the site and feels that it is growing. She went for b/l diagnostic mammo and sonogram on 6/17/2021. Her left breast had no suspicious findings however on her right breast a spiculated mass in the RUOQ as well as a second mass superior to with a thickened axillary 2.2 cm axillary node was seen. \par -breasts are heterogeneously dense\par -R: UOQ, spiculated mass, with calcifications, c/w US finding below \par -R: Medial to this mass in the posterior depth, an area of architectural distortion. \par -R: Slightly superior to this mass, there is a an additional circumscribed mass also seen on concurrent ultrasound.\par Right breast:\par - In the axilla, at the 10 to 11:00 position 12 cm from the nipple, there is a cortically thickened axillary lymph node measuring 2.2 cm --> BIOPSY \par -Corresponding to the area palpable concern at the 10 to 11:00 position 5 to 8 cm from the nipple, there is a spiculated mass with associated coarse calcifications measuring 2.7 x 1.2 x 1.8 cm --> BIOPSY \par -At the 11:00 position 10 cm from the nipple, there is a circumscribed hypoechoic mass measuring 1.3 x 1.2 x 0.4 cm, corresponding to mammographic findings --> BIOPSY \par -At the 6:00 position 5 cm from the nipple, there is a circumscribed hypoechoic mass measuring 0.3 x 0.3 x 0.2 cm.\par -At the 6:00 position 4 cm from the nipple, there is a circumscribed hypoechoic mass measuring 0.6 x 0.6 x 0.2 cm --> BIOPSY \par BI-RADS Category 5: Highly Suggestive of Malignancy\par \par On 7/21/2021, she went for US guided biopsy of  the 2cm mass which showed invasive moderately differential ductal carcinoma, with dominant micrpapillary features\par 1. Breast, right 10-11 N5-8 mass, ultrasound guided needle core\par biopsies:\par - Invasive moderately differentiated ductal carcinoma with dominant micropapillary features, ER 99%, MT 15%. Ki67 20%, Her 2 negative (IHC 1+, Her2/CEP17 1.9)\par - Ductal carcinoma in-situ (DCIS), cribriform type with comedo\par necrosis and microcalcifications, intermediate nuclear grade.\par - Foci of lymphovascular invasion are present.\par \par 2. Breast, right 11 N10 mass, ultrasound guided needle core biopsies:\par - Benign atrophic fatty breast tissue with a dominant macrocyst wall fragment demonstrating an attenuated epithelial lining; consistent with a dilated duct.\par \par 3. Breast, right 6 N4 mass, ultrasound guided needle core biopsies:\par - Hyalinized fibroadenoma.\par \par 4. Breast, right axillary mass, ultrasound guided needle core biopsies:\par - Lymph node fragments containing metastatic carcinoma (1/1), the largest contiguous focus of which measures 6.0 mm (microscopic measurement).\par - No extracapsular extension is identified in this biopsy material.\par \par On 8/16/21, she had a PET/CT which showed pathologic FDG uptake (SUV 7) coregistering with 3.6 x 2 cm right breast mass and pathologic FDG uptake in 2 cm right axillary nodule (SUV 6.1) consistent with documented biologic tumor activity. No other sites of abnormal FDG uptake\par \par On 8/18/21, she had b/l breast MRI MRI which showed the biopsy proven right breast carcinoma 1.9 x 3.7 x 3.5 cm and 2 cm right axillary adenopathy. No additional suspicious enhancement in either breast. \par \par She saw Dr. Ferguson for surgical consultation. She was given options to have neoadjuvant chemo followed by surgery or immediate surgery. \par \par Her family history is significant for breast cancer in her paternal aunt in her 40s,  2 uncles who had "brain cancer"; and her mom who had cervical cancer. \par  [de-identified] : 10/27/21:\dharmesh Galicia is here today for followup visit. She was previously seen on 8/20/21. She was diagnosed with  IDC of the right breast with 3 cm mass and positive right axillary lymph node, ER/MS positive, Her-2 negative. Clinical stage is T3N1 with Oncotype RS 27. She was given an option to have neoadjuvant chemotherapy prior to surgery but she opted to have surgery first. On 9/29/21, she underwent right breast modified radical skin-sparing mastectomy, right ALND, left breast simple skin-sparing mastectomy and left axillary SLNB with immediate direct to implant reconstruction. \par - The right mastectomy revealed 3.7 cm invasive poorly differentiated micropapillary carcinoma, ER 85%, MS 15%, Her-2 negative (FISH ration 1.8), Ki 67 20-25% and 0.8 cm invasive well differentiated tubulo-lobular variant of ductal carcinoma, %, MS 90%, Her-2 negative and Ki 67 3-5%,  Non-extensive ductal carcinoma in-situ (DCIS), cribriform and micropapillary types with comedo necrosis and microcalcifications, intermediate to focally high nuclear grade. Numerous foci of lymphovascular invasion are present. The invasive tumor extends to focally involve (touches both ink and cautery) the blue inked surface. The nipple, skin, and deep fascial margin are negative for carcinoma. Right ALND reveal metastatic carcinoma present in eight out of twelve axillary lymph nodes (8/12) with extensive extracapsular extension seen. The largest node contains a metallic "ring-shaped" biopsy clip and shows focal prior biopsy site changes.\par AJCC 8th Edition Pathologic Stage, m(2)pT2, pN2a, pMx.\par - The left breast simple skin-sparing mastectomy show atrophic predominantly fatty breast tissue with focal atypical lobular hyperplasia (ALH). 2 sentinel lymph noses are negative for malignancy.  \par She recovered well from surgery and is here today to discuss adjuvant systemic therapy. \par \par 12/2/21\dharmesh Galicia is here today for followup visit and chemotherapy. She has stage IIIA ([id number]) right breast multifocal invasive poorly differentiated micropapillary carcinoma, 3.7 cm and invasive well differentiated tubulo-lobular variant of ductal carcinoma, 0.8 cm, ER/MS positive, Her-2 negative, s/p right breast modified radical skin-sparing mastectomy, right ALND, left breast simple skin-sparing mastectomy and left axillary SLNB with immediate implant reconstruction. 8/12 right axillary lymph nodes are positive for metastatic carcinoma with extensive extracapsular extension. Oncotype RS is 27. She is indicated for adjuvant chemotherapy.  She had port a cath placed on 11/18 to left chest wall and felt pain at port site with movement of left upper extremity. She followed up with IR regarding concerns, no evidence of port infection, or blockage/clot within port catheter. She also had HOMA done on 9/30 LV EF normal. \par \par 12/16/21\dharmesh Galicia is here today for followup visit and chemotherapy. She has stage IIIA ([id number]) right breast multifocal invasive poorly differentiated micropapillary carcinoma, 3.7 cm and invasive well differentiated tubulo-lobular variant of ductal carcinoma, 0.8 cm, ER/MS positive, Her-2 negative, s/p right breast modified radical skin-sparing mastectomy, right ALND, left breast simple skin-sparing mastectomy and left axillary SLNB with immediate implant reconstruction. 8/12 right axillary lymph nodes are positive for metastatic carcinoma with extensive extracapsular extension. Oncotype RS is 27. She started adjuvant chemotherapy with dose-dense AC. To date she received 1 cycle of AC, tolerating well. She denies nausea, vomiting, diarrhea or breast pain. She c/o that her constipation is worse from her baseline, taking Senna twice daily.\par She also had HOMA done on 9/30/21 LV EF normal. \par \par 12/30/21\dharemsh Galicia is here today for followup visit and chemotherapy. She has stage IIIA ([id number]) right breast multifocal invasive poorly differentiated micropapillary carcinoma, 3.7 cm and invasive well differentiated tubulo-lobular variant of ductal carcinoma, 0.8 cm, ER/MS positive, Her-2 negative, s/p right breast modified radical skin-sparing mastectomy, right ALND, left breast simple skin-sparing mastectomy and left axillary SLNB with immediate implant reconstruction. 8/12 right axillary lymph nodes are positive for metastatic carcinoma with extensive extracapsular extension. She started adjuvant chemotherapy with dose-dense AC. To date she received 2 cycles of AC, tolerating well. She denies nausea, vomiting, diarrhea or breast pain. Her constipation has improved with senna 3 tablets and colace. She also had HOMA done on 9/30/21 LV EF normal. \par \par 1/13/22:jossie Galicia is here today for followup visit and chemotherapy. She has stage IIIA ([id number]) right breast multifocal invasive poorly differentiated micropapillary carcinoma, 3.7 cm and invasive well differentiated tubulo-lobular variant of ductal carcinoma, 0.8 cm, ER/MS positive, Her-2 negative, s/p right breast modified radical skin-sparing mastectomy, right ALND, left breast simple skin-sparing mastectomy and left axillary SLNB with immediate implant reconstruction. 8/12 right axillary lymph nodes are positive for metastatic carcinoma with extensive extracapsular extension. She has been on adjuvant chemotherapy with dose-dense AC. To date she received 3 cycles of AC, tolerating well. She saw Dr. Robert and had repeat 2D Echo. \par \par 1/27/22:jossie Galicia is here today for followup visit and chemotherapy. She has stage IIIA ([id number]) right breast multifocal invasive poorly differentiated micropapillary carcinoma, 3.7 cm and invasive well differentiated tubulo-lobular variant of ductal carcinoma, 0.8 cm, ER/MS positive, Her-2 negative, s/p right breast modified radical skin-sparing mastectomy, right ALND, left breast simple skin-sparing mastectomy and left axillary SLNB with immediate implant reconstruction. 8/12 right axillary lymph nodes are positive for metastatic carcinoma with extensive extracapsular extension. She has been on adjuvant chemotherapy. She finished dose-dense AC x 4. She reported white coating on oral mucosa. She was given Nystatin swishing and spiting. She is feeling better now. She saw Dr. Robert and had repeat 2D Echo. \par \par 2/24/22jossie Galicia is here today for followup visit and chemotherapy. She has stage IIIA ([id number]) right breast multifocal invasive poorly differentiated micropapillary carcinoma, 3.7 cm and invasive well differentiated tubulo-lobular variant of ductal carcinoma, 0.8 cm, ER/MS positive, Her-2 negative, s/p right breast modified radical skin-sparing mastectomy, right ALND, left breast simple skin-sparing mastectomy and left axillary SLNB with immediate implant reconstruction. 8/12 right axillary lymph nodes are positive for metastatic carcinoma with extensive extracapsular extension. She has been on adjuvant chemotherapy. She finished dose-dense AC x 4. She is currently on weekly Taxol. Today, she is scheduled for 5/12 Taxol.  She c/o bloatedness, hearing loss, anxiety, nervousness, feeling agitated.  She is not happy about gaining 5 lbs in 1 month.  Denies fever, N/V/C/D, paresthesias.\par \par 3/24/22:jossie Floresmary is here today for followup visit and chemotherapy. She has stage IIIA ([id number]) right breast multifocal invasive poorly differentiated micropapillary carcinoma, 3.7 cm and invasive well differentiated tubulo-lobular variant of ductal carcinoma, 0.8 cm, ER/MS positive, Her-2 negative, s/p right breast modified radical skin-sparing mastectomy, right ALND, left breast simple skin-sparing mastectomy and left axillary SLNB with immediate implant reconstruction. 8/12 right axillary lymph nodes are positive for metastatic carcinoma with extensive extracapsular extension. She has been on adjuvant chemotherapy. She finished dose-dense AC x 4. She is currently on weekly Taxol. Today, she is scheduled for 9/12 Taxol.  She reports chemotherapy-related nail changes. \par \par 4/14/22:jossie Galicia is here today for followup visit and chemotherapy. She has stage IIIA ([id number]) right breast multifocal invasive poorly differentiated micropapillary carcinoma, 3.7 cm and invasive well differentiated tubulo-lobular variant of ductal carcinoma, 0.8 cm, ER/MS positive, Her-2 negative, s/p right breast modified radical skin-sparing mastectomy, right ALND, left breast simple skin-sparing mastectomy and left axillary SLNB with immediate implant reconstruction. 8/12 right axillary lymph nodes are positive for metastatic carcinoma with extensive extracapsular extension. She has been on adjuvant chemotherapy. She finished dose-dense AC x 4. She is currently on weekly Taxol. Today, she is scheduled for last treatment (week 12 Taxol).  \par \par 5/12/2022jossie Galicia is here today for followup visit. She has stage IIIA ([id number]) right breast multifocal invasive poorly differentiated micropapillary carcinoma, 3.7 cm and invasive well differentiated tubulo-lobular variant of ductal carcinoma, 0.8 cm, ER/MS positive, Her-2 negative, s/p right breast modified radical skin-sparing mastectomy, right ALND, left breast simple skin-sparing mastectomy and left axillary SLNB with immediate implant reconstruction. 8/12 right axillary lymph nodes are positive for metastatic carcinoma with extensive extracapsular extension. She completed adjuvant chemotherapy with dose-dense AC x 4 followed by weekly Taxol x 12. She followed up with Dr Ferguson and is planned for port removal on 5/23/22 and MRI breast on 5/16/22. She has not started Letrozole but has meds available at home. She is due for baseline DEXA scan.

## 2022-05-15 NOTE — PHYSICAL EXAM
[Restricted in physically strenuous activity but ambulatory and able to carry out work of a light or sedentary nature] : Status 1- Restricted in physically strenuous activity but ambulatory and able to carry out work of a light or sedentary nature, e.g., light house work, office work [Normal] : affect appropriate [de-identified] : S/P b/l mastectomy and implant reconstruction. Surgical incisions are healing well. There is no palpable abnormality.  [de-identified] : port-a-cath placed to left chest wall, intact, no erythema

## 2022-05-16 ENCOUNTER — RESULT REVIEW (OUTPATIENT)
Age: 72
End: 2022-05-16

## 2022-05-16 ENCOUNTER — OUTPATIENT (OUTPATIENT)
Dept: OUTPATIENT SERVICES | Facility: HOSPITAL | Age: 72
LOS: 1 days | Discharge: HOME | End: 2022-05-16
Payer: MEDICARE

## 2022-05-16 ENCOUNTER — OUTPATIENT (OUTPATIENT)
Dept: OUTPATIENT SERVICES | Facility: HOSPITAL | Age: 72
LOS: 1 days | Discharge: HOME | End: 2022-05-16

## 2022-05-16 DIAGNOSIS — Z90.49 ACQUIRED ABSENCE OF OTHER SPECIFIED PARTS OF DIGESTIVE TRACT: Chronic | ICD-10-CM

## 2022-05-16 DIAGNOSIS — Z90.710 ACQUIRED ABSENCE OF BOTH CERVIX AND UTERUS: Chronic | ICD-10-CM

## 2022-05-16 DIAGNOSIS — Z90.13 ACQUIRED ABSENCE OF BILATERAL BREASTS AND NIPPLES: Chronic | ICD-10-CM

## 2022-05-16 DIAGNOSIS — Z95.828 PRESENCE OF OTHER VASCULAR IMPLANTS AND GRAFTS: Chronic | ICD-10-CM

## 2022-05-16 DIAGNOSIS — C50.911 MALIGNANT NEOPLASM OF UNSPECIFIED SITE OF RIGHT FEMALE BREAST: ICD-10-CM

## 2022-05-16 DIAGNOSIS — C50.411 MALIGNANT NEOPLASM OF UPPER-OUTER QUADRANT OF RIGHT FEMALE BREAST: ICD-10-CM

## 2022-05-16 PROCEDURE — 77049 MRI BREAST C-+ W/CAD BI: CPT | Mod: 26

## 2022-05-17 DIAGNOSIS — Z78.0 ASYMPTOMATIC MENOPAUSAL STATE: ICD-10-CM

## 2022-05-17 DIAGNOSIS — Z13.820 ENCOUNTER FOR SCREENING FOR OSTEOPOROSIS: ICD-10-CM

## 2022-05-17 DIAGNOSIS — M89.9 DISORDER OF BONE, UNSPECIFIED: ICD-10-CM

## 2022-05-20 ENCOUNTER — LABORATORY RESULT (OUTPATIENT)
Age: 72
End: 2022-05-20

## 2022-05-20 NOTE — ASU PATIENT PROFILE, ADULT - FALL HARM RISK - UNIVERSAL INTERVENTIONS
Bed in lowest position, wheels locked, appropriate side rails in place/Call bell, personal items and telephone in reach/Instruct patient to call for assistance before getting out of bed or chair/Non-slip footwear when patient is out of bed/[place] to call system/Physically safe environment - no spills, clutter or unnecessary equipment/Purposeful Proactive Rounding/Room/bathroom lighting operational, light cord in reach

## 2022-05-23 ENCOUNTER — APPOINTMENT (OUTPATIENT)
Dept: BREAST CENTER | Facility: AMBULATORY SURGERY CENTER | Age: 72
End: 2022-05-23
Payer: MEDICARE

## 2022-05-23 ENCOUNTER — OUTPATIENT (OUTPATIENT)
Dept: OUTPATIENT SERVICES | Facility: HOSPITAL | Age: 72
LOS: 1 days | Discharge: HOME | End: 2022-05-23
Payer: MEDICARE

## 2022-05-23 ENCOUNTER — TRANSCRIPTION ENCOUNTER (OUTPATIENT)
Age: 72
End: 2022-05-23

## 2022-05-23 ENCOUNTER — RESULT REVIEW (OUTPATIENT)
Age: 72
End: 2022-05-23

## 2022-05-23 VITALS
TEMPERATURE: 98 F | SYSTOLIC BLOOD PRESSURE: 152 MMHG | WEIGHT: 138.89 LBS | HEART RATE: 64 BPM | RESPIRATION RATE: 18 BRPM | OXYGEN SATURATION: 99 % | DIASTOLIC BLOOD PRESSURE: 68 MMHG | HEIGHT: 60 IN

## 2022-05-23 VITALS
OXYGEN SATURATION: 96 % | RESPIRATION RATE: 19 BRPM | SYSTOLIC BLOOD PRESSURE: 144 MMHG | DIASTOLIC BLOOD PRESSURE: 75 MMHG | HEART RATE: 68 BPM

## 2022-05-23 DIAGNOSIS — Z90.49 ACQUIRED ABSENCE OF OTHER SPECIFIED PARTS OF DIGESTIVE TRACT: Chronic | ICD-10-CM

## 2022-05-23 DIAGNOSIS — Z95.828 PRESENCE OF OTHER VASCULAR IMPLANTS AND GRAFTS: Chronic | ICD-10-CM

## 2022-05-23 DIAGNOSIS — Z90.710 ACQUIRED ABSENCE OF BOTH CERVIX AND UTERUS: Chronic | ICD-10-CM

## 2022-05-23 DIAGNOSIS — Z90.13 ACQUIRED ABSENCE OF BILATERAL BREASTS AND NIPPLES: Chronic | ICD-10-CM

## 2022-05-23 PROCEDURE — 88302 TISSUE EXAM BY PATHOLOGIST: CPT | Mod: 26

## 2022-05-23 PROCEDURE — 88300 SURGICAL PATH GROSS: CPT | Mod: 26,59

## 2022-05-23 PROCEDURE — 36590 REMOVAL TUNNELED CV CATH: CPT

## 2022-05-23 RX ORDER — HYDROMORPHONE HYDROCHLORIDE 2 MG/ML
0.5 INJECTION INTRAMUSCULAR; INTRAVENOUS; SUBCUTANEOUS
Refills: 0 | Status: DISCONTINUED | OUTPATIENT
Start: 2022-05-23 | End: 2022-05-23

## 2022-05-23 RX ORDER — ACETAMINOPHEN 500 MG
2 TABLET ORAL
Qty: 24 | Refills: 0
Start: 2022-05-23 | End: 2022-05-25

## 2022-05-23 RX ORDER — OXYCODONE AND ACETAMINOPHEN 5; 325 MG/1; MG/1
2 TABLET ORAL ONCE
Refills: 0 | Status: DISCONTINUED | OUTPATIENT
Start: 2022-05-23 | End: 2022-05-23

## 2022-05-23 RX ORDER — CANDESARTAN CILEXETIL 8 MG/1
2 TABLET ORAL
Qty: 0 | Refills: 0 | DISCHARGE

## 2022-05-23 RX ORDER — SODIUM CHLORIDE 9 MG/ML
1000 INJECTION, SOLUTION INTRAVENOUS
Refills: 0 | Status: DISCONTINUED | OUTPATIENT
Start: 2022-05-23 | End: 2022-06-06

## 2022-05-23 RX ORDER — METOPROLOL TARTRATE 50 MG
1 TABLET ORAL
Qty: 0 | Refills: 0 | DISCHARGE

## 2022-05-23 RX ORDER — ONDANSETRON 8 MG/1
4 TABLET, FILM COATED ORAL ONCE
Refills: 0 | Status: DISCONTINUED | OUTPATIENT
Start: 2022-05-23 | End: 2022-06-06

## 2022-05-23 RX ADMIN — HYDROMORPHONE HYDROCHLORIDE 0.5 MILLIGRAM(S): 2 INJECTION INTRAMUSCULAR; INTRAVENOUS; SUBCUTANEOUS at 09:21

## 2022-05-23 RX ADMIN — OXYCODONE AND ACETAMINOPHEN 2 TABLET(S): 5; 325 TABLET ORAL at 09:41

## 2022-05-23 RX ADMIN — HYDROMORPHONE HYDROCHLORIDE 0.5 MILLIGRAM(S): 2 INJECTION INTRAMUSCULAR; INTRAVENOUS; SUBCUTANEOUS at 08:38

## 2022-05-23 RX ADMIN — SODIUM CHLORIDE 75 MILLILITER(S): 9 INJECTION, SOLUTION INTRAVENOUS at 08:34

## 2022-05-23 RX ADMIN — HYDROMORPHONE HYDROCHLORIDE 0.5 MILLIGRAM(S): 2 INJECTION INTRAMUSCULAR; INTRAVENOUS; SUBCUTANEOUS at 08:27

## 2022-05-23 NOTE — ASU PREOP CHECKLIST - HOW ADMINISTERED
The patient/family was/were informed of limited nature of the exam. Representative images were printed to be scanned into the chart or directly uploaded into the medical record.
Self Administrated

## 2022-05-23 NOTE — ASU DISCHARGE PLAN (ADULT/PEDIATRIC) - CARE PROVIDER_API CALL
Maya Ferguson)  Surgery  [facility], [address] [place] [address]  Phone: [phone]  Fax: [phone]  Scheduled Appointment: 06/02/2022 02:30 PM

## 2022-05-23 NOTE — CHART NOTE - NSCHARTNOTEFT_GEN_A_CORE
PACU ANESTHESIA ADMISSION NOTE      Procedure: Removal of chemotherapy port      Post op diagnosis:  Cancer of right breast        ____  Intubated  TV:______       Rate: ______      FiO2: ______    ___x_  Patent Airway    ____  Full return of protective reflexes    ____  Full recovery from anesthesia / back to baseline status    Vitals:  T(C): 36.7   HR: 64   BP: 152/68   RR: 18  SpO2: 99%     Mental Status:  x____ Awake   _____ Alert   _____ Drowsy   _____ Sedated    Nausea/Vomiting:  ____ Yes, See Post - Op Orders      ___x_ No    Pain Scale (0-10):  _____    Treatment: ____ None    __x__ See Post - Op/PCA Orders    Post - Operative Fluids:   ____ Oral   __x__ See Post - Op Orders    Plan: Discharge:   _x___Home       _____Floor     _____Critical Care    _____  Other:_________________    Comments:  report given to RN DC to pacu

## 2022-05-23 NOTE — PRE-ANESTHESIA EVALUATION ADULT - NSATTENDATTESTRD_GEN_ALL_CORE
The patient has been re-examined and I agree with the above assessment or I updated with my findings. 20-May-2021 04:53

## 2022-05-23 NOTE — ASU DISCHARGE PLAN (ADULT/PEDIATRIC) - NS MD DC FALL RISK RISK
For information on Fall & Injury Prevention, visit: [email or web address] OR  [email or web address] OR  https://www.cdc.gov/steadi/patient.html

## 2022-05-23 NOTE — ASU DISCHARGE PLAN (ADULT/PEDIATRIC) - ASU DC SPECIAL INSTRUCTIONSFT
Call dr. Ferguson's office and confirm you scheduled follow up appointment on 6/2 2:30 PM. Take Tylenol for pain as directed.

## 2022-05-26 DIAGNOSIS — Z45.2 ENCOUNTER FOR ADJUSTMENT AND MANAGEMENT OF VASCULAR ACCESS DEVICE: ICD-10-CM

## 2022-05-26 DIAGNOSIS — Z90.13 ACQUIRED ABSENCE OF BILATERAL BREASTS AND NIPPLES: ICD-10-CM

## 2022-05-26 DIAGNOSIS — I10 ESSENTIAL (PRIMARY) HYPERTENSION: ICD-10-CM

## 2022-05-26 DIAGNOSIS — Z85.3 PERSONAL HISTORY OF MALIGNANT NEOPLASM OF BREAST: ICD-10-CM

## 2022-05-26 LAB — SURGICAL PATHOLOGY STUDY: SIGNIFICANT CHANGE UP

## 2022-06-01 ENCOUNTER — APPOINTMENT (OUTPATIENT)
Dept: RADIATION ONCOLOGY | Facility: HOSPITAL | Age: 72
End: 2022-06-01
Payer: MEDICARE

## 2022-06-01 ENCOUNTER — OUTPATIENT (OUTPATIENT)
Dept: OUTPATIENT SERVICES | Facility: HOSPITAL | Age: 72
LOS: 1 days | End: 2022-06-01
Payer: MEDICARE

## 2022-06-01 ENCOUNTER — APPOINTMENT (OUTPATIENT)
Dept: PLASTIC SURGERY | Facility: HOSPITAL | Age: 72
End: 2022-06-01

## 2022-06-01 VITALS
WEIGHT: 135.38 LBS | HEART RATE: 67 BPM | DIASTOLIC BLOOD PRESSURE: 73 MMHG | TEMPERATURE: 97.3 F | SYSTOLIC BLOOD PRESSURE: 156 MMHG | BODY MASS INDEX: 26.44 KG/M2 | OXYGEN SATURATION: 98 % | RESPIRATION RATE: 16 BRPM

## 2022-06-01 DIAGNOSIS — Z95.828 PRESENCE OF OTHER VASCULAR IMPLANTS AND GRAFTS: Chronic | ICD-10-CM

## 2022-06-01 DIAGNOSIS — Z90.49 ACQUIRED ABSENCE OF OTHER SPECIFIED PARTS OF DIGESTIVE TRACT: Chronic | ICD-10-CM

## 2022-06-01 DIAGNOSIS — Z90.710 ACQUIRED ABSENCE OF BOTH CERVIX AND UTERUS: Chronic | ICD-10-CM

## 2022-06-01 DIAGNOSIS — Z90.13 ACQUIRED ABSENCE OF BILATERAL BREASTS AND NIPPLES: Chronic | ICD-10-CM

## 2022-06-01 DIAGNOSIS — Z84.89 FAMILY HISTORY OF OTHER SPECIFIED CONDITIONS: ICD-10-CM

## 2022-06-01 PROCEDURE — 77263 THER RADIOLOGY TX PLNG CPLX: CPT

## 2022-06-01 PROCEDURE — 99205 OFFICE O/P NEW HI 60 MIN: CPT | Mod: 25

## 2022-06-01 RX ORDER — GABAPENTIN 100 MG/1
100 CAPSULE ORAL 3 TIMES DAILY
Qty: 90 | Refills: 1 | Status: DISCONTINUED | COMMUNITY
Start: 2021-11-15 | End: 2022-06-01

## 2022-06-01 RX ORDER — AMOXICILLIN AND CLAVULANATE POTASSIUM 875; 125 MG/1; MG/1
875-125 TABLET, COATED ORAL
Qty: 14 | Refills: 0 | Status: DISCONTINUED | COMMUNITY
Start: 2021-10-11 | End: 2022-06-01

## 2022-06-01 RX ORDER — DOCUSATE SODIUM 100 MG/1
100 CAPSULE ORAL
Qty: 60 | Refills: 2 | Status: DISCONTINUED | COMMUNITY
Start: 2021-12-16 | End: 2022-06-01

## 2022-06-01 RX ORDER — NYSTATIN 100000 [USP'U]/ML
100000 SUSPENSION ORAL 3 TIMES DAILY
Qty: 210 | Refills: 0 | Status: DISCONTINUED | COMMUNITY
Start: 2022-01-25 | End: 2022-06-01

## 2022-06-01 RX ORDER — GABAPENTIN 300 MG/1
300 CAPSULE ORAL
Qty: 7 | Refills: 0 | Status: DISCONTINUED | COMMUNITY
Start: 2021-09-21 | End: 2022-06-01

## 2022-06-01 RX ORDER — LIDOCAINE AND PRILOCAINE 25; 25 MG/G; MG/G
2.5-2.5 CREAM TOPICAL
Qty: 1 | Refills: 3 | Status: DISCONTINUED | COMMUNITY
Start: 2021-12-02 | End: 2022-06-01

## 2022-06-01 RX ORDER — SENNOSIDES 8.6 MG TABLETS 8.6 MG/1
8.6 TABLET ORAL
Qty: 24 | Refills: 0 | Status: DISCONTINUED | COMMUNITY
Start: 2021-12-06 | End: 2022-06-01

## 2022-06-01 RX ORDER — GABAPENTIN 100 MG/1
100 CAPSULE ORAL 3 TIMES DAILY
Qty: 21 | Refills: 1 | Status: DISCONTINUED | COMMUNITY
Start: 2021-10-25 | End: 2022-06-01

## 2022-06-01 RX ORDER — CELECOXIB 400 MG/1
400 CAPSULE ORAL
Qty: 7 | Refills: 0 | Status: DISCONTINUED | COMMUNITY
Start: 2021-09-21 | End: 2022-06-01

## 2022-06-01 RX ORDER — CANDESARTAN CILEXETIL 8 MG/1
8 TABLET ORAL
Refills: 0 | Status: DISCONTINUED | COMMUNITY
End: 2022-06-01

## 2022-06-01 NOTE — HISTORY OF PRESENT ILLNESS
[FreeTextEntry1] : The patient is a 72 year  old woman who self palpated a right breast mass.  Diagnostic mammogram and ultrasound on 6/17/21 showed:\par -breasts are heterogeneously dense\par -R: UOQ, spiculated mass, with calcifications, c/w US finding below \par -R: Medial to this mass in the posterior depth, an area of architectural distortion. \par -R: Slightly superior to this mass, there is a an additional circumscribed mass also seen on concurrent ultrasound.\par Right breast:\par - In the axilla, at the 10 to 11:00 position 12 cm from the nipple, there is a cortically thickened axillary lymph node measuring 2.2 cm --> BIOPSY \par -Corresponding to the area palpable concern at the 10 to 11:00 position 5 to 8 cm from the nipple, there is a spiculated mass with associated coarse calcifications measuring 2.7 x 1.2 x 1.8 cm --> BIOPSY \par -At the 11:00 position 10 cm from the nipple, there is a circumscribed hypoechoic mass measuring 1.3 x 1.2 x 0.4 cm, corresponding to mammographic findings --> BIOPSY \par -At the 6:00 position 5 cm from the nipple, there is a circumscribed hypoechoic mass measuring 0.3 x 0.3 x 0.2 cm.\par -At the 6:00 position 4 cm from the nipple, there is a circumscribed hypoechoic mass measuring 0.6 x 0.6 x 0.2 cm --> BIOPSY \par Left breast:\par No suspicious solid or cystic masses. No axillary adenopathy.\par BI-RADS Category 5: Highly Suggestive of Malignancy.  \par \par 07/21/2021 - US Guided Core Bx:\par 1. Right, 10-11:00 N5-8, 2.7cm: (top-hat)\par - Invasive moderately differentiated ductal carcinoma with dominant micropapillary features.\par - Ductal carcinoma in-situ (DCIS), cribriform type with comedo necrosis and microcalcifications, intermediate nuclear grade.\par - Foci of lymphovascular invasion are present.\par -ER (+) 99%\par -AL (+) 15%\par -HER2 (-) on IHC\par -Ki-67 20%\par \par 2. Right, 11:00 N10, 1.3cm: (mini-cork)\par - Benign atrophic fatty breast tissue with a dominant macrocyst\par wall fragment demonstrating an attenuated epithelial lining;\par consistent with a dilated duct.\par Findings are benign concordant.\par \par 3. Right, 6:00 N4, 0.6cm: (stoplight clip) \par - Hyalinized fibroadenoma.\par Findings are benign concordant.\par \par 4. Right, axillary mass 10-12:00 N12, 2.2cm: (twirl)\par - Lymph node fragments containing metastatic carcinoma (1/1),\par the largest contiguous focus of which measures 6.0 mm\par (microscopic measurement).\par - No extracapsular extension is identified in this biopsy material. Findings are malignant concordant.\par \par 9/29/2021 -- b/l SSM, R ALND, L SLN Bx with immediate reconstruction \par 1. 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 \par \par She subsequently received chemotherapy: ACx 4 plus T x 12. Last chemo was about 6 weeks ago.      \par \par She has been doing well since surgery and chemo.  She does have bilateral decreased ROM in upper extremities.  She  is here to discuss radiation. \par \par Med/Onc: Dr. Tejada\par Oncotype:   27\par \par

## 2022-06-01 NOTE — REVIEW OF SYSTEMS
[Patient Intake Form Reviewed] : Patient intake form was reviewed [Negative] : Allergic/Immunologic [FreeTextEntry2] : sweats [FreeTextEntry3] : glasses [FreeTextEntry4] : hoarseness [FreeTextEntry5] : cold hands or feet [de-identified] : itching

## 2022-06-01 NOTE — DISEASE MANAGEMENT
[Pathological] : TNM Stage: p [FreeTextEntry4] : Invasive moderately differentiated ductal carcinoma of the right breast, ER/FL positive, HER 2 negative [TTNM] : 2 [NTNM] : 2 [MTNM] : 0 [IB] : IB

## 2022-06-01 NOTE — VITALS
[Maximal Pain Intensity: 5/10] : 5/10 [Least Pain Intensity: 0/10] : 0/10 [Pain Location: ___] : Pain Location: [unfilled] [OTC] : OTC [80: Normal activity with effort; some signs or symptoms of disease.] : 80: Normal activity with effort; some signs or symptoms of disease.

## 2022-06-01 NOTE — LETTER CLOSING
[Consult Closing:] : Thank you for allowing me to participate in the care of this patient.  If you have any questions, please do not hesitate to contact me. [Sincerely yours,] : Sincerely yours, [FreeTextEntry3] : Ana Bagley M.D. \par \par Electronically proofread and signed by:  Ana Bagley MD\par Attending, Department of Radiation Medicine\par [facility]\par \par CC: Dr. Tejada

## 2022-06-02 ENCOUNTER — APPOINTMENT (OUTPATIENT)
Dept: BREAST CENTER | Facility: CLINIC | Age: 72
End: 2022-06-02
Payer: MEDICARE

## 2022-06-02 VITALS
DIASTOLIC BLOOD PRESSURE: 83 MMHG | HEIGHT: 60 IN | HEART RATE: 84 BPM | BODY MASS INDEX: 26.62 KG/M2 | WEIGHT: 135.6 LBS | SYSTOLIC BLOOD PRESSURE: 131 MMHG

## 2022-06-02 DIAGNOSIS — C50.411 MALIGNANT NEOPLASM OF UPPER-OUTER QUADRANT OF RIGHT FEMALE BREAST: ICD-10-CM

## 2022-06-02 DIAGNOSIS — Z45.2 ENCOUNTER FOR ADJUSTMENT AND MANAGEMENT OF VASCULAR ACCESS DEVICE: ICD-10-CM

## 2022-06-02 DIAGNOSIS — C50.911 MALIGNANT NEOPLASM OF UNSPECIFIED SITE OF RIGHT FEMALE BREAST: ICD-10-CM

## 2022-06-02 PROCEDURE — 99024 POSTOP FOLLOW-UP VISIT: CPT

## 2022-06-02 NOTE — HISTORY OF PRESENT ILLNESS
[FreeTextEntry1] : JOSE FRANCISCO THOMAS is a 72 y/o post menopausal F who presents with a self palpated RIGHT breast cancer, [id number], ER/DC pos, Her 2 neg, stage IIIB AJCC 8th edition. \par \par 2021 -- b/l SSM, R ALND, L SLN Bx with immediate reconstruction \par 1. 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 LEFT breast \par -atypical lobular hyperplasia \par \par Of note, her daughter, Sierra was present for the entirety of this consultation. \par \par She first palpated a mass in the R UOQ, for the past year.  She has noticed worsening pain in that area and does think that it has increased in size since she first felt it.  She denies any left sided breast complaints and denies any nipple discharge or retraction. \par \par Her work up was as follows:\par 2021 - B/L Dx Mammo & Sono:\par -breasts are heterogeneously dense\par -R: UOQ, spiculated mass, with calcifications, c/w US finding below \par -R: Medial to this mass in the posterior depth, an area of architectural distortion. \par -R: Slightly superior to this mass, there is a an additional circumscribed mass also seen on concurrent ultrasound.\par Right breast:\par - In the axilla, at the 10 to 11:00 position 12 cm from the nipple, there is a cortically thickened axillary lymph node measuring 2.2 cm --> BIOPSY \par -Corresponding to the area palpable concern at the 10 to 11:00 position 5 to 8 cm from the nipple, there is a spiculated mass with associated coarse calcifications measuring 2.7 x 1.2 x 1.8 cm --> BIOPSY \par -At the 11:00 position 10 cm from the nipple, there is a circumscribed hypoechoic mass measuring 1.3 x 1.2 x 0.4 cm, corresponding to mammographic findings --> BIOPSY \par -At the 6:00 position 5 cm from the nipple, there is a circumscribed hypoechoic mass measuring 0.3 x 0.3 x 0.2 cm.\par -At the 6:00 position 4 cm from the nipple, there is a circumscribed hypoechoic mass measuring 0.6 x 0.6 x 0.2 cm --> BIOPSY \par Left breast:\par No suspicious solid or cystic masses. No axillary adenopathy.\par BI-RADS Category 5: Highly Suggestive of Malignancy\par \par \par 2021 - US Guided Core Bx:\par 1. Right, 10-11:00 N5-8, 2.7cm: (top-hat)\par - Invasive moderately differentiated ductal carcinoma with dominant micropapillary features.\par - Ductal carcinoma in-situ (DCIS), cribriform type with comedo necrosis and microcalcifications, intermediate nuclear grade.\par - Foci of lymphovascular invasion are present.\par -ER  (+)  99%\par -DC  (+)  15%\par -HER2  (-) on IHC\par -Ki-67  20%\par \par 2. Right, 11:00 N10, 1.3cm: (mini-cork)\par - Benign atrophic fatty breast tissue with a dominant macrocyst\par wall fragment demonstrating an attenuated epithelial lining;\par consistent with a dilated duct.\par Findings are benign concordant.\par \par 3. Right, 6:00 N4, 0.6cm: (stoplight clip) \par - Hyalinized fibroadenoma.\par Findings are benign concordant.\par \par 4. Right, axillary mass 10-12:00 N12, 2.2cm: (twirl)\par - Lymph node fragments containing metastatic carcinoma (),\par the largest contiguous focus of which measures 6.0 mm\par (microscopic measurement).\par - No extracapsular extension is identified in this biopsy material.\par Findings are malignant concordant.\par \par HISTORICAL RISK FACTORS: \par -no prior breast biopsies, hx of lumpectomy for reportedly benign disease  \par -family history of breast cancer in a paternal aunt at age 45 \par -, age at first live birth was 24 \par -no prior OCP use \par -s/p DAVIE, unilateral oophorectomy in  \par \par Bx specimen sent for Oncotype Dx = 27.\par \par INTERVAL HISTORY:\par 2021 --\par Jose Francisco returns for a surgical re discussion.\par She met with Dr. Keyla Tejada of medical oncology and discussed option of neoadjuvant chemotherapy.\par As per Dr. Tejada, after informed discussion, Jose Francisco is thinking to have mastectomy. She wants to proceed with surgery first and have chemotherapy after surgery. She understands that she will need to have right axillary lymph node dissection which involves more tissue removed and increased risk of lymphedema. \par \par She has also met with Dr. Priscilla Barrios of plastic surgery for consultation. She has elected to proceed with bilateral mastectomy.  Dr. Barrios feels she is a good candidate for prosthetic breast reconstruction. After a long discussion she has decided to proceed with direct-to-silicone implant; possible tissue expander with mesh, followed by silicone implant at a later date. \par \par SInce her last visit, she also underwent a PET/CT on 2021 and breast MRI on 2021 which did not reveal any additional areas of disease aside from her her right breast mass and metastatic right axillary lymph node. \par \par INTERVAL HISTORY: 10/12/21\par Jose Francisco is 71 year old female here for her FIRST POST OP apt s/p  LEFT SSM with sentinel LN bx and RIGHT SSM with axillary dissection on 21 \par \par She is feeling well\par She denies any fever/chills or erythema and / or drainage related to incision area. \par Her pain is well controlled, only complains of mild tenderness of the area.\par \par \par INTERVAL HISTORY 5/3/22\par Jose Francisco is here for her SIX MONTHS FOLLOW UP VISIT \par She finished dose-dense AC x 4. She is currently on weekly Taxol.\par \par INTERVAL HISTORY 22\par Jose Francisco is here for her FIRST POST OP VISIT s/p mediport removal on 22\par \par \par She is feeling well\par She denies any fever/chills or erythema and / or drainage related to incision area. \par Her pain is well controlled, only complains of mild tenderness of the area.\par Her pathology was discussed. \par \par She has not started Letrozole but has meds available at home.\par \par Her breast MRI showed No evidence of malignancy and was deemed BIRADS2\par

## 2022-06-02 NOTE — PHYSICAL EXAM
[Normocephalic] : normocephalic [Atraumatic] : atraumatic [EOMI] : extra ocular movement intact [Examined in the supine and seated position] : examined in the supine and seated position [Symmetrical] : symmetrical [No Axillary Lymphadenopathy] : no left axillary lymphadenopathy [No Edema] : no edema [No Rashes] : no rashes [No Ulceration] : no ulceration

## 2022-06-08 ENCOUNTER — APPOINTMENT (OUTPATIENT)
Dept: PLASTIC SURGERY | Facility: CLINIC | Age: 72
End: 2022-06-08

## 2022-06-16 PROCEDURE — 77290 THER RAD SIMULAJ FIELD CPLX: CPT | Mod: 26

## 2022-06-16 PROCEDURE — 77334 RADIATION TREATMENT AID(S): CPT | Mod: 26

## 2022-06-23 PROCEDURE — 77295 3-D RADIOTHERAPY PLAN: CPT | Mod: 26

## 2022-06-23 PROCEDURE — 77300 RADIATION THERAPY DOSE PLAN: CPT | Mod: 26

## 2022-06-23 PROCEDURE — 77334 RADIATION TREATMENT AID(S): CPT | Mod: 26

## 2022-06-29 PROCEDURE — 77334 RADIATION TREATMENT AID(S): CPT | Mod: 26

## 2022-06-29 PROCEDURE — 77280 THER RAD SIMULAJ FIELD SMPL: CPT | Mod: 26

## 2022-06-30 PROCEDURE — G6017: CPT

## 2022-07-01 PROCEDURE — G6017: CPT

## 2022-07-05 ENCOUNTER — NON-APPOINTMENT (OUTPATIENT)
Age: 72
End: 2022-07-05

## 2022-07-05 VITALS
TEMPERATURE: 97 F | SYSTOLIC BLOOD PRESSURE: 145 MMHG | WEIGHT: 137.4 LBS | DIASTOLIC BLOOD PRESSURE: 70 MMHG | RESPIRATION RATE: 14 BRPM | BODY MASS INDEX: 26.83 KG/M2 | HEART RATE: 65 BPM | OXYGEN SATURATION: 98 %

## 2022-07-05 PROCEDURE — G6017: CPT

## 2022-07-05 NOTE — DISEASE MANAGEMENT
[Pathological] : TNM Stage: p [IB] : IB [FreeTextEntry4] : Invasive moderately differentiated ductal carcinoma of the right breast, ER/[place] positive, HER 2 negative [TTNM] : 2 [NTNM] : 2 [MTNM] : 0 [de-identified] : 386 [de-identified] : [id number]

## 2022-07-05 NOTE — HISTORY OF PRESENT ILLNESS
[FreeTextEntry1] : Patient reports of "doing a lot" this holiday weekend and is feeling more tired than usual, has increased joint pain to the left shoulder, mild relief from Aleve.  She is taking Letrozole and feels this is increasing her joint pain.  Otherwise, she has no new concerns.  She continues to feel unhappy with the appearance of her reconstructed breasts.

## 2022-07-05 NOTE — PHYSICAL EXAM
[Sclera] : the sclera and conjunctiva were normal [Hearing Threshold Finger Rub Not [place]] : hearing was normal [] : no respiratory distress [Respiration, Rhythm And Depth] : normal respiratory rhythm and effort [Exaggerated Use Of Accessory Muscles For Inspiration] : no accessory muscle use [Heart Rate And Rhythm] : heart rate and rhythm were normal [Breast Reconstruction Right] : breast reconstruction [Breast Reconstruction Left] : breast reconstruction [Range of Motion to Joints] : range of motion to joints [Nail Clubbing] : no clubbing  or cyanosis of the fingernails [No Focal Deficits] : no focal deficits [Motor Exam] : the motor exam was normal [Normal] : oriented to person, place and time, the affect was normal, the mood was normal and not anxious [Enlargement Of The Right Breast] : no swelling [___] :  no erythema

## 2022-07-06 ENCOUNTER — NON-APPOINTMENT (OUTPATIENT)
Age: 72
End: 2022-07-06

## 2022-07-06 PROCEDURE — G6017: CPT

## 2022-07-07 ENCOUNTER — NON-APPOINTMENT (OUTPATIENT)
Age: 72
End: 2022-07-07

## 2022-07-08 PROCEDURE — 77427 RADIATION TX MANAGEMENT X5: CPT

## 2022-07-11 ENCOUNTER — NON-APPOINTMENT (OUTPATIENT)
Age: 72
End: 2022-07-11

## 2022-07-11 VITALS
SYSTOLIC BLOOD PRESSURE: 122 MMHG | OXYGEN SATURATION: 96 % | RESPIRATION RATE: 12 BRPM | WEIGHT: 136 LBS | HEART RATE: 74 BPM | BODY MASS INDEX: 26.56 KG/M2 | TEMPERATURE: 98.2 F | DIASTOLIC BLOOD PRESSURE: 75 MMHG

## 2022-07-11 PROCEDURE — G6017: CPT

## 2022-07-11 NOTE — REVIEW OF SYSTEMS
[Fatigue: Grade 1 - Fatigue relieved by rest] : Fatigue: Grade 1 - Fatigue relieved by rest [Breast Pain: Grade 1 - Mild pain] : Breast Pain: Grade 1 - Mild pain [Pruritus: Grade 0] : Pruritus: Grade 0 [Dermatitis Radiation: Grade 1 - Faint erythema or dry desquamation] : Dermatitis Radiation: Grade 1 - Faint erythema or dry desquamation

## 2022-07-12 PROCEDURE — G6017: CPT

## 2022-07-12 NOTE — VITALS
[80: Normal activity with effort; some signs or symptoms of disease.] : 80: Normal activity with effort; some signs or symptoms of disease.  [Maximal Pain Intensity: 3/10] : 3/10

## 2022-07-12 NOTE — PHYSICAL EXAM
[Sclera] : the sclera and conjunctiva were normal [Hearing Threshold Finger Rub Not [place]] : hearing was normal [] : no respiratory distress [Respiration, Rhythm And Depth] : normal respiratory rhythm and effort [Exaggerated Use Of Accessory Muscles For Inspiration] : no accessory muscle use [Heart Rate And Rhythm] : heart rate and rhythm were normal [___] : a [unfilled] ~Ucm mastectomy scar [Breast Reconstruction Right] : breast reconstruction [Nail Clubbing] : no clubbing  or cyanosis of the fingernails [Motor Tone] : muscle strength and tone were normal [Skin Color & Pigmentation] : normal skin color and pigmentation [No Focal Deficits] : no focal deficits [Motor Exam] : the motor exam was normal [Normal] : oriented to person, place and time, the affect was normal, the mood was normal and not anxious [Enlargement Of The Right Breast] : no swelling [Tenderness Of The Right Breast] : no tenderness

## 2022-07-12 NOTE — HISTORY OF PRESENT ILLNESS
[FreeTextEntry1] : Patient reports of b/l breast discomfort since reconstructive procedure.  She feels skin is tighter at the treated site.  She missed XRT one day last week ( 7/7/2022)  2/2 "pain", took Ibuprofen with mild relief.   She is moisturizing daily.  She will miss this Fridays XRT, stating, they are celebrating her father's 89th birthday on Saturday.  Otherwise, she continues with physical therapy for ROM to b/l UE.

## 2022-07-12 NOTE — DISEASE MANAGEMENT
[Pathological] : TNM Stage: p [IB] : IB [FreeTextEntry4] : Invasive moderately differentiated ductal carcinoma of the right breast, ER/[place] positive, HER 2 negative [TTNM] : 2 [NTNM] : 2 [MTNM] : 0 [de-identified] : 1200cGy [de-identified] : 5000cGy [de-identified] : right chest wall

## 2022-07-13 PROCEDURE — G6017: CPT

## 2022-07-14 PROCEDURE — G6017: CPT

## 2022-07-18 ENCOUNTER — NON-APPOINTMENT (OUTPATIENT)
Age: 72
End: 2022-07-18

## 2022-07-18 VITALS
SYSTOLIC BLOOD PRESSURE: 157 MMHG | OXYGEN SATURATION: 99 % | DIASTOLIC BLOOD PRESSURE: 74 MMHG | RESPIRATION RATE: 16 BRPM | HEART RATE: 67 BPM | TEMPERATURE: 97.3 F | BODY MASS INDEX: 26.56 KG/M2 | WEIGHT: 136 LBS

## 2022-07-18 PROCEDURE — 77427 RADIATION TX MANAGEMENT X5: CPT

## 2022-07-18 RX ORDER — PROPRANOLOL HYDROCHLORIDE 80 MG/1
TABLET ORAL
Refills: 0 | Status: DISCONTINUED | COMMUNITY
End: 2022-07-18

## 2022-07-18 RX ORDER — CANDESARTAN CILEXETIL 16 MG/1
16 TABLET ORAL
Qty: 90 | Refills: 0 | Status: DISCONTINUED | COMMUNITY
Start: 2022-05-05 | End: 2022-07-18

## 2022-07-18 NOTE — DISEASE MANAGEMENT
[Pathological] : TNM Stage: p [IB] : IB [FreeTextEntry4] : Invasive moderately differentiated ductal carcinoma of the right breast, ER/AL positive, HER 2 negative [TTNM] : 2 [NTNM] : 2 [MTNM] : 0 [de-identified] : 2000cGy [de-identified] : 5000cGy [de-identified] : right chest wall

## 2022-07-18 NOTE — HISTORY OF PRESENT ILLNESS
[FreeTextEntry1] : Patient reports doing well. She continues to moisturize after treatment with Eucerin. She has moderate b/l breast discomfort from implants. She reports occasional pruritus, she apply cortisone PRN.  \par

## 2022-07-18 NOTE — PHYSICAL EXAM
[Sclera] : the sclera and conjunctiva were normal [] : no respiratory distress [Exaggerated Use Of Accessory Muscles For Inspiration] : no accessory muscle use [Normal] : oriented to person, place and time, the affect was normal, the mood was normal and not anxious [de-identified] : Moderate erythema, no desquamation

## 2022-07-18 NOTE — REVIEW OF SYSTEMS
[Dermatitis Radiation: Grade 1 - Faint erythema or dry desquamation] : Dermatitis Radiation: Grade 1 - Faint erythema or dry desquamation [Fatigue: Grade 0] : Fatigue: Grade 0 [Breast Pain: Grade 2 - Moderate pain; limiting instrumental ADL] : Breast Pain: Grade 2 - Moderate pain; limiting instrumental ADL [Pruritus: Grade 1 - Mild or localized; topical intervention indicated] : Pruritus: Grade 1 - Mild or localized; topical intervention indicated

## 2022-07-19 PROCEDURE — G6017: CPT

## 2022-07-21 ENCOUNTER — APPOINTMENT (OUTPATIENT)
Dept: HEMATOLOGY ONCOLOGY | Facility: CLINIC | Age: 72
End: 2022-07-21

## 2022-07-21 VITALS
BODY MASS INDEX: 26.5 KG/M2 | HEART RATE: 68 BPM | TEMPERATURE: 98.1 F | RESPIRATION RATE: 18 BRPM | WEIGHT: 135 LBS | SYSTOLIC BLOOD PRESSURE: 136 MMHG | HEIGHT: 60 IN | DIASTOLIC BLOOD PRESSURE: 89 MMHG

## 2022-07-21 PROCEDURE — 99214 OFFICE O/P EST MOD 30 MIN: CPT

## 2022-07-21 PROCEDURE — G6017: CPT

## 2022-07-22 NOTE — ASSESSMENT
[FreeTextEntry1] : 70 yo female has stage IIIA ([id number]) right breast multifocal invasive poorly differentiated micropapillary carcinoma, 3.7 cm and invasive well differentiated tubulo-lobular variant of ductal carcinoma, 0.8 cm, ER/IN positive, Her-2 negative, s/p right breast modified radical skin-sparing mastectomy, right ALND, left breast simple skin-sparing mastectomy and left axillary SLNB with immediate direct to implant reconstruction. 8/12 right axillary lymph nodes are positive for metastatic carcinoma with extensive extracapsular extension.\par Oncotype RS is 27. \par Staging PET/CT on 8/16/21 was negative for distant mets. \par \par The left breast has atypical lobular hyperplasia. \par \par Assessment and Plan:\par -- S/P AC x 4 and Taxol x 14 completed on 4/14/22\par -- TTE on 9/30/21 showed normal LV Ejection Fraction of 66 %. Echo also reported moderately enlarged right ventricle, mild-moderate tricuspid regurgitation, mild to moderate pulmonic valve regurgitation. Discussed with Dr. Robert, cardiology. He compared patient's current and previous Echo. There is only mild enlarged right ventricle. She saw Dr. Robert and had repeat 2D Echo. \par -- MRI of the breast on 5/16/22 showed no evidence of malignancy.\par -- Following with Dr Bagley for adjuvant PMRT. \par -- Discussed with patient to continue to hold Letrozole. She will complete RT in 3 weeks and we will discuss restarting endocrine therapy with a different AI. In light of 8 positive lymph nodes and KI 67 20-25%, she will be benefit from addition of CDK 4/6 inhibitor Abemaciclib along with adjuvant endocrine therapy. Will discuss with her in more detail after she completes RT. The copy of publication on [facility] trial was provided to her. \par -- Reviewed Dexa with patient, showed osteopenia T score -1.8. Continue calcium and vitamin D supplement. \par -- Follow up with PMD and cardiology regarding HTN meds. \par -- RTO for f/u in 5 weeks.\par \par Case was seen and discussed with Dr. Tejada who agreed with assessment and plan.\par  \par \par \par \par

## 2022-07-22 NOTE — PHYSICAL EXAM
[Restricted in physically strenuous activity but ambulatory and able to carry out work of a light or sedentary nature] : Status 1- Restricted in physically strenuous activity but ambulatory and able to carry out work of a light or sedentary nature, e.g., light house work, office work [Normal] : affect appropriate [de-identified] : S/P b/l mastectomy and implant reconstruction. Surgical incisions are healing well. There is no palpable abnormality.  [de-identified] : port-a-cath placed to left chest wall, intact, no erythema

## 2022-07-22 NOTE — HISTORY OF PRESENT ILLNESS
[de-identified] : JOSE FRANCISCO THOMAS is a 71 year old female PMHx of cervical cancer s/p hysterectomy and unilateral salpingo-oophorectomy,  HTN presents for newly diagnosed invasive breast cancer. \par \par Patient first palpated a mass in the RUOQ herself within the past year. She had pain at the site and feels that it is growing. She went for b/l diagnostic mammo and sonogram on 6/17/2021. Her left breast had no suspicious findings however on her right breast a spiculated mass in the RUOQ as well as a second mass superior to with a thickened axillary 2.2 cm axillary node was seen. \par -breasts are heterogeneously dense\par -R: UOQ, spiculated mass, with calcifications, c/w US finding below \par -R: Medial to this mass in the posterior depth, an area of architectural distortion. \par -R: Slightly superior to this mass, there is a an additional circumscribed mass also seen on concurrent ultrasound.\par Right breast:\par - In the axilla, at the 10 to 11:00 position 12 cm from the nipple, there is a cortically thickened axillary lymph node measuring 2.2 cm --> BIOPSY \par -Corresponding to the area palpable concern at the 10 to 11:00 position 5 to 8 cm from the nipple, there is a spiculated mass with associated coarse calcifications measuring 2.7 x 1.2 x 1.8 cm --> BIOPSY \par -At the 11:00 position 10 cm from the nipple, there is a circumscribed hypoechoic mass measuring 1.3 x 1.2 x 0.4 cm, corresponding to mammographic findings --> BIOPSY \par -At the 6:00 position 5 cm from the nipple, there is a circumscribed hypoechoic mass measuring 0.3 x 0.3 x 0.2 cm.\par -At the 6:00 position 4 cm from the nipple, there is a circumscribed hypoechoic mass measuring 0.6 x 0.6 x 0.2 cm --> BIOPSY \par BI-RADS Category 5: Highly Suggestive of Malignancy\par \par On 7/21/2021, she went for US guided biopsy of  the 2cm mass which showed invasive moderately differential ductal carcinoma, with dominant micrpapillary features\par 1. Breast, right 10-11 N5-8 mass, ultrasound guided needle core\par biopsies:\par - Invasive moderately differentiated ductal carcinoma with dominant micropapillary features, ER 99%, CT 15%. Ki67 20%, Her 2 negative (IHC 1+, Her2/CEP17 1.9)\par - Ductal carcinoma in-situ (DCIS), cribriform type with comedo\par necrosis and microcalcifications, intermediate nuclear grade.\par - Foci of lymphovascular invasion are present.\par \par 2. Breast, right 11 N10 mass, ultrasound guided needle core biopsies:\par - Benign atrophic fatty breast tissue with a dominant macrocyst wall fragment demonstrating an attenuated epithelial lining; consistent with a dilated duct.\par \par 3. Breast, right 6 N4 mass, ultrasound guided needle core biopsies:\par - Hyalinized fibroadenoma.\par \par 4. Breast, right axillary mass, ultrasound guided needle core biopsies:\par - Lymph node fragments containing metastatic carcinoma (1/1), the largest contiguous focus of which measures 6.0 mm (microscopic measurement).\par - No extracapsular extension is identified in this biopsy material.\par \par On 8/16/21, she had a PET/CT which showed pathologic FDG uptake (SUV 7) coregistering with 3.6 x 2 cm right breast mass and pathologic FDG uptake in 2 cm right axillary nodule (SUV 6.1) consistent with documented biologic tumor activity. No other sites of abnormal FDG uptake\par \par On 8/18/21, she had b/l breast MRI MRI which showed the biopsy proven right breast carcinoma 1.9 x 3.7 x 3.5 cm and 2 cm right axillary adenopathy. No additional suspicious enhancement in either breast. \par \par She saw Dr. Ferguson for surgical consultation. She was given options to have neoadjuvant chemo followed by surgery or immediate surgery. \par \par Her family history is significant for breast cancer in her paternal aunt in her 40s,  2 uncles who had "brain cancer"; and her mom who had cervical cancer. \par  [de-identified] : 10/27/21:\dharmesh Galicia is here today for followup visit. She was previously seen on 8/20/21. She was diagnosed with  IDC of the right breast with 3 cm mass and positive right axillary lymph node, ER/ME positive, Her-2 negative. Clinical stage is T3N1 with Oncotype RS 27. She was given an option to have neoadjuvant chemotherapy prior to surgery but she opted to have surgery first. On 9/29/21, she underwent right breast modified radical skin-sparing mastectomy, right ALND, left breast simple skin-sparing mastectomy and left axillary SLNB with immediate direct to implant reconstruction. \par - The right mastectomy revealed 3.7 cm invasive poorly differentiated micropapillary carcinoma, ER 85%, ME 15%, Her-2 negative (FISH ration 1.8), Ki 67 20-25% and 0.8 cm invasive well differentiated tubulo-lobular variant of ductal carcinoma, %, ME 90%, Her-2 negative and Ki 67 3-5%,  Non-extensive ductal carcinoma in-situ (DCIS), cribriform and micropapillary types with comedo necrosis and microcalcifications, intermediate to focally high nuclear grade. Numerous foci of lymphovascular invasion are present. The invasive tumor extends to focally involve (touches both ink and cautery) the blue inked surface. The nipple, skin, and deep fascial margin are negative for carcinoma. Right ALND reveal metastatic carcinoma present in eight out of twelve axillary lymph nodes (8/12) with extensive extracapsular extension seen. The largest node contains a metallic "ring-shaped" biopsy clip and shows focal prior biopsy site changes.\par AJCC 8th Edition Pathologic Stage, m(2)pT2, pN2a, pMx.\par - The left breast simple skin-sparing mastectomy show atrophic predominantly fatty breast tissue with focal atypical lobular hyperplasia (ALH). 2 sentinel lymph noses are negative for malignancy.  \par She recovered well from surgery and is here today to discuss adjuvant systemic therapy. \par \par 12/2/21\dharmesh Galicia is here today for followup visit and chemotherapy. She has stage IIIA ([id number]) right breast multifocal invasive poorly differentiated micropapillary carcinoma, 3.7 cm and invasive well differentiated tubulo-lobular variant of ductal carcinoma, 0.8 cm, ER/ME positive, Her-2 negative, s/p right breast modified radical skin-sparing mastectomy, right ALND, left breast simple skin-sparing mastectomy and left axillary SLNB with immediate implant reconstruction. 8/12 right axillary lymph nodes are positive for metastatic carcinoma with extensive extracapsular extension. Oncotype RS is 27. She is indicated for adjuvant chemotherapy.  She had port a cath placed on 11/18 to left chest wall and felt pain at port site with movement of left upper extremity. She followed up with IR regarding concerns, no evidence of port infection, or blockage/clot within port catheter. She also had HOMA done on 9/30 LV EF normal. \par \par 12/16/21\dharmesh Galicia is here today for followup visit and chemotherapy. She has stage IIIA ([id number]) right breast multifocal invasive poorly differentiated micropapillary carcinoma, 3.7 cm and invasive well differentiated tubulo-lobular variant of ductal carcinoma, 0.8 cm, ER/ME positive, Her-2 negative, s/p right breast modified radical skin-sparing mastectomy, right ALND, left breast simple skin-sparing mastectomy and left axillary SLNB with immediate implant reconstruction. 8/12 right axillary lymph nodes are positive for metastatic carcinoma with extensive extracapsular extension. Oncotype RS is 27. She started adjuvant chemotherapy with dose-dense AC. To date she received 1 cycle of AC, tolerating well. She denies nausea, vomiting, diarrhea or breast pain. She c/o that her constipation is worse from her baseline, taking Senna twice daily.\par She also had HOMA done on 9/30/21 LV EF normal. \par \par 12/30/21\dharmesh Galicia is here today for followup visit and chemotherapy. She has stage IIIA ([id number]) right breast multifocal invasive poorly differentiated micropapillary carcinoma, 3.7 cm and invasive well differentiated tubulo-lobular variant of ductal carcinoma, 0.8 cm, ER/ME positive, Her-2 negative, s/p right breast modified radical skin-sparing mastectomy, right ALND, left breast simple skin-sparing mastectomy and left axillary SLNB with immediate implant reconstruction. 8/12 right axillary lymph nodes are positive for metastatic carcinoma with extensive extracapsular extension. She started adjuvant chemotherapy with dose-dense AC. To date she received 2 cycles of AC, tolerating well. She denies nausea, vomiting, diarrhea or breast pain. Her constipation has improved with senna 3 tablets and colace. She also had HOMA done on 9/30/21 LV EF normal. \par \par 1/13/22:jossie Galicia is here today for followup visit and chemotherapy. She has stage IIIA ([id number]) right breast multifocal invasive poorly differentiated micropapillary carcinoma, 3.7 cm and invasive well differentiated tubulo-lobular variant of ductal carcinoma, 0.8 cm, ER/ME positive, Her-2 negative, s/p right breast modified radical skin-sparing mastectomy, right ALND, left breast simple skin-sparing mastectomy and left axillary SLNB with immediate implant reconstruction. 8/12 right axillary lymph nodes are positive for metastatic carcinoma with extensive extracapsular extension. She has been on adjuvant chemotherapy with dose-dense AC. To date she received 3 cycles of AC, tolerating well. She saw Dr. Robert and had repeat 2D Echo. \par \par 1/27/22:jossie Galicia is here today for followup visit and chemotherapy. She has stage IIIA ([id number]) right breast multifocal invasive poorly differentiated micropapillary carcinoma, 3.7 cm and invasive well differentiated tubulo-lobular variant of ductal carcinoma, 0.8 cm, ER/ME positive, Her-2 negative, s/p right breast modified radical skin-sparing mastectomy, right ALND, left breast simple skin-sparing mastectomy and left axillary SLNB with immediate implant reconstruction. 8/12 right axillary lymph nodes are positive for metastatic carcinoma with extensive extracapsular extension. She has been on adjuvant chemotherapy. She finished dose-dense AC x 4. She reported white coating on oral mucosa. She was given Nystatin swishing and spiting. She is feeling better now. She saw Dr. Robert and had repeat 2D Echo. \par \par 2/24/22jossie Galicia is here today for followup visit and chemotherapy. She has stage IIIA ([id number]) right breast multifocal invasive poorly differentiated micropapillary carcinoma, 3.7 cm and invasive well differentiated tubulo-lobular variant of ductal carcinoma, 0.8 cm, ER/ME positive, Her-2 negative, s/p right breast modified radical skin-sparing mastectomy, right ALND, left breast simple skin-sparing mastectomy and left axillary SLNB with immediate implant reconstruction. 8/12 right axillary lymph nodes are positive for metastatic carcinoma with extensive extracapsular extension. She has been on adjuvant chemotherapy. She finished dose-dense AC x 4. She is currently on weekly Taxol. Today, she is scheduled for 5/12 Taxol.  She c/o bloatedness, hearing loss, anxiety, nervousness, feeling agitated.  She is not happy about gaining 5 lbs in 1 month.  Denies fever, N/V/C/D, paresthesias.\par \par 3/24/22:jossie Floresmary is here today for followup visit and chemotherapy. She has stage IIIA ([id number]) right breast multifocal invasive poorly differentiated micropapillary carcinoma, 3.7 cm and invasive well differentiated tubulo-lobular variant of ductal carcinoma, 0.8 cm, ER/ME positive, Her-2 negative, s/p right breast modified radical skin-sparing mastectomy, right ALND, left breast simple skin-sparing mastectomy and left axillary SLNB with immediate implant reconstruction. 8/12 right axillary lymph nodes are positive for metastatic carcinoma with extensive extracapsular extension. She has been on adjuvant chemotherapy. She finished dose-dense AC x 4. She is currently on weekly Taxol. Today, she is scheduled for 9/12 Taxol.  She reports chemotherapy-related nail changes. \par \par 4/14/22:jossie Galicia is here today for followup visit and chemotherapy. She has stage IIIA ([id number]) right breast multifocal invasive poorly differentiated micropapillary carcinoma, 3.7 cm and invasive well differentiated tubulo-lobular variant of ductal carcinoma, 0.8 cm, ER/ME positive, Her-2 negative, s/p right breast modified radical skin-sparing mastectomy, right ALND, left breast simple skin-sparing mastectomy and left axillary SLNB with immediate implant reconstruction. 8/12 right axillary lymph nodes are positive for metastatic carcinoma with extensive extracapsular extension. She has been on adjuvant chemotherapy. She finished dose-dense AC x 4. She is currently on weekly Taxol. Today, she is scheduled for last treatment (week 12 Taxol).  \par \par 5/12/2022jossie Galicia is here today for followup visit. She has stage IIIA ([id number]) right breast multifocal invasive poorly differentiated micropapillary carcinoma, 3.7 cm and invasive well differentiated tubulo-lobular variant of ductal carcinoma, 0.8 cm, ER/ME positive, Her-2 negative, s/p right breast modified radical skin-sparing mastectomy, right ALND, left breast simple skin-sparing mastectomy and left axillary SLNB with immediate implant reconstruction. 8/12 right axillary lymph nodes are positive for metastatic carcinoma with extensive extracapsular extension. She completed adjuvant chemotherapy with dose-dense AC x 4 followed by weekly Taxol x 12. She followed up with Dr Ferguson and is planned for port removal on 5/23/22 and MRI breast on 5/16/22. She has not started Letrozole but has meds available at home. She is due for baseline DEXA scan.\par \par 7/22/22\dharmesh Galicia is here today for followup visit. She has stage IIIA ([id number]) right breast multifocal invasive poorly differentiated micropapillary carcinoma, 3.7 cm and invasive well differentiated tubulo-lobular variant of ductal carcinoma, 0.8 cm, ER/ME positive, Her-2 negative, s/p right breast modified radical skin-sparing mastectomy, right ALND, left breast simple skin-sparing mastectomy and left axillary SLNB with immediate implant reconstruction. 8/12 right axillary lymph nodes are positive for metastatic carcinoma with extensive extracapsular extension. She completed adjuvant chemotherapy with dose-dense AC x 4 followed by weekly Taxol x 12. She had port removed on with Dr Ferguson.  She started adjuvant PMRT on 6/14. She started letrozole in May, experienced increasing arthralgia and hot flashes. She stopped taking letrozole 2 weeks ago. She also stopped metoprolol and candesartan, for low BP at home, according to patient it was 100/60s as per patient. She has not followed up with PMD or cardiologist. She had DEXA on 5/16/22 which showed osteopenia T score -1.2. She had bilateral breast MRI on 5/16/21 which showed no evidence of malignancy.

## 2022-07-25 ENCOUNTER — NON-APPOINTMENT (OUTPATIENT)
Age: 72
End: 2022-07-25

## 2022-07-26 VITALS
RESPIRATION RATE: 16 BRPM | BODY MASS INDEX: 26.95 KG/M2 | OXYGEN SATURATION: 98 % | HEART RATE: 69 BPM | WEIGHT: 138 LBS | SYSTOLIC BLOOD PRESSURE: 147 MMHG | TEMPERATURE: 97.7 F | DIASTOLIC BLOOD PRESSURE: 69 MMHG

## 2022-07-26 PROCEDURE — G6017: CPT

## 2022-07-26 RX ORDER — METOPROLOL TARTRATE 25 MG/1
25 TABLET, FILM COATED ORAL
Refills: 0 | Status: DISCONTINUED | COMMUNITY
End: 2022-07-26

## 2022-07-26 RX ORDER — LETROZOLE TABLETS 2.5 MG/1
2.5 TABLET, FILM COATED ORAL DAILY
Qty: 90 | Refills: 2 | Status: DISCONTINUED | COMMUNITY
Start: 2022-03-24 | End: 2022-07-26

## 2022-07-26 NOTE — HISTORY OF PRESENT ILLNESS
[FreeTextEntry1] : Patient reports doing well. She continues to moisturize with Eucerin and cortisone for itch. She is taking Tylenol PRN for b/l breast discomfort from implants.

## 2022-07-26 NOTE — PHYSICAL EXAM
[Sclera] : the sclera and conjunctiva were normal [] : no respiratory distress [Exaggerated Use Of Accessory Muscles For Inspiration] : no accessory muscle use [Normal] : oriented to person, place and time, the affect was normal, the mood was normal and not anxious [de-identified] : Mild erythema.  No desquamation

## 2022-07-26 NOTE — VITALS
[Maximal Pain Intensity: 4/10] : 4/10 [Least Pain Intensity: 2/10] : 2/10 [80: Normal activity with effort; some signs or symptoms of disease.] : 80: Normal activity with effort; some signs or symptoms of disease.

## 2022-07-26 NOTE — REVIEW OF SYSTEMS
[Fatigue: Grade 0] : Fatigue: Grade 0 [Breast Pain: Grade 1 - Mild pain] : Breast Pain: Grade 1 - Mild pain [Pruritus: Grade 1 - Mild or localized; topical intervention indicated] : Pruritus: Grade 1 - Mild or localized; topical intervention indicated [Dermatitis Radiation: Grade 1 - Faint erythema or dry desquamation] : Dermatitis Radiation: Grade 1 - Faint erythema or dry desquamation

## 2022-07-26 NOTE — DISEASE MANAGEMENT
[Pathological] : TNM Stage: p [IB] : IB [FreeTextEntry4] : Invasive moderately differentiated ductal carcinoma of the right breast, ER/CA positive, HER 2 negative [TTNM] : 2 [NTNM] : 2 [MTNM] : 0 [de-identified] : 2607cGy [de-identified] : 5000cGy [de-identified] : right chest wall

## 2022-07-27 PROCEDURE — G6017: CPT

## 2022-07-29 PROCEDURE — 77427 RADIATION TX MANAGEMENT X5: CPT

## 2022-08-01 ENCOUNTER — NON-APPOINTMENT (OUTPATIENT)
Age: 72
End: 2022-08-01

## 2022-08-01 VITALS
BODY MASS INDEX: 27.2 KG/M2 | OXYGEN SATURATION: 99 % | DIASTOLIC BLOOD PRESSURE: 74 MMHG | HEART RATE: 63 BPM | TEMPERATURE: 97.2 F | RESPIRATION RATE: 16 BRPM | WEIGHT: 139.25 LBS | SYSTOLIC BLOOD PRESSURE: 165 MMHG

## 2022-08-01 PROCEDURE — G6017: CPT

## 2022-08-01 NOTE — VITALS
[Maximal Pain Intensity: 9/10] : 9/10 [Least Pain Intensity: 4/10] : 4/10 [Pain Location: ___] : Pain Location: [unfilled] [OTC] : OTC [80: Normal activity with effort; some signs or symptoms of disease.] : 80: Normal activity with effort; some signs or symptoms of disease.

## 2022-08-01 NOTE — HISTORY OF PRESENT ILLNESS
[FreeTextEntry1] : Patient complains of right breast and axilla burning and pain. She takes Tylenol PRN with no relief. Applying Eucerin after treatment and cortisone for pruritus. \par

## 2022-08-01 NOTE — DISEASE MANAGEMENT
[Pathological] : TNM Stage: p [IB] : IB [FreeTextEntry4] : Invasive moderately differentiated ductal carcinoma of the right breast, ER/CT positive, HER 2 negative [TTNM] : 2 [NTNM] : 2 [MTNM] : 0 [de-identified] : 7360cGy [de-identified] : 5000cGy [de-identified] : right chest wall

## 2022-08-01 NOTE — REVIEW OF SYSTEMS
[Fatigue: Grade 0] : Fatigue: Grade 0 [Pruritus: Grade 1 - Mild or localized; topical intervention indicated] : Pruritus: Grade 1 - Mild or localized; topical intervention indicated [Dermatitis Radiation: Grade 1 - Faint erythema or dry desquamation] : Dermatitis Radiation: Grade 1 - Faint erythema or dry desquamation [Breast Pain: Grade 2 - Moderate pain; limiting instrumental ADL] : Breast Pain: Grade 2 - Moderate pain; limiting instrumental ADL

## 2022-08-01 NOTE — PHYSICAL EXAM
[Sclera] : the sclera and conjunctiva were normal [Hearing Threshold Finger Rub Not Sweet [place]] : hearing was normal [] : no respiratory distress [Normal] : oriented to person, place and time, the affect was normal, the mood was normal and not anxious [de-identified] : mild erythema in radiation field.  No desquamation.

## 2022-08-02 ENCOUNTER — APPOINTMENT (OUTPATIENT)
Dept: BREAST CENTER | Facility: CLINIC | Age: 72
End: 2022-08-02

## 2022-08-02 VITALS
SYSTOLIC BLOOD PRESSURE: 136 MMHG | WEIGHT: 139 LBS | DIASTOLIC BLOOD PRESSURE: 77 MMHG | HEIGHT: 60 IN | BODY MASS INDEX: 27.29 KG/M2

## 2022-08-02 PROCEDURE — G6017: CPT

## 2022-08-02 PROCEDURE — 99213 OFFICE O/P EST LOW 20 MIN: CPT

## 2022-08-02 NOTE — ASSESSMENT
[FreeTextEntry1] : JOSE FRANCISCO THOMAS is a 70 y/o post menopausal F who presents with a self palpated RIGHT breast cancer, [id number], ER/IA pos, Her 2 neg, stage IIB AJCC 8th edition. She is s/p  LEFT SSM with sentinel LN bx and RIGHT SSM with axillary dissection on 09/29/21. \par \par On physical exam: s/p B/L SSM / left SNB and right AND w/ implant reconstruction.\par RT changes to the Right reconstructed breast. \par \par ---\par AS REVIEW\par Her most recent imaging was a b/l dx mammogram and US on 6/17/2021 which revealed the following RIGHT breast findings: \par - In the axilla, @10-11N12, cortically thickened axillary lymph node measuring 2.2 cm --> METASTATIC CARCINOMA\par -C/W palpable concern @10-11N5-8, spiculated mass with associated coarse calcifications, measures 2.7 x 1.2 x 1.8 cm --> IDC\par -@11N10, circumscribed hypoechoic mass, measures 1.3 x 1.2 x 0.4 cm, --> DILATED DUCT\par -@6N5, circumscribed hypoechoic mass measuring 0.3 x 0.3 x 0.2 cm\par -@6N4, circumscribed hypoechoic mass measuring 0.6 x 0.6 x 0.2 cm --> hyalinized fibroadenoma\par \par We discussed fibroadenomas.  These are benign lesions without any malignant potential.  They are hormonally influenced and can increase or decrease in size and can also regress spontaneously.  They are considered proliferative lesions without atypia.  Patients with these lesions have been found to have a slightly increased relative risk of breast cancer compared to the reference population.  Surgical excision is recommended when the diagnosis in unclear, the lesion is causing pain or breast deformity, or if it is rapidly enlarging. \par \par She is currently asymptomatic from her right breast fibroadenoma, so no intervention is indicated for this. \par \par We discussed her new diagnosis of stage IIB RIGHT breast invasive ductal carcinoma (IDC). \par \par She may need systemic chemotherapy given the size of the tumor and positive lymph node.  After speaking with the medical oncologist, we will have her biopsy specimen sent for an ONCOTYPE to determine if she would benefit from chemotherapy. Her ONCOTYPE score was 27, and was recommended for chemotherapy with AC + T, to be given before or after surgery.  After discussion, she has decided to proceed with surgery first.  \par \par OR: RIGHT MODIFIED RADICAL MASTECTOMY, EXCISION OF RIGHT AXILLARY LYMPH NODE WITH RF LOCALIZATION; LEFT MASTECTOMY, SENTINEL LYMPH NODE MAPPING AND BIOPSY, BILATERAL PECTORAL BLOCKS, IMMEDIATE RECONSTRUCTION \par \par We discussed the option of giving chemotherapy before or after surgery.  If given before surgery, this is considered neoadjuvant chemotherapy.  The benefits of undergoing neoadjuvant chemotherapy is that it will provide us with better local control of her breast cancer especially if she has a good pathologic response.  If she has a complete clinical response in her breast and lymph nodes, we may be able to avoid an axillary lymph node dissection which would reduce the morbidity of her surgery.  In addition, it gives us prognostic information regarding her tumor response to chemotherapy.  Patients who have a complete pathologic response (pCR) were found to have an improved prognosis compared to women who do not have a pCR.  \par \par However, prior to starting chemotherapy, and given the locally advanced nature of her disease, I would also like to obtain staging scans with a PET/CT. This revealed an FDG avid R breast mass measures 3.6 x 2 cm and an FDG avid R axillary nodule, measuring 2 cm.  She did not have any other sites of abnormal FDG uptake. \par \par In regards to radiation therapy, depending on her margins, involvement of her pectoralis muscle, and number of lymph nodes positive for disease, she will need post mastectomy radiation.\par \par At the completion of all these therapy, she will need endocrine therapy, likely with an AI as she is post menopausal, which will reduce her rate of recurrence by about 50% to 2/3rds.\par \par Per [facility] consensus guidelines, any patient with a diagnosis of breast cancer should be offered panel genetic testing as 10% of these patients were found to have a mutation.  THis was offered to her and she would like to proceed with panel genetic testing.  Her blood was drawn today. 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  This is not a pathogenic mutation, however, she should continue to follow up these results, as there is a possibility that this could be re-classified in the future.  She will be referred to Kaye, genetic counselor, following her surgery. \par \par All of her questions were answered.  She knows to call with any further questions or concerns. \par \par PLAN: \par -Follow-up in six months.\par -Continue follow-up with Radiation Oncology / Medical Oncology / Plastic Surgery as scheduled.

## 2022-08-02 NOTE — HISTORY OF PRESENT ILLNESS
[FreeTextEntry1] : JOSE FRANCISCO THOMAS is a 70 y/o post menopausal F who presents with a self palpated RIGHT breast cancer, [id number], ER/[place] pos, Her 2 neg, stage IIIB AJCC 8th edition. \par \par 2021 -- b/l SSM, R ALND, L SLN Bx with immediate reconstruction \par 1. 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 LEFT breast \par -atypical lobular hyperplasia \par \par Of note, her daughter, Sierra was present for the entirety of this consultation. \par \par She first palpated a mass in the R UOQ, for the past year.  She has noticed worsening pain in that area and does think that it has increased in size since she first felt it.  She denies any left sided breast complaints and denies any nipple discharge or retraction. \par \par Her work up was as follows:\par 2021 - B/L Dx Mammo & Sono:\par -breasts are heterogeneously dense\par -R: UOQ, spiculated mass, with calcifications, c/w US finding below \par -R: Medial to this mass in the posterior depth, an area of architectural distortion. \par -R: Slightly superior to this mass, there is a an additional circumscribed mass also seen on concurrent ultrasound.\par Right breast:\par - In the axilla, at the 10 to 11:00 position 12 cm from the nipple, there is a cortically thickened axillary lymph node measuring 2.2 cm --> BIOPSY \par -Corresponding to the area palpable concern at the 10 to 11:00 position 5 to 8 cm from the nipple, there is a spiculated mass with associated coarse calcifications measuring 2.7 x 1.2 x 1.8 cm --> BIOPSY \par -At the 11:00 position 10 cm from the nipple, there is a circumscribed hypoechoic mass measuring 1.3 x 1.2 x 0.4 cm, corresponding to mammographic findings --> BIOPSY \par -At the 6:00 position 5 cm from the nipple, there is a circumscribed hypoechoic mass measuring 0.3 x 0.3 x 0.2 cm.\par -At the 6:00 position 4 cm from the nipple, there is a circumscribed hypoechoic mass measuring 0.6 x 0.6 x 0.2 cm --> BIOPSY \par Left breast:\par No suspicious solid or cystic masses. No axillary adenopathy.\par BI-RADS Category 5: Highly Suggestive of Malignancy\par \par \par 2021 - US Guided Core Bx:\par 1. Right, 10-11:00 N5-8, 2.7cm: (top-hat)\par - Invasive moderately differentiated ductal carcinoma with dominant micropapillary features.\par - Ductal carcinoma in-situ (DCIS), cribriform type with comedo necrosis and microcalcifications, intermediate nuclear grade.\par - Foci of lymphovascular invasion are present.\par -ER  (+)  99%\par -[place]  (+)  15%\par -HER2  (-) on IHC\par -Ki-67  20%\par \par 2. Right, 11:00 N10, 1.3cm: (mini-cork)\par - Benign atrophic fatty breast tissue with a dominant macrocyst\par wall fragment demonstrating an attenuated epithelial lining;\par consistent with a dilated duct.\par Findings are benign concordant.\par \par 3. Right, 6:00 N4, 0.6cm: (stoplight clip) \par - Hyalinized fibroadenoma.\par Findings are benign concordant.\par \par 4. Right, axillary mass 10-12:00 N12, 2.2cm: (twirl)\par - Lymph node fragments containing metastatic carcinoma (),\par the largest contiguous focus of which measures 6.0 mm\par (microscopic measurement).\par - No extracapsular extension is identified in this biopsy material.\par Findings are malignant concordant.\par \par HISTORICAL RISK FACTORS: \par -no prior breast biopsies, hx of lumpectomy for reportedly benign disease  \par -family history of breast cancer in a paternal aunt at age 45 \par -, age at first live birth was 24 \par -no prior OCP use \par -s/p DAVIE, unilateral oophorectomy in  \par \par Bx specimen sent for Oncotype Dx = 27.\par \par INTERVAL HISTORY:\par 2021 --\par Jose Francisco returns for a surgical re discussion.\par She met with Dr. Keyla Tejada of medical oncology and discussed option of neoadjuvant chemotherapy.\par As per Dr. Tejada, after informed discussion, Jose Francisco is thinking to have mastectomy. She wants to proceed with surgery first and have chemotherapy after surgery. She understands that she will need to have right axillary lymph node dissection which involves more tissue removed and increased risk of lymphedema. \par \par She has also met with Dr. Priscilla Barrios of plastic surgery for consultation. She has elected to proceed with bilateral mastectomy.  Dr. Barrios feels she is a good candidate for prosthetic breast reconstruction. After a long discussion she has decided to proceed with direct-to-silicone implant; possible tissue expander with mesh, followed by silicone implant at a later date. \par \par SInce her last visit, she also underwent a PET/CT on 2021 and breast MRI on 2021 which did not reveal any additional areas of disease aside from her her right breast mass and metastatic right axillary lymph node. \par \par INTERVAL HISTORY: 10/12/21\par Jose Francisco is 71 year old female here for her FIRST POST OP apt s/p  LEFT SSM with sentinel LN bx and RIGHT SSM with axillary dissection on 21 \par \par She is feeling well\par She denies any fever/chills or erythema and / or drainage related to incision area. \par Her pain is well controlled, only complains of mild tenderness of the area.\par \par \par INTERVAL HISTORY 5/3/22\par Jose Francisco is here for her SIX MONTHS FOLLOW UP VISIT \par She finished dose-dense AC x 4. She is currently on weekly Taxol.\par \par INTERVAL HISTORY 22\par Jose Francisco is here for her FIRST POST OP VISIT s/p mediport removal on 22\par \par \par She is feeling well\par She denies any fever/chills or erythema and / or drainage related to incision area. \par Her pain is well controlled, only complains of mild tenderness of the area.\par Her pathology was discussed. \par \par She has not started Letrozole but has meds available at home.\par \par Her breast MRI showed No evidence of malignancy and was deemed BIRADS2\par \par 2022 --\par JOSE FRANCISCO THOMAS is a 72 year old female patient who presents today for a follow-up visit.\par She is currently receiving WBI with Dr. Ana Bagley to the right chest wall.\par \par

## 2022-08-02 NOTE — PHYSICAL EXAM
[Normocephalic] : normocephalic [Atraumatic] : atraumatic [EOMI] : extra ocular movement intact [No Supraclavicular Adenopathy] : no supraclavicular adenopathy [No Cervical Adenopathy] : no cervical adenopathy [Examined in the supine and seated position] : examined in the supine and seated position [No dominant masses] : no dominant masses in right breast  [No dominant masses] : no dominant masses left breast [No Axillary Lymphadenopathy] : no left axillary lymphadenopathy [No Ulceration] : no ulceration [de-identified] : s/p B/L SSM / left SNB and right AND w/ implant reconstruction.\par RT changes to the Right reconstructed breast.

## 2022-08-04 PROCEDURE — G6017: CPT

## 2022-08-05 PROCEDURE — G6017: CPT

## 2022-08-05 NOTE — ASSESSMENT
[FreeTextEntry1] : 70 y/o post menopausal F, , with PMH of scoliosis, HTN, Cervical CA s/p hysterectomy, and newly diagnosed RIGHT breast cancer with recent double mastectomy and recent breast reconstruction s/p left port a cath placement on  presents today for 1 week follow up.\par \par Reassured patient pulling sensation felt is normal due to tight pocket created for port, and will dissipate as she heals. Pinching sensation likely due to irritation from dermabond vs healing suture. Unable to fully assess due to steri strips and dermabond still covering suture site. Patient to follow up Tuesday prior to chemotherapy treatment to reassess. Re-educated for patient to leave steri strips and dermabond on and gently wash around area until it falls off on its own. \par \par Educated patient on signs and symptoms of infection, told to contact us for any further questions or concerns. \par \par Recommended patient follow up with heme/onc regarding lump on arm due to current history.\par \par Port a cath ok for continued use.

## 2022-08-05 NOTE — REVIEW OF SYSTEMS
[Fever] : no fever [Chills] : no chills [Feeling Poorly] : not feeling poorly [Feeling Tired] : not feeling tired [Eye Pain] : no eye pain [Red Eyes] : eyes not red [Heart Rate Is Slow] : the heart rate was not slow [Heart Rate Is Fast] : the heart rate was not fast [Chest Pain] : no chest pain [Palpitations] : no palpitations [Shortness Of Breath] : no shortness of breath [Wheezing] : no wheezing [Cough] : no cough [SOB on Exertion] : no shortness of breath during exertion [Abdominal Pain] : no abdominal pain [Vomiting] : no vomiting [Constipation] : no constipation [Diarrhea] : no diarrhea [Itching] : no itching [Confused] : no confusion [Dizziness] : no dizziness

## 2022-08-05 NOTE — HISTORY OF PRESENT ILLNESS
[FreeTextEntry1] : 72 y/o post menopausal F, , with PMH of scoliosis, HTN, Cervical CA s/p hysterectomy, and newly diagnosed RIGHT breast cancer with recent double mastectomy and recent breast reconstruction s/p left port a cath placement on  presents today for 1 week follow up.\par \par Patient appears well, states she has been experiencing a mild pinching type pain at the port site, as well as a tight pulling sensation when moving. Patient denies fevers/chills, or N/V/D. \par \par On examination, port a cath site looks clean, dry and intact. Telfa dressing removed from both access site and port a cath site prior to visit. No erythema or swelling present. Mild ecchymosis noted along neck, patient states it has improved since placement. No tenderness or drainage noted on manipulation. Port access site also C/D/I. No erythema, swelling or tenderness noted. No neck pain, no limited ROM. Dermabond and Steri strips on site. \par \par Patient asked for me to evaluate a small lump on left upper arm. Patient states she has noticed it for a few months but it has since grown in size. Patient states at times it causes irritation and numbness which radiates to the forearm. Patient has had two lymph nodes removed on her left side with mastectomy.

## 2022-08-05 NOTE — ASSESSMENT
[FreeTextEntry1] : 72 y/o post menopausal F, , with PMH of scoliosis, HTN, Cervical CA s/p hysterectomy, and newly diagnosed RIGHT breast cancer with recent double mastectomy and recent breast reconstruction s/p left port a cath placement on  presents today for second follow up post placement.\par \par Despite concerns, no evidence of port infection, or blockage/clot within port catheter. Reassured patients port is safe to use and may be taking longer to heal due to adjacent implant surgery. Port may be used for chemotherapy on next treatment. Patient was instructed to call if symptoms get worse. \par \par Educated patient on signs and symptoms of infection, told to contact us for any further questions or concerns. \par \par Port a cath ok for continued use.

## 2022-08-05 NOTE — HISTORY OF PRESENT ILLNESS
[FreeTextEntry1] : 72 y/o post menopausal F, , with PMH of scoliosis, HTN, Cervical CA s/p hysterectomy, and newly diagnosed RIGHT breast cancer with recent double mastectomy and recent breast reconstruction s/p left port a cath placement on  presents today for second follow up post placement.\par \par Patient appears well, states overall her symptoms have improved but she is still feeling slight pain and tenderness at the port site as well as slight swelling of left arm. Patient denies fevers/chills, or N/V/D. \par \par On examination, port access site looks clean, dry and intact. No erythema  or swelling present. No tenderness or drainage noted on manipulation. Port a cath insertion site also C/D/I. No erythema or swelling noted. Mild ecchymosis noted surrounding area with minimal tenderness on palpation. No limited ROM, neck pain resolved. Dermabond and Steri strips on site. \par \par Due to prior complaint of neck pain/tightness and new symptom of arm swelling, patient was evaluated for clot formation. Ultrasound used to evaluate jugular vein, vein patent with no evidence of clot. \par \par \par

## 2022-08-22 ENCOUNTER — NON-APPOINTMENT (OUTPATIENT)
Age: 72
End: 2022-08-22

## 2022-08-22 VITALS
HEART RATE: 69 BPM | TEMPERATURE: 97.3 F | DIASTOLIC BLOOD PRESSURE: 74 MMHG | WEIGHT: 138.13 LBS | SYSTOLIC BLOOD PRESSURE: 139 MMHG | OXYGEN SATURATION: 98 % | BODY MASS INDEX: 26.98 KG/M2 | RESPIRATION RATE: 12 BRPM

## 2022-08-22 DIAGNOSIS — Z98.890 OTHER SPECIFIED POSTPROCEDURAL STATES: ICD-10-CM

## 2022-08-22 PROCEDURE — 77427 RADIATION TX MANAGEMENT X5: CPT

## 2022-08-22 NOTE — PHYSICAL EXAM
[Sclera] : the sclera and conjunctiva were normal [Hearing Threshold Finger Rub Not [place]] : hearing was normal [] : no respiratory distress [Respiration, Rhythm And Depth] : normal respiratory rhythm and effort [Exaggerated Use Of Accessory Muscles For Inspiration] : no accessory muscle use [Heart Rate And Rhythm] : heart rate and rhythm were normal [___] : a [unfilled] ~Ucm mastectomy scar [Breast Reconstruction Right] : breast reconstruction [Nail Clubbing] : no clubbing  or cyanosis of the fingernails [Motor Tone] : muscle strength and tone were normal [Motor Exam] : the motor exam was normal [Normal] : oriented to person, place and time, the affect was normal, the mood was normal and not anxious [Enlargement Of The Right Breast] : no swelling [Tenderness Of The Right Breast] : no tenderness [de-identified] : Healed grade 2 breakdown.  Erythema has resolved.

## 2022-08-22 NOTE — REVIEW OF SYSTEMS
[Fatigue: Grade 0] : Fatigue: Grade 0 [Breast Pain: Grade 0] : Breast Pain: Grade 0 [Pruritus: Grade 0] : Pruritus: Grade 0 [Skin Hyperpigmentation: Grade 1 - Hyperpigmentation covering <10% BSA; no psychosocial impact] : Skin Hyperpigmentation: Grade 1 - Hyperpigmentation covering <10% BSA; no psychosocial impact [Dermatitis Radiation: Grade 2 - Moderate to brisk erythema; patchy moist desquamation, mostly confined to skin folds and creases; moderate edema] : Dermatitis Radiation: Grade 2 - Moderate to brisk erythema; patchy moist desquamation, mostly confined to skin folds and creases; moderate edema

## 2022-08-22 NOTE — DISEASE MANAGEMENT
[Pathological] : TNM Stage: p [IB] : IB [FreeTextEntry4] : Invasive moderately differentiated ductal carcinoma of the right breast, ER/[place] positive, HER 2 negative [TTNM] : 2 [NTNM] : 2 [MTNM] : 0 [de-identified] : 4000cGy [de-identified] : 5000cGy [de-identified] : right chest wall

## 2022-08-22 NOTE — HISTORY OF PRESENT ILLNESS
[FreeTextEntry1] : Patent returns to resume her XRT.  She claims that she needed a break, had discomfort and skin changes (breakdown at the inferior skin crease).  She wishes to have 3 treatments this week with a break on Thursday and Friday.  Followed by completing next week.  \par \par She continues to moisturize with Aquaphor.  No new concerns.

## 2022-08-23 PROCEDURE — G6017: CPT

## 2022-08-24 PROCEDURE — G6017: CPT

## 2022-08-29 ENCOUNTER — NON-APPOINTMENT (OUTPATIENT)
Age: 72
End: 2022-08-29

## 2022-08-29 VITALS
HEART RATE: 71 BPM | DIASTOLIC BLOOD PRESSURE: 63 MMHG | OXYGEN SATURATION: 99 % | RESPIRATION RATE: 14 BRPM | SYSTOLIC BLOOD PRESSURE: 133 MMHG | BODY MASS INDEX: 27.17 KG/M2 | TEMPERATURE: 97.7 F | WEIGHT: 139.13 LBS

## 2022-08-29 DIAGNOSIS — Z91.89 OTHER SPECIFIED PERSONAL RISK FACTORS, NOT ELSEWHERE CLASSIFIED: ICD-10-CM

## 2022-08-29 PROCEDURE — G6017: CPT

## 2022-08-29 NOTE — HISTORY OF PRESENT ILLNESS
[FreeTextEntry1] : Patient reports doing well.  Skin looks good, continues to apply Aquaphor daily, no breast pain.  We discussed completing XRT this week.  She requested script for occupation therapy so she may continue closer to home.  She has been receiving OT at [facility].   She has no new concerns.

## 2022-08-29 NOTE — PHYSICAL EXAM
[Sclera] : the sclera and conjunctiva were normal [Hearing Threshold Finger Rub Not [place]] : hearing was normal [] : no respiratory distress [Respiration, Rhythm And Depth] : normal respiratory rhythm and effort [Exaggerated Use Of Accessory Muscles For Inspiration] : no accessory muscle use [Heart Rate And Rhythm] : heart rate and rhythm were normal [___] : a [unfilled] ~Ucm mastectomy scar [Breast Reconstruction Right] : breast reconstruction [Nail Clubbing] : no clubbing  or cyanosis of the fingernails [Motor Exam] : the motor exam was normal [Normal] : oriented to person, place and time, the affect was normal, the mood was normal and not anxious [Enlargement Of The Right Breast] : no swelling [Tenderness Of The Right Breast] : no tenderness [de-identified] : no desquamation, residual hyperpigmentation at IMF/axilla

## 2022-08-29 NOTE — DISEASE MANAGEMENT
[Pathological] : TNM Stage: p [IB] : IB [FreeTextEntry4] : Invasive moderately differentiated ductal carcinoma of the right breast, ER/[place] positive, HER 2 negative [TTNM] : 2 [NTNM] : 2 [MTNM] : 0 [de-identified] : 4350cGy [de-identified] : 5000cGy [de-identified] : right chest wall & Sclav

## 2022-08-30 PROCEDURE — G6017: CPT

## 2022-08-31 PROCEDURE — G6017: CPT

## 2022-08-31 PROCEDURE — 77427 RADIATION TX MANAGEMENT X5: CPT

## 2022-10-06 ENCOUNTER — APPOINTMENT (OUTPATIENT)
Dept: HEMATOLOGY ONCOLOGY | Facility: CLINIC | Age: 72
End: 2022-10-06

## 2022-10-06 ENCOUNTER — APPOINTMENT (OUTPATIENT)
Dept: RADIATION ONCOLOGY | Facility: HOSPITAL | Age: 72
End: 2022-10-06

## 2022-10-06 VITALS
BODY MASS INDEX: 27.29 KG/M2 | TEMPERATURE: 99 F | SYSTOLIC BLOOD PRESSURE: 123 MMHG | WEIGHT: 139 LBS | RESPIRATION RATE: 14 BRPM | HEART RATE: 70 BPM | HEIGHT: 60 IN | DIASTOLIC BLOOD PRESSURE: 79 MMHG

## 2022-10-06 VITALS
DIASTOLIC BLOOD PRESSURE: 70 MMHG | TEMPERATURE: 98.9 F | HEART RATE: 77 BPM | SYSTOLIC BLOOD PRESSURE: 125 MMHG | OXYGEN SATURATION: 97 % | WEIGHT: 139 LBS | RESPIRATION RATE: 12 BRPM | BODY MASS INDEX: 27.15 KG/M2

## 2022-10-06 DIAGNOSIS — Z92.3 PERSONAL HISTORY OF IRRADIATION: ICD-10-CM

## 2022-10-06 DIAGNOSIS — Z79.811 LONG TERM (CURRENT) USE OF AROMATASE INHIBITORS: ICD-10-CM

## 2022-10-06 PROCEDURE — 99024 POSTOP FOLLOW-UP VISIT: CPT

## 2022-10-06 PROCEDURE — 99215 OFFICE O/P EST HI 40 MIN: CPT

## 2022-10-06 NOTE — REVIEW OF SYSTEMS
[Negative] : Psychiatric [Fever] : no fever [Chills] : no chills [Fatigue] : no fatigue [Dysphagia] : no dysphagia [Chest Pain] : no chest pain [Palpitations] : no palpitations [Shortness Of Breath] : no shortness of breath [Wheezing] : no wheezing [Cough] : no cough [Muscle Pain] : no muscle pain

## 2022-10-06 NOTE — PHYSICAL EXAM
[Sclera] : the sclera and conjunctiva were normal [Hearing Threshold Finger Rub Not [place]] : hearing was normal [Normal] : no respiratory distress, lungs were clear to auscultation bilaterally [Heart Rate And Rhythm] : heart rate and rhythm were normal [Heart Sounds] : normal S1 and S2 [Murmurs] : no murmurs present [Edema] : no peripheral edema present [Tenderness Of The Right Breast] : tenderness [Right Breast Absent] : a total mastectomy [Breast Reconstruction Right] : breast reconstruction [Tenderness Of The Left Breast] : tenderness [Left Breast Absent] : a total mastectomy [Breast Reconstruction Left] : breast reconstruction [No Masses] : no breast masses were palpable [Abdomen Soft] : soft [Nondistended] : nondistended [Abdomen Tenderness] : non-tender [Cervical Lymph Nodes Enlarged Posterior Bilaterally] : posterior cervical [Cervical Lymph Nodes Enlarged Anterior Bilaterally] : anterior cervical [Supraclavicular Lymph Nodes Enlarged Bilaterally] : supraclavicular [Nail Clubbing] : no clubbing  or cyanosis of the fingernails [Motor Tone] : muscle strength and tone were normal [Enlargement Of The Right Breast] : no swelling [___] :  no erythema [Enlargement Of The Left Breast] : no swelling [Axillary Lymph Nodes Enlarged Bilaterally] : no enlarged nodes [de-identified] : mild residual hyperpigmentation

## 2022-10-06 NOTE — HISTORY OF PRESENT ILLNESS
[FreeTextEntry1] : JOSE FRANCISCO THOMAS returns to clinic in follow up visit.  As you know, JOSE FRANCISCO THOMAS is a 72 year old female with invasive moderately differentiated ductal carcinoma of the right breast, ER/[place] positive, HER 2 negative, [id number]. She is s/p right MRM and left simple mastectomy and SLN, direct to implant reconstructions, and ACT chemotherapy. The patient was treated to the right reconstructed breast and regional nodes using a 3D Conformal (3D CRT) technique.  The patient tolerated treatments quite well. The patient had the expected side effects of skin erythema and fatigue. She was non-compliant with her treatment plan and schedule.  \par The patient received 5,000 cGy to the right reconstructive breast and right supraclavicular nodes from 6/30/2022  through 8/31/2022 without complications.  I last saw her in August, 2022.    In the interim, the patient reports doing well.  She was started on Verzrnio will take it for 2 years along with exemestane (switched from Letrozole 2/2 sever arthralgia), prescribed by Dr. Tejada. She continues to have the same breast discomfort since her procedure.  She continues to have limited b/l  ROM both in flexion and extension, doing home exercises.  She states, did not want to pay $40 for OT cessions by her home and she does not want to continue with OT here at [facility].     \par  \par Upcoming appointments: \par Dr. Barrios 10/31/2022\par Dr. Tejada 11/3/2022\par Erin 2/10/2023\par \par

## 2022-10-06 NOTE — VITALS
[Maximal Pain Intensity: 8/10] : 8/10 [Least Pain Intensity: 0/10] : 0/10 [NoTreatment Scheduled] : no treatment scheduled [80: Normal activity with effort; some signs or symptoms of disease.] : 80: Normal activity with effort; some signs or symptoms of disease.

## 2022-10-06 NOTE — DISEASE MANAGEMENT
[Pathological] : TNM Stage: p [IB] : IB [FreeTextEntry4] : Invasive moderately differentiated ductal carcinoma of the right breast, ER/[place] positive, HER 2 negative [TTNM] : 2 [NTNM] : 2 [MTNM] : 0 [de-identified] : 5000cGy [de-identified] : 5000cGy [de-identified] : right chest wall & Sclav

## 2022-10-11 RX ORDER — CALCIUM CARBONATE/VITAMIN D3 500 MG-10
500-3.125 TABLET ORAL
Refills: 0 | Status: ACTIVE | COMMUNITY
Start: 2022-10-11

## 2022-10-16 LAB
ALBUMIN SERPL ELPH-MCNC: 4.8 G/DL
ALP BLD-CCNC: 72 U/L
ALT SERPL-CCNC: 12 U/L
ANION GAP SERPL CALC-SCNC: 13 MMOL/L
AST SERPL-CCNC: 18 U/L
BASOPHILS # BLD AUTO: 0.02 K/UL
BASOPHILS NFR BLD AUTO: 0.3 %
BILIRUB DIRECT SERPL-MCNC: <0.2 MG/DL
BILIRUB INDIRECT SERPL-MCNC: >0.2 MG/DL
BILIRUB SERPL-MCNC: 0.4 MG/DL
BUN SERPL-MCNC: 18 MG/DL
CALCIUM SERPL-MCNC: 9.5 MG/DL
CANCER AG15-3 SERPL-ACNC: 27.8 U/ML
CEA SERPL-MCNC: 1.9 NG/ML
CHLORIDE SERPL-SCNC: 103 MMOL/L
CO2 SERPL-SCNC: 24 MMOL/L
CREAT SERPL-MCNC: 0.7 MG/DL
EGFR: 93 ML/MIN/1.73M2
EOSINOPHIL # BLD AUTO: 0.19 K/UL
EOSINOPHIL NFR BLD AUTO: 3 %
GLUCOSE SERPL-MCNC: 90 MG/DL
HCT VFR BLD CALC: 38.7 %
HGB BLD-MCNC: 13.4 G/DL
IMM GRANULOCYTES NFR BLD AUTO: 0.2 %
LYMPHOCYTES # BLD AUTO: 0.63 K/UL
LYMPHOCYTES NFR BLD AUTO: 9.8 %
MAN DIFF?: NORMAL
MCHC RBC-ENTMCNC: 30 PG
MCHC RBC-ENTMCNC: 34.6 G/DL
MCV RBC AUTO: 86.6 FL
MONOCYTES # BLD AUTO: 0.71 K/UL
MONOCYTES NFR BLD AUTO: 11.1 %
NEUTROPHILS # BLD AUTO: 4.86 K/UL
NEUTROPHILS NFR BLD AUTO: 75.6 %
PLATELET # BLD AUTO: 220 K/UL
POTASSIUM SERPL-SCNC: 4.3 MMOL/L
PROT SERPL-MCNC: 7.2 G/DL
RBC # BLD: 4.47 M/UL
RBC # FLD: 13.2 %
SODIUM SERPL-SCNC: 140 MMOL/L
T4 FREE SERPL-MCNC: 1.2 NG/DL
TSH SERPL-ACNC: 1.47 UIU/ML
WBC # FLD AUTO: 6.42 K/UL

## 2022-10-16 NOTE — HISTORY OF PRESENT ILLNESS
[de-identified] : JOSE FRANCISCO THOMAS is a 71 year old female PMHx of cervical cancer s/p hysterectomy and unilateral salpingo-oophorectomy,  HTN presents for newly diagnosed invasive breast cancer. \par \par Patient first palpated a mass in the RUOQ herself within the past year. She had pain at the site and feels that it is growing. She went for b/l diagnostic mammo and sonogram on 6/17/2021. Her left breast had no suspicious findings however on her right breast a spiculated mass in the RUOQ as well as a second mass superior to with a thickened axillary 2.2 cm axillary node was seen. \par -breasts are heterogeneously dense\par -R: UOQ, spiculated mass, with calcifications, c/w US finding below \par -R: Medial to this mass in the posterior depth, an area of architectural distortion. \par -R: Slightly superior to this mass, there is a an additional circumscribed mass also seen on concurrent ultrasound.\par Right breast:\par - In the axilla, at the 10 to 11:00 position 12 cm from the nipple, there is a cortically thickened axillary lymph node measuring 2.2 cm --> BIOPSY \par -Corresponding to the area palpable concern at the 10 to 11:00 position 5 to 8 cm from the nipple, there is a spiculated mass with associated coarse calcifications measuring 2.7 x 1.2 x 1.8 cm --> BIOPSY \par -At the 11:00 position 10 cm from the nipple, there is a circumscribed hypoechoic mass measuring 1.3 x 1.2 x 0.4 cm, corresponding to mammographic findings --> BIOPSY \par -At the 6:00 position 5 cm from the nipple, there is a circumscribed hypoechoic mass measuring 0.3 x 0.3 x 0.2 cm.\par -At the 6:00 position 4 cm from the nipple, there is a circumscribed hypoechoic mass measuring 0.6 x 0.6 x 0.2 cm --> BIOPSY \par BI-RADS Category 5: Highly Suggestive of Malignancy\par \par On 7/21/2021, she went for US guided biopsy of  the 2cm mass which showed invasive moderately differential ductal carcinoma, with dominant micrpapillary features\par 1. Breast, right 10-11 N5-8 mass, ultrasound guided needle core\par biopsies:\par - Invasive moderately differentiated ductal carcinoma with dominant micropapillary features, ER 99%, RI 15%. Ki67 20%, Her 2 negative (IHC 1+, Her2/CEP17 1.9)\par - Ductal carcinoma in-situ (DCIS), cribriform type with comedo\par necrosis and microcalcifications, intermediate nuclear grade.\par - Foci of lymphovascular invasion are present.\par \par 2. Breast, right 11 N10 mass, ultrasound guided needle core biopsies:\par - Benign atrophic fatty breast tissue with a dominant macrocyst wall fragment demonstrating an attenuated epithelial lining; consistent with a dilated duct.\par \par 3. Breast, right 6 N4 mass, ultrasound guided needle core biopsies:\par - Hyalinized fibroadenoma.\par \par 4. Breast, right axillary mass, ultrasound guided needle core biopsies:\par - Lymph node fragments containing metastatic carcinoma (1/1), the largest contiguous focus of which measures 6.0 mm (microscopic measurement).\par - No extracapsular extension is identified in this biopsy material.\par \par On 8/16/21, she had a PET/CT which showed pathologic FDG uptake (SUV 7) coregistering with 3.6 x 2 cm right breast mass and pathologic FDG uptake in 2 cm right axillary nodule (SUV 6.1) consistent with documented biologic tumor activity. No other sites of abnormal FDG uptake\par \par On 8/18/21, she had b/l breast MRI MRI which showed the biopsy proven right breast carcinoma 1.9 x 3.7 x 3.5 cm and 2 cm right axillary adenopathy. No additional suspicious enhancement in either breast. \par \par She saw Dr. Ferguson for surgical consultation. She was given options to have neoadjuvant chemo followed by surgery or immediate surgery. \par \par Her family history is significant for breast cancer in her paternal aunt in her 40s,  2 uncles who had "brain cancer"; and her mom who had cervical cancer. \par  [de-identified] : 10/27/21:\dharmesh Galicia is here today for followup visit. She was previously seen on 8/20/21. She was diagnosed with  IDC of the right breast with 3 cm mass and positive right axillary lymph node, ER/OR positive, Her-2 negative. Clinical stage is T3N1 with Oncotype RS 27. She was given an option to have neoadjuvant chemotherapy prior to surgery but she opted to have surgery first. On 9/29/21, she underwent right breast modified radical skin-sparing mastectomy, right ALND, left breast simple skin-sparing mastectomy and left axillary SLNB with immediate direct to implant reconstruction. \par - The right mastectomy revealed 3.7 cm invasive poorly differentiated micropapillary carcinoma, ER 85%, OR 15%, Her-2 negative (FISH ration 1.8), Ki 67 20-25% and 0.8 cm invasive well differentiated tubulo-lobular variant of ductal carcinoma, %, OR 90%, Her-2 negative and Ki 67 3-5%,  Non-extensive ductal carcinoma in-situ (DCIS), cribriform and micropapillary types with comedo necrosis and microcalcifications, intermediate to focally high nuclear grade. Numerous foci of lymphovascular invasion are present. The invasive tumor extends to focally involve (touches both ink and cautery) the blue inked surface. The nipple, skin, and deep fascial margin are negative for carcinoma. Right ALND reveal metastatic carcinoma present in eight out of twelve axillary lymph nodes (8/12) with extensive extracapsular extension seen. The largest node contains a metallic "ring-shaped" biopsy clip and shows focal prior biopsy site changes.\par AJCC 8th Edition Pathologic Stage, m(2)pT2, pN2a, pMx.\par - The left breast simple skin-sparing mastectomy show atrophic predominantly fatty breast tissue with focal atypical lobular hyperplasia (ALH). 2 sentinel lymph noses are negative for malignancy.  \par She recovered well from surgery and is here today to discuss adjuvant systemic therapy. \par \par 12/2/21\dharmesh Galicia is here today for followup visit and chemotherapy. She has stage IIIA ([id number]) right breast multifocal invasive poorly differentiated micropapillary carcinoma, 3.7 cm and invasive well differentiated tubulo-lobular variant of ductal carcinoma, 0.8 cm, ER/OR positive, Her-2 negative, s/p right breast modified radical skin-sparing mastectomy, right ALND, left breast simple skin-sparing mastectomy and left axillary SLNB with immediate implant reconstruction. 8/12 right axillary lymph nodes are positive for metastatic carcinoma with extensive extracapsular extension. Oncotype RS is 27. She is indicated for adjuvant chemotherapy.  She had port a cath placed on 11/18 to left chest wall and felt pain at port site with movement of left upper extremity. She followed up with IR regarding concerns, no evidence of port infection, or blockage/clot within port catheter. She also had HOMA done on 9/30 LV EF normal. \par \par 12/16/21\dharmesh Galicia is here today for followup visit and chemotherapy. She has stage IIIA ([id number]) right breast multifocal invasive poorly differentiated micropapillary carcinoma, 3.7 cm and invasive well differentiated tubulo-lobular variant of ductal carcinoma, 0.8 cm, ER/OR positive, Her-2 negative, s/p right breast modified radical skin-sparing mastectomy, right ALND, left breast simple skin-sparing mastectomy and left axillary SLNB with immediate implant reconstruction. 8/12 right axillary lymph nodes are positive for metastatic carcinoma with extensive extracapsular extension. Oncotype RS is 27. She started adjuvant chemotherapy with dose-dense AC. To date she received 1 cycle of AC, tolerating well. She denies nausea, vomiting, diarrhea or breast pain. She c/o that her constipation is worse from her baseline, taking Senna twice daily.\par She also had HOMA done on 9/30/21 LV EF normal. \par \par 12/30/21\dharmesh Galicia is here today for followup visit and chemotherapy. She has stage IIIA ([id number]) right breast multifocal invasive poorly differentiated micropapillary carcinoma, 3.7 cm and invasive well differentiated tubulo-lobular variant of ductal carcinoma, 0.8 cm, ER/OR positive, Her-2 negative, s/p right breast modified radical skin-sparing mastectomy, right ALND, left breast simple skin-sparing mastectomy and left axillary SLNB with immediate implant reconstruction. 8/12 right axillary lymph nodes are positive for metastatic carcinoma with extensive extracapsular extension. She started adjuvant chemotherapy with dose-dense AC. To date she received 2 cycles of AC, tolerating well. She denies nausea, vomiting, diarrhea or breast pain. Her constipation has improved with senna 3 tablets and colace. She also had HOMA done on 9/30/21 LV EF normal. \par \par 1/13/22:jossie Galicia is here today for followup visit and chemotherapy. She has stage IIIA ([id number]) right breast multifocal invasive poorly differentiated micropapillary carcinoma, 3.7 cm and invasive well differentiated tubulo-lobular variant of ductal carcinoma, 0.8 cm, ER/OR positive, Her-2 negative, s/p right breast modified radical skin-sparing mastectomy, right ALND, left breast simple skin-sparing mastectomy and left axillary SLNB with immediate implant reconstruction. 8/12 right axillary lymph nodes are positive for metastatic carcinoma with extensive extracapsular extension. She has been on adjuvant chemotherapy with dose-dense AC. To date she received 3 cycles of AC, tolerating well. She saw Dr. Robert and had repeat 2D Echo. \par \par 1/27/22:jossie Galicia is here today for followup visit and chemotherapy. She has stage IIIA ([id number]) right breast multifocal invasive poorly differentiated micropapillary carcinoma, 3.7 cm and invasive well differentiated tubulo-lobular variant of ductal carcinoma, 0.8 cm, ER/OR positive, Her-2 negative, s/p right breast modified radical skin-sparing mastectomy, right ALND, left breast simple skin-sparing mastectomy and left axillary SLNB with immediate implant reconstruction. 8/12 right axillary lymph nodes are positive for metastatic carcinoma with extensive extracapsular extension. She has been on adjuvant chemotherapy. She finished dose-dense AC x 4. She reported white coating on oral mucosa. She was given Nystatin swishing and spiting. She is feeling better now. She saw Dr. Robert and had repeat 2D Echo. \par \par 2/24/22jossie Galicia is here today for followup visit and chemotherapy. She has stage IIIA ([id number]) right breast multifocal invasive poorly differentiated micropapillary carcinoma, 3.7 cm and invasive well differentiated tubulo-lobular variant of ductal carcinoma, 0.8 cm, ER/OR positive, Her-2 negative, s/p right breast modified radical skin-sparing mastectomy, right ALND, left breast simple skin-sparing mastectomy and left axillary SLNB with immediate implant reconstruction. 8/12 right axillary lymph nodes are positive for metastatic carcinoma with extensive extracapsular extension. She has been on adjuvant chemotherapy. She finished dose-dense AC x 4. She is currently on weekly Taxol. Today, she is scheduled for 5/12 Taxol.  She c/o bloatedness, hearing loss, anxiety, nervousness, feeling agitated.  She is not happy about gaining 5 lbs in 1 month.  Denies fever, N/V/C/D, paresthesias.\par \par 3/24/22:jossie Floresmary is here today for followup visit and chemotherapy. She has stage IIIA ([id number]) right breast multifocal invasive poorly differentiated micropapillary carcinoma, 3.7 cm and invasive well differentiated tubulo-lobular variant of ductal carcinoma, 0.8 cm, ER/OR positive, Her-2 negative, s/p right breast modified radical skin-sparing mastectomy, right ALND, left breast simple skin-sparing mastectomy and left axillary SLNB with immediate implant reconstruction. 8/12 right axillary lymph nodes are positive for metastatic carcinoma with extensive extracapsular extension. She has been on adjuvant chemotherapy. She finished dose-dense AC x 4. She is currently on weekly Taxol. Today, she is scheduled for 9/12 Taxol.  She reports chemotherapy-related nail changes. \par \par 4/14/22:jossie Galicia is here today for followup visit and chemotherapy. She has stage IIIA ([id number]) right breast multifocal invasive poorly differentiated micropapillary carcinoma, 3.7 cm and invasive well differentiated tubulo-lobular variant of ductal carcinoma, 0.8 cm, ER/OR positive, Her-2 negative, s/p right breast modified radical skin-sparing mastectomy, right ALND, left breast simple skin-sparing mastectomy and left axillary SLNB with immediate implant reconstruction. 8/12 right axillary lymph nodes are positive for metastatic carcinoma with extensive extracapsular extension. She has been on adjuvant chemotherapy. She finished dose-dense AC x 4. She is currently on weekly Taxol. Today, she is scheduled for last treatment (week 12 Taxol).  \par \par 5/12/2022jossie Galicia is here today for followup visit. She has stage IIIA ([id number]) right breast multifocal invasive poorly differentiated micropapillary carcinoma, 3.7 cm and invasive well differentiated tubulo-lobular variant of ductal carcinoma, 0.8 cm, ER/OR positive, Her-2 negative, s/p right breast modified radical skin-sparing mastectomy, right ALND, left breast simple skin-sparing mastectomy and left axillary SLNB with immediate implant reconstruction. 8/12 right axillary lymph nodes are positive for metastatic carcinoma with extensive extracapsular extension. She completed adjuvant chemotherapy with dose-dense AC x 4 followed by weekly Taxol x 12. She followed up with Dr Ferguson and is planned for port removal on 5/23/22 and MRI breast on 5/16/22. She has not started Letrozole but has meds available at home. She is due for baseline DEXA scan.\par \par 7/22/22\dharmesh Galicia is here today for followup visit. She has stage IIIA ([id number]) right breast multifocal invasive poorly differentiated micropapillary carcinoma, 3.7 cm and invasive well differentiated tubulo-lobular variant of ductal carcinoma, 0.8 cm, ER/OR positive, Her-2 negative, s/p right breast modified radical skin-sparing mastectomy, right ALND, left breast simple skin-sparing mastectomy and left axillary SLNB with immediate implant reconstruction. 8/12 right axillary lymph nodes are positive for metastatic carcinoma with extensive extracapsular extension. She completed adjuvant chemotherapy with dose-dense AC x 4 followed by weekly Taxol x 12. She had port removed on with Dr Ferguson.  She started adjuvant PMRT on 6/14. She started letrozole in May, experienced increasing arthralgia and hot flashes. She stopped taking letrozole 2 weeks ago. She also stopped metoprolol and candesartan, for low BP at home, according to patient it was 100/60s as per patient. She has not followed up with PMD or cardiologist. She had DEXA on 5/16/22 which showed osteopenia T score -1.2. She had bilateral breast MRI on 5/16/21 which showed no evidence of malignancy. \par \par 10/6/22jossie Frida is here today for followup visit. She has stage IIIA ([id number]) right breast multifocal invasive poorly differentiated micropapillary carcinoma, ER/OR positive, Her-2 negative, s/p right breast modified radical skin-sparing mastectomy, right ALND, left breast simple skin-sparing mastectomy and left axillary SLNB with immediate implant reconstruction. 8/12 right axillary lymph nodes are positive for metastatic carcinoma with extensive extracapsular extension. She completed adjuvant chemotherapy with dose-dense AC x 4 followed by weekly Taxol x 12. She completed adjuvant PMRT from 6/30/2022 through 8/31/2022. She started letrozole in May, experienced increasing arthralgia and hot flashes, therefore stopped in July. She had DEXA on 5/16/22 which showed osteopenia T score -1.2. She had bilateral breast MRI on 5/16/21 which showed no evidence of malignancy. She feels well and does not have new complains.

## 2022-10-16 NOTE — ASSESSMENT
[FreeTextEntry1] : 71 yo female has stage IIIA ([id number]) right breast multifocal invasive poorly differentiated micropapillary carcinoma, 3.7 cm and invasive well differentiated tubulo-lobular variant of ductal carcinoma, 0.8 cm, ER/CA positive, Her-2 negative, s/p right breast modified radical skin-sparing mastectomy, right ALND, left breast simple skin-sparing mastectomy and left axillary SLNB with immediate direct to implant reconstruction. 8/12 right axillary lymph nodes are positive for metastatic carcinoma with extensive extracapsular extension.\par Oncotype RS is 27. \par Staging PET/CT on 8/16/21 was negative for distant mets. \par \par The left breast has atypical lobular hyperplasia. \par \par Assessment and Plan:\par -- S/P AC x 4 and Taxol x 14 completed on 4/14/22. \par -- TTE on 9/30/21 showed normal LV Ejection Fraction of 66 %. Echo also reported moderately enlarged right ventricle, mild-moderate tricuspid regurgitation, mild to moderate pulmonic valve regurgitation. Discussed with Dr. Robert, cardiology. He compared patient's current and previous Echo. There is only mild enlarged right ventricle. She saw Dr. Robert and had repeat 2D Echo. \par -- MRI of the breast on 5/16/22 showed no evidence of malignancy.\par -- Follow up with Dr Bagley, completed for adjuvant PMRT 8/31/22. \par -- Discussed adjuvant endocrine therapy. She started letrozole in May stopped in June due to severe arthralgia. She agreed to try another AI. Will switch her to exemestane. A script was sent to her pharmacy. \par -- In light of 8 positive lymph nodes and KI 67 20-25%, she will be benefit from addition of CDK 4/6 inhibitor Abemaciclib along with adjuvant endocrine therapy. She was given the copy of information for review during previous visit. She decided to take Abemaciclib in addition to exemestane. She will take Abemaciclib 150 mg twice a day. Duration of Abemaciclib is for 2 years if she can tolerate. Prescriptions sent to pharmacy. The side effects of Abemaciclib were reviewed which may include but not limit to BM suppression, cytopenia, increased risk of infection, N/V/D, abdominal pain, hair thinning, abnormal liver function, skin rash. \par -- Bone density 5/22, showed osteopenia T score -1.8. Continue calcium and vitamin D supplement. Encourage regular exercise. \par -- Follow up with PMD and cardiology regarding HTN meds. \par -- Labs today cbc, bmp, left, cea, ca 15-3, TSH, FT4. \par -- RTO for f/u in 1 month. She will call if there is any new concern.\par \par Case was seen and discussed with Dr. Tejada who agreed with assessment and plan.\par  \par \par \par \par

## 2022-10-16 NOTE — PHYSICAL EXAM
[Restricted in physically strenuous activity but ambulatory and able to carry out work of a light or sedentary nature] : Status 1- Restricted in physically strenuous activity but ambulatory and able to carry out work of a light or sedentary nature, e.g., light house work, office work [Normal] : affect appropriate [de-identified] : S/P b/l mastectomy and implant reconstruction. Surgical incisions are healing well. There is no palpable abnormality.  [de-identified] : port-a-cath placed to left chest wall, intact, no erythema

## 2022-10-16 NOTE — CONSULT LETTER
[Dear  ___] : Dear  [unfilled], [Courtesy Letter:] : I had the pleasure of seeing your patient, [unfilled], in my office today. [Please see my note below.] : Please see my note below. [Sincerely,] : Sincerely, [DrPatrick  ___] : Dr. EAST [DrPatrick ___] : Dr. EAST [FreeTextEntry3] : Keyla Tejdaa MD

## 2022-10-31 ENCOUNTER — APPOINTMENT (OUTPATIENT)
Dept: PLASTIC SURGERY | Facility: CLINIC | Age: 72
End: 2022-10-31

## 2022-10-31 DIAGNOSIS — I89.0 LYMPHEDEMA, NOT ELSEWHERE CLASSIFIED: ICD-10-CM

## 2022-10-31 DIAGNOSIS — M75.02 ADHESIVE CAPSULITIS OF LEFT SHOULDER: ICD-10-CM

## 2022-10-31 DIAGNOSIS — M75.01 ADHESIVE CAPSULITIS OF RIGHT SHOULDER: ICD-10-CM

## 2022-10-31 PROCEDURE — 99214 OFFICE O/P EST MOD 30 MIN: CPT

## 2022-10-31 RX ORDER — MONTELUKAST 10 MG/1
10 TABLET, FILM COATED ORAL
Qty: 90 | Refills: 0 | Status: ACTIVE | COMMUNITY
Start: 2022-10-31 | End: 1900-01-01

## 2022-10-31 NOTE — ASSESSMENT
[FreeTextEntry1] : 72 y/o F with newly diagnosed right breast IDC/DCIS and +axillary LN who has elected to undergo bilateral mastectomy\par \par 13 months s/p bilateral MRM and SLNB with immediate direct to implant reconstruction: left prepectoral, right subpectoral.  Excellent breast reconstruction appearance.  \par \par Pt requesting BL breast implant downsize\par BIlateral frozen shoulder\par Right chest wall and RUE lymphedema\par \par -Pt not yet a suitable candidate for implant exchanged with recent completion of right chest XRT\par -Recommend OT for BL frozen shoulder and RUE/chest wall LE\par -Recommend singulair for XRT-related capsular contracture.  B/R/A reviewed. Side effects reviewed.  recent LFT wnl\par -All questions were answered to patient's apparent satisfaction\par -follow up in 3 months for interval check, photos\par \par Due to COVID-19, pre-visit patient instructions were explained to the patient and their symptoms were checked upon arrival. Masks were used by the healthcare provider and staff and the examination room was cleaned after the patient visit concluded\par

## 2022-10-31 NOTE — PHYSICAL EXAM
[de-identified] : well-appearing, NAD [de-identified] : nonlabored breathing [de-identified] : ENEDELIAR [de-identified] : right lateral chest wall lymphedema [de-identified] : Bilateral breasts implants soft and mobile, left appears slightly larger than right due to implant position (left prepectoral, right subpectoral), incisions well-healed. excellent right pec muscle expansion now with XRt changed and overall soft tissue fibrosis and grade 3 capsular contracture with lymphedema. [de-identified] : Limited BL shoulder elevation and abduction\par RUE lymphedema grade 1/2

## 2022-10-31 NOTE — HISTORY OF PRESENT ILLNESS
[FreeTextEntry1] : Pt is a 72 y/o post menopausal F, , with PMH of scoliosis, HTN, Cervical CA s/p hysterectomy, and newly diagnosed RIGHT breast cancer who presents for breast reconstruction consultation. Pt states she self-palpated a lesion in February and underwent mammogram/US followed by biopsy confirming IDC and DCIS as well as right axillary +LN. Had not had a mammogram since age 32. Denies nipple bleeding, discharge, or inversion. She has decided to pursue bilateral mastectomy.   Dr. Ferguson recommend [facility].\par \par Per Dr. Tejada, will need either adjuvant or neoadjuvant chemotherapy. Pt has elected to do adjuvant chemotherapy.\par Pt states she would like to least amount of surgery possible for her reconstruction.\par \par H/o right lumpectomy x2 : benign nodules\par +family h/o breast cancer: paternal aunt, dx in her 40s\par \par Ht 5' Wt 128 lb  \par Current bra size: 34B, happy at this size\par Denies h/o VTE or MRSA infections\par Occ: Works at [employer]\par Here today with her daughter Sierra\par \par Interval hx (10/11/21): Pt presents today POD #12 s/p bilateral MRM and SLNB with immediate direct to implant reconstruction: left prepectoral, right subpectoral. Pt went to ED after discharge for increased left breast drain output and LLE swelling. Duplex negative. Now feeling better. Drain output: 1 (R IMF): /, 2 (R SL): , 3 (L SL): 10/7/7, 4 (L IMF): 10/5/5\par \par Interval hx (10/15/21):  Pt presents today POD #16 s/p bilateral MRM and SLNB with immediate direct to implant reconstruction: left prepectoral, right subpectoral. Taking PO abx as prescribed. C/o warmth to BL breasts but denies f/c or worsening pain or redness. Drain output: R- /5, L- 5//75\par \par Interval hx (10/25/21):  Here POD#26 s/p R MRM and SLNB with immediate direct to implant reconstruction; left pre-pectoral and right subpectoral.   left 15/10/8.  Denies fevers, chills, redness\par \par Interval hx (21):  Patient presents today POD#34 s/p B/L MRM and SLNB with immediate direct to implant reconstruction; left pre-pectoral and right subpectoral. Pt is doing better and reporting improvement in breast discomfort and swelling. Taking Gabapentin and Tylenol. Denies any f/c or drainage from incisions. \par \par Interval hx (11/15/21): 7 weeks s/p BL MRM and SLNB with immediate direct to implant reconstruction; left pre-pectoral and right subpectoral.  pt unhappy with asymmetry.  pt mostly unhappy 2/2 burning pain on left breast and stabbing pain at right IMF.   Pt to undergo chemotherapy and right breast XRT.\par \par Interval hx (3/14/22): 6 months s/p BL MRM and SLNB with immediate direct to implant reconstruction; left pre-pectoral and right subpectoral. Completed AC x4 and is now on weekly Taxol which she is tolerating much better. Would like to downsize implants to a "B" cup; also requesting left breast implant be placed retropectorally for symmetry. Concerned for left implant rupture due to change in appearance.\par \par Interval hx (22) 7 months s/p  MRM and SLNB with immediate direct to implant reconstruction; left pre-pectoral and right subpectoral.  Completed last cycle of chemotherapy last week.   She is start XRT after right breast re-excision with  Dr. Ferguson. She c/o persistent BL breast pain that she attributes from breast implant reconstruction.  Denies redness, swelling.  She is fearful of post-XRT related pain with the implants in place.   Pt has decided that she wishes to have BOTH her implants removed first, then undergo XRT.  She will consider her post-XRT breast reconstruction, if at all desires.\par \par Interval hx (10/31/22):  13 months s/p Right chest wall and supraclavicular XRT, completed 22.  On exomestane and Verzenio per [facility].  c/o right chest wall pain and left shoulder pain.  Daughter present Diana.  Patient has changed her mind and would like BL breast implant downsize.

## 2022-11-03 ENCOUNTER — APPOINTMENT (OUTPATIENT)
Dept: HEMATOLOGY ONCOLOGY | Facility: CLINIC | Age: 72
End: 2022-11-03

## 2022-11-07 ENCOUNTER — OUTPATIENT (OUTPATIENT)
Dept: OUTPATIENT SERVICES | Facility: HOSPITAL | Age: 72
LOS: 1 days | Discharge: HOME | End: 2022-11-07

## 2022-11-07 ENCOUNTER — APPOINTMENT (OUTPATIENT)
Dept: HEMATOLOGY ONCOLOGY | Facility: CLINIC | Age: 72
End: 2022-11-07
Payer: MEDICARE

## 2022-11-07 VITALS
SYSTOLIC BLOOD PRESSURE: 124 MMHG | TEMPERATURE: 98.6 F | RESPIRATION RATE: 18 BRPM | DIASTOLIC BLOOD PRESSURE: 83 MMHG | OXYGEN SATURATION: 99 % | HEIGHT: 60 IN | BODY MASS INDEX: 27.29 KG/M2 | WEIGHT: 139 LBS | HEART RATE: 74 BPM

## 2022-11-07 DIAGNOSIS — Z51.81 ENCOUNTER FOR THERAPEUTIC DRUG LVL MONITORING: ICD-10-CM

## 2022-11-07 DIAGNOSIS — Z90.13 ACQUIRED ABSENCE OF BILATERAL BREASTS AND NIPPLES: Chronic | ICD-10-CM

## 2022-11-07 DIAGNOSIS — Z79.811 LONG TERM (CURRENT) USE OF AROMATASE INHIBITORS: ICD-10-CM

## 2022-11-07 DIAGNOSIS — Z87.898 PERSONAL HISTORY OF OTHER SPECIFIED CONDITIONS: ICD-10-CM

## 2022-11-07 DIAGNOSIS — Z90.49 ACQUIRED ABSENCE OF OTHER SPECIFIED PARTS OF DIGESTIVE TRACT: Chronic | ICD-10-CM

## 2022-11-07 DIAGNOSIS — R19.7 DIARRHEA, UNSPECIFIED: ICD-10-CM

## 2022-11-07 DIAGNOSIS — Z90.710 ACQUIRED ABSENCE OF BOTH CERVIX AND UTERUS: Chronic | ICD-10-CM

## 2022-11-07 DIAGNOSIS — C50.411 MALIGNANT NEOPLASM OF UPPER-OUTER QUADRANT OF RIGHT FEMALE BREAST: ICD-10-CM

## 2022-11-07 DIAGNOSIS — M85.80 OTHER SPECIFIED DISORDERS OF BONE DENSITY AND STRUCTURE, UNSPECIFIED SITE: ICD-10-CM

## 2022-11-07 DIAGNOSIS — Z95.828 PRESENCE OF OTHER VASCULAR IMPLANTS AND GRAFTS: Chronic | ICD-10-CM

## 2022-11-07 PROCEDURE — 99214 OFFICE O/P EST MOD 30 MIN: CPT

## 2022-11-07 NOTE — ASSESSMENT
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 8/12 right axillary lymph nodes are positive for metastatic carcinoma with extensive extracapsular extension.\par Oncotype RS is 27. \par Staging PET/CT on 8/16/21 was negative for distant mets. \par S/P AC x 4 and Taxol x 14 completed on 4/14/22. \par S/P adjuvant PMRT S/P \par The left breast has atypical lobular hyperplasia. \par \par Assessment and Plan:\par -- Continue Exemestane daily. Reviewed AI related side effects. She agrees to stay on treatment.\par -- In light of 8 positive lymph nodes and KI 67 20-25%, she will be benefit from addition of CDK 4/6 inhibitor Abemaciclib along with adjuvant endocrine therapy. She has been on Abemaciclib 150 mg q12h fro 3 weeks, developed frequent diarrhea. Will decrease to 150 mg daily. \par -- Order blood work for CBC, BMP, LFT, Mg,  and CEA. \par -- Advised to take Imodium as needed for diarrhea. \par -- TTE on 9/30/21 showed normal LV Ejection Fraction of 66 %. Echo also reported moderately enlarged right ventricle, mild-moderate tricuspid regurgitation, mild to moderate pulmonic valve regurgitation. Discussed with Dr. Robert, cardiology. He compared patient's current and previous Echo. There is only mild enlarged right ventricle. She saw Dr. Robert and had repeat 2D Echo. \par -- MRI of the breast on 5/16/22 showed no evidence of malignancy.\par -- Follow up with Dr Bagley, completed for adjuvant PMRT 8/31/22. \par -- Bone density 5/22, showed osteopenia T score -1.8. Continue calcium and vitamin D supplement. Encourage regular exercise. \par -- Follow up with PMD and cardiology regarding HTN meds. \par -- RTO for f/u in 1 month. She will call if there is any new concern.\par \par \par  \par \par \par \par

## 2022-11-07 NOTE — PHYSICAL EXAM
[Restricted in physically strenuous activity but ambulatory and able to carry out work of a light or sedentary nature] : Status 1- Restricted in physically strenuous activity but ambulatory and able to carry out work of a light or sedentary nature, e.g., light house work, office work [Normal] : affect appropriate [de-identified] : S/P b/l mastectomy and implant reconstruction. Surgical incisions are healing well. There is no palpable abnormality.  [de-identified] : port-a-cath placed to left chest wall, intact, no erythema

## 2022-11-07 NOTE — HISTORY OF PRESENT ILLNESS
[de-identified] : JOSE FRANCISCO THOMAS is a 71 year old female PMHx of cervical cancer s/p hysterectomy and unilateral salpingo-oophorectomy,  HTN presents for newly diagnosed invasive breast cancer. \par \par Patient first palpated a mass in the RUOQ herself within the past year. She had pain at the site and feels that it is growing. She went for b/l diagnostic mammo and sonogram on 6/17/2021. Her left breast had no suspicious findings however on her right breast a spiculated mass in the RUOQ as well as a second mass superior to with a thickened axillary 2.2 cm axillary node was seen. \par -breasts are heterogeneously dense\par -R: UOQ, spiculated mass, with calcifications, c/w US finding below \par -R: Medial to this mass in the posterior depth, an area of architectural distortion. \par -R: Slightly superior to this mass, there is a an additional circumscribed mass also seen on concurrent ultrasound.\par Right breast:\par - In the axilla, at the 10 to 11:00 position 12 cm from the nipple, there is a cortically thickened axillary lymph node measuring 2.2 cm --> BIOPSY \par -Corresponding to the area palpable concern at the 10 to 11:00 position 5 to 8 cm from the nipple, there is a spiculated mass with associated coarse calcifications measuring 2.7 x 1.2 x 1.8 cm --> BIOPSY \par -At the 11:00 position 10 cm from the nipple, there is a circumscribed hypoechoic mass measuring 1.3 x 1.2 x 0.4 cm, corresponding to mammographic findings --> BIOPSY \par -At the 6:00 position 5 cm from the nipple, there is a circumscribed hypoechoic mass measuring 0.3 x 0.3 x 0.2 cm.\par -At the 6:00 position 4 cm from the nipple, there is a circumscribed hypoechoic mass measuring 0.6 x 0.6 x 0.2 cm --> BIOPSY \par BI-RADS Category 5: Highly Suggestive of Malignancy\par \par On 7/21/2021, she went for US guided biopsy of  the 2cm mass which showed invasive moderately differential ductal carcinoma, with dominant micrpapillary features\par 1. Breast, right 10-11 N5-8 mass, ultrasound guided needle core\par biopsies:\par - Invasive moderately differentiated ductal carcinoma with dominant micropapillary features, ER 99%, PA 15%. Ki67 20%, Her 2 negative (IHC 1+, Her2/CEP17 1.9)\par - Ductal carcinoma in-situ (DCIS), cribriform type with comedo\par necrosis and microcalcifications, intermediate nuclear grade.\par - Foci of lymphovascular invasion are present.\par \par 2. Breast, right 11 N10 mass, ultrasound guided needle core biopsies:\par - Benign atrophic fatty breast tissue with a dominant macrocyst wall fragment demonstrating an attenuated epithelial lining; consistent with a dilated duct.\par \par 3. Breast, right 6 N4 mass, ultrasound guided needle core biopsies:\par - Hyalinized fibroadenoma.\par \par 4. Breast, right axillary mass, ultrasound guided needle core biopsies:\par - Lymph node fragments containing metastatic carcinoma (1/1), the largest contiguous focus of which measures 6.0 mm (microscopic measurement).\par - No extracapsular extension is identified in this biopsy material.\par \par On 8/16/21, she had a PET/CT which showed pathologic FDG uptake (SUV 7) coregistering with 3.6 x 2 cm right breast mass and pathologic FDG uptake in 2 cm right axillary nodule (SUV 6.1) consistent with documented biologic tumor activity. No other sites of abnormal FDG uptake\par \par On 8/18/21, she had b/l breast MRI MRI which showed the biopsy proven right breast carcinoma 1.9 x 3.7 x 3.5 cm and 2 cm right axillary adenopathy. No additional suspicious enhancement in either breast. \par \par She saw Dr. Ferguson for surgical consultation. She was given options to have neoadjuvant chemo followed by surgery or immediate surgery. \par \par Her family history is significant for breast cancer in her paternal aunt in her 40s,  2 uncles who had "brain cancer"; and her mom who had cervical cancer. \par  [de-identified] : 10/27/21:\dharmesh Galicia is here today for followup visit. She was previously seen on 8/20/21. She was diagnosed with  IDC of the right breast with 3 cm mass and positive right axillary lymph node, ER/AR positive, Her-2 negative. Clinical stage is T3N1 with Oncotype RS 27. She was given an option to have neoadjuvant chemotherapy prior to surgery but she opted to have surgery first. On 9/29/21, she underwent right breast modified radical skin-sparing mastectomy, right ALND, left breast simple skin-sparing mastectomy and left axillary SLNB with immediate direct to implant reconstruction. \par - The right mastectomy revealed 3.7 cm invasive poorly differentiated micropapillary carcinoma, ER 85%, AR 15%, Her-2 negative (FISH ration 1.8), Ki 67 20-25% and 0.8 cm invasive well differentiated tubulo-lobular variant of ductal carcinoma, %, AR 90%, Her-2 negative and Ki 67 3-5%,  Non-extensive ductal carcinoma in-situ (DCIS), cribriform and micropapillary types with comedo necrosis and microcalcifications, intermediate to focally high nuclear grade. Numerous foci of lymphovascular invasion are present. The invasive tumor extends to focally involve (touches both ink and cautery) the blue inked surface. The nipple, skin, and deep fascial margin are negative for carcinoma. Right ALND reveal metastatic carcinoma present in eight out of twelve axillary lymph nodes (8/12) with extensive extracapsular extension seen. The largest node contains a metallic "ring-shaped" biopsy clip and shows focal prior biopsy site changes.\par AJCC 8th Edition Pathologic Stage, m(2)pT2, pN2a, pMx.\par - The left breast simple skin-sparing mastectomy show atrophic predominantly fatty breast tissue with focal atypical lobular hyperplasia (ALH). 2 sentinel lymph noses are negative for malignancy.  \par She recovered well from surgery and is here today to discuss adjuvant systemic therapy. \par \par 12/2/21\dharmesh Galicia is here today for followup visit and chemotherapy. 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 8/12 right axillary lymph nodes are positive for metastatic carcinoma with extensive extracapsular extension. Oncotype RS is 27. She is indicated for adjuvant chemotherapy.  She had port a cath placed on 11/18 to left chest wall and felt pain at port site with movement of left upper extremity. She followed up with IR regarding concerns, no evidence of port infection, or blockage/clot within port catheter. She also had HOMA done on 9/30 LV EF normal. \par \par 12/16/21\dharmesh Galicia is here today for followup visit and chemotherapy. 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 8/12 right axillary lymph nodes are positive for metastatic carcinoma with extensive extracapsular extension. Oncotype RS is 27. She started adjuvant chemotherapy with dose-dense AC. To date she received 1 cycle of AC, tolerating well. She denies nausea, vomiting, diarrhea or breast pain. She c/o that her constipation is worse from her baseline, taking Senna twice daily.\par She also had HOMA done on 9/30/21 LV EF normal. \par \par 12/30/21\dharmesh Galicia is here today for followup visit and chemotherapy. 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 8/12 right axillary lymph nodes are positive for metastatic carcinoma with extensive extracapsular extension. She started adjuvant chemotherapy with dose-dense AC. To date she received 2 cycles of AC, tolerating well. She denies nausea, vomiting, diarrhea or breast pain. Her constipation has improved with senna 3 tablets and colace. She also had HOMA done on 9/30/21 LV EF normal. \par \par 1/13/22:jossie Galicia is here today for followup visit and chemotherapy. 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 8/12 right axillary lymph nodes are positive for metastatic carcinoma with extensive extracapsular extension. She has been on adjuvant chemotherapy with dose-dense AC. To date she received 3 cycles of AC, tolerating well. She saw Dr. Robert and had repeat 2D Echo. \par \par 1/27/22:jossie Galicia is here today for followup visit and chemotherapy. 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 8/12 right axillary lymph nodes are positive for metastatic carcinoma with extensive extracapsular extension. She has been on adjuvant chemotherapy. She finished dose-dense AC x 4. She reported white coating on oral mucosa. She was given Nystatin swishing and spiting. She is feeling better now. She saw Dr. Robert and had repeat 2D Echo. \par \par 2/24/22jossie Galicia is here today for followup visit and chemotherapy. 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 8/12 right axillary lymph nodes are positive for metastatic carcinoma with extensive extracapsular extension. She has been on adjuvant chemotherapy. She finished dose-dense AC x 4. She is currently on weekly Taxol. Today, she is scheduled for 5/12 Taxol.  She c/o bloatedness, hearing loss, anxiety, nervousness, feeling agitated.  She is not happy about gaining 5 lbs in 1 month.  Denies fever, N/V/C/D, paresthesias.\par \par 3/24/22:jossie Floresmary is here today for followup visit and chemotherapy. 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 8/12 right axillary lymph nodes are positive for metastatic carcinoma with extensive extracapsular extension. She has been on adjuvant chemotherapy. She finished dose-dense AC x 4. She is currently on weekly Taxol. Today, she is scheduled for 9/12 Taxol.  She reports chemotherapy-related nail changes. \par \par 4/14/22:jossie Galicia is here today for followup visit and chemotherapy. 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 8/12 right axillary lymph nodes are positive for metastatic carcinoma with extensive extracapsular extension. She has been on adjuvant chemotherapy. She finished dose-dense AC x 4. She is currently on weekly Taxol. Today, she is scheduled for last treatment (week 12 Taxol).  \par \par 5/12/2022jossie Galicia is here today for followup visit. 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 8/12 right axillary lymph nodes are positive for metastatic carcinoma with extensive extracapsular extension. She completed adjuvant chemotherapy with dose-dense AC x 4 followed by weekly Taxol x 12. She followed up with Dr Ferguson and is planned for port removal on 5/23/22 and MRI breast on 5/16/22. She has not started Letrozole but has meds available at home. She is due for baseline DEXA scan.\par \par 7/22/22\dharmesh Galicia is here today for followup visit. 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 8/12 right axillary lymph nodes are positive for metastatic carcinoma with extensive extracapsular extension. She completed adjuvant chemotherapy with dose-dense AC x 4 followed by weekly Taxol x 12. She had port removed on with Dr Ferguson.  She started adjuvant PMRT on 6/14. She started letrozole in May, experienced increasing arthralgia and hot flashes. She stopped taking letrozole 2 weeks ago. She also stopped metoprolol and candesartan, for low BP at home, according to patient it was 100/60s as per patient. She has not followed up with PMD or cardiologist. She had DEXA on 5/16/22 which showed osteopenia T score -1.2. She had bilateral breast MRI on 5/16/21 which showed no evidence of malignancy. \par \par 10/6/22jossie Frida is here today for followup visit. 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 8/12 right axillary lymph nodes are positive for metastatic carcinoma with extensive extracapsular extension. She completed adjuvant chemotherapy with dose-dense AC x 4 followed by weekly Taxol x 12. She completed adjuvant PMRT from 6/30/2022 through 8/31/2022. She started letrozole in May, experienced increasing arthralgia and hot flashes, therefore stopped in July. She had DEXA on 5/16/22 which showed osteopenia T score -1.2. She had bilateral breast MRI on 5/16/21 which showed no evidence of malignancy. She feels well and does not have new complains.\par \par 11/7/22:\dharmesh Galicia is here today for followup visit. 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 8/12 right axillary lymph nodes are positive for metastatic carcinoma with extensive extracapsular extension. She completed adjuvant chemotherapy with dose-dense AC x 4 followed by weekly Taxol x 12. She completed adjuvant PMRT from 6/30/2022 through 8/31/2022. She started letrozole in May, experienced increasing arthralgia and hot flashes. She stopped in July. Since her previous visit in 10/2022, she started on Exemestane and tolerated better. But she complains weight gain. She also started on Abemaciclib 150 mg q12h. To date, she has been on treatment for 3 weeks. She reports frequent diarrhea > 3 times per day and cramps. She does not have n/v or shin rash.

## 2022-11-08 DIAGNOSIS — R19.7 DIARRHEA, UNSPECIFIED: ICD-10-CM

## 2022-11-08 DIAGNOSIS — Z51.81 ENCOUNTER FOR THERAPEUTIC DRUG LEVEL MONITORING: ICD-10-CM

## 2022-11-14 ENCOUNTER — OUTPATIENT (OUTPATIENT)
Dept: OUTPATIENT SERVICES | Facility: HOSPITAL | Age: 72
LOS: 1 days | Discharge: HOME | End: 2022-11-14

## 2022-11-14 DIAGNOSIS — Z90.49 ACQUIRED ABSENCE OF OTHER SPECIFIED PARTS OF DIGESTIVE TRACT: Chronic | ICD-10-CM

## 2022-11-14 DIAGNOSIS — Z95.828 PRESENCE OF OTHER VASCULAR IMPLANTS AND GRAFTS: Chronic | ICD-10-CM

## 2022-11-14 DIAGNOSIS — M25.611 STIFFNESS OF RIGHT SHOULDER, NOT ELSEWHERE CLASSIFIED: ICD-10-CM

## 2022-11-14 DIAGNOSIS — Z90.13 ACQUIRED ABSENCE OF BILATERAL BREASTS AND NIPPLES: Chronic | ICD-10-CM

## 2022-11-14 DIAGNOSIS — Z90.710 ACQUIRED ABSENCE OF BOTH CERVIX AND UTERUS: Chronic | ICD-10-CM

## 2022-11-30 LAB
ALBUMIN SERPL ELPH-MCNC: 4.2 G/DL
ALP BLD-CCNC: 70 U/L
ALT SERPL-CCNC: 8 U/L
ANION GAP SERPL CALC-SCNC: 11 MMOL/L
AST SERPL-CCNC: 15 U/L
BASOPHILS # BLD AUTO: 0.03 K/UL
BASOPHILS NFR BLD AUTO: 0.8 %
BILIRUB DIRECT SERPL-MCNC: <0.2 MG/DL
BILIRUB INDIRECT SERPL-MCNC: >0.2 MG/DL
BILIRUB SERPL-MCNC: 0.4 MG/DL
BUN SERPL-MCNC: 14 MG/DL
CALCIUM SERPL-MCNC: 9.4 MG/DL
CANCER AG15-3 SERPL-ACNC: 27.8 U/ML
CEA SERPL-MCNC: 2.2 NG/ML
CHLORIDE SERPL-SCNC: 103 MMOL/L
CO2 SERPL-SCNC: 25 MMOL/L
CREAT SERPL-MCNC: 0.9 MG/DL
EGFR: 69 ML/MIN/1.73M2
EOSINOPHIL # BLD AUTO: 0.1 K/UL
EOSINOPHIL NFR BLD AUTO: 2.6 %
GLUCOSE SERPL-MCNC: 90 MG/DL
HCT VFR BLD CALC: 38.1 %
HGB BLD-MCNC: 12.6 G/DL
IMM GRANULOCYTES NFR BLD AUTO: 0.3 %
LYMPHOCYTES # BLD AUTO: 0.93 K/UL
LYMPHOCYTES NFR BLD AUTO: 23.9 %
MAGNESIUM SERPL-MCNC: 1.8 MG/DL
MAN DIFF?: NORMAL
MCHC RBC-ENTMCNC: 29.7 PG
MCHC RBC-ENTMCNC: 33.1 G/DL
MCV RBC AUTO: 89.9 FL
MONOCYTES # BLD AUTO: 0.45 K/UL
MONOCYTES NFR BLD AUTO: 11.6 %
NEUTROPHILS # BLD AUTO: 2.37 K/UL
NEUTROPHILS NFR BLD AUTO: 60.8 %
PLATELET # BLD AUTO: 169 K/UL
POTASSIUM SERPL-SCNC: 4 MMOL/L
PROT SERPL-MCNC: 6.7 G/DL
RBC # BLD: 4.24 M/UL
RBC # FLD: 12.9 %
SODIUM SERPL-SCNC: 139 MMOL/L
WBC # FLD AUTO: 3.89 K/UL

## 2022-12-08 ENCOUNTER — APPOINTMENT (OUTPATIENT)
Dept: HEMATOLOGY ONCOLOGY | Facility: CLINIC | Age: 72
End: 2022-12-08

## 2022-12-08 VITALS
HEART RATE: 72 BPM | WEIGHT: 142 LBS | TEMPERATURE: 97.2 F | SYSTOLIC BLOOD PRESSURE: 142 MMHG | RESPIRATION RATE: 16 BRPM | DIASTOLIC BLOOD PRESSURE: 85 MMHG | HEIGHT: 61 IN | BODY MASS INDEX: 26.81 KG/M2

## 2022-12-08 DIAGNOSIS — M85.80 OTHER SPECIFIED DISORDERS OF BONE DENSITY AND STRUCTURE, UNSPECIFIED SITE: ICD-10-CM

## 2022-12-08 PROCEDURE — 99214 OFFICE O/P EST MOD 30 MIN: CPT

## 2022-12-11 PROBLEM — M85.80 OSTEOPENIA: Status: ACTIVE | Noted: 2022-07-22

## 2022-12-11 NOTE — PHYSICAL EXAM
Detail Level: Generalized Quality 130: Documentation Of Current Medications In The Medical Record: Current Medications Documented Additional Notes: No meds taken [Restricted in physically strenuous activity but ambulatory and able to carry out work of a light or sedentary nature] : Status 1- Restricted in physically strenuous activity but ambulatory and able to carry out work of a light or sedentary nature, e.g., light house work, office work [Normal] : affect appropriate [de-identified] : S/P b/l mastectomy and implant reconstruction. Surgical incisions are healing well. There is no palpable abnormality.  [de-identified] : port-a-cath placed to left chest wall, intact, no erythema

## 2022-12-11 NOTE — HISTORY OF PRESENT ILLNESS
[de-identified] : JOSE FRANCISCO THOMAS is a 71 year old female PMHx of cervical cancer s/p hysterectomy and unilateral salpingo-oophorectomy,  HTN presents for newly diagnosed invasive breast cancer. \par \par Patient first palpated a mass in the RUOQ herself within the past year. She had pain at the site and feels that it is growing. She went for b/l diagnostic mammo and sonogram on 6/17/2021. Her left breast had no suspicious findings however on her right breast a spiculated mass in the RUOQ as well as a second mass superior to with a thickened axillary 2.2 cm axillary node was seen. \par -breasts are heterogeneously dense\par -R: UOQ, spiculated mass, with calcifications, c/w US finding below \par -R: Medial to this mass in the posterior depth, an area of architectural distortion. \par -R: Slightly superior to this mass, there is a an additional circumscribed mass also seen on concurrent ultrasound.\par Right breast:\par - In the axilla, at the 10 to 11:00 position 12 cm from the nipple, there is a cortically thickened axillary lymph node measuring 2.2 cm --> BIOPSY \par -Corresponding to the area palpable concern at the 10 to 11:00 position 5 to 8 cm from the nipple, there is a spiculated mass with associated coarse calcifications measuring 2.7 x 1.2 x 1.8 cm --> BIOPSY \par -At the 11:00 position 10 cm from the nipple, there is a circumscribed hypoechoic mass measuring 1.3 x 1.2 x 0.4 cm, corresponding to mammographic findings --> BIOPSY \par -At the 6:00 position 5 cm from the nipple, there is a circumscribed hypoechoic mass measuring 0.3 x 0.3 x 0.2 cm.\par -At the 6:00 position 4 cm from the nipple, there is a circumscribed hypoechoic mass measuring 0.6 x 0.6 x 0.2 cm --> BIOPSY \par BI-RADS Category 5: Highly Suggestive of Malignancy\par \par On 7/21/2021, she went for US guided biopsy of  the 2cm mass which showed invasive moderately differential ductal carcinoma, with dominant micrpapillary features\par 1. Breast, right 10-11 N5-8 mass, ultrasound guided needle core\par biopsies:\par - Invasive moderately differentiated ductal carcinoma with dominant micropapillary features, ER 99%, ID 15%. Ki67 20%, Her 2 negative (IHC 1+, Her2/CEP17 1.9)\par - Ductal carcinoma in-situ (DCIS), cribriform type with comedo\par necrosis and microcalcifications, intermediate nuclear grade.\par - Foci of lymphovascular invasion are present.\par \par 2. Breast, right 11 N10 mass, ultrasound guided needle core biopsies:\par - Benign atrophic fatty breast tissue with a dominant macrocyst wall fragment demonstrating an attenuated epithelial lining; consistent with a dilated duct.\par \par 3. Breast, right 6 N4 mass, ultrasound guided needle core biopsies:\par - Hyalinized fibroadenoma.\par \par 4. Breast, right axillary mass, ultrasound guided needle core biopsies:\par - Lymph node fragments containing metastatic carcinoma (1/1), the largest contiguous focus of which measures 6.0 mm (microscopic measurement).\par - No extracapsular extension is identified in this biopsy material.\par \par On 8/16/21, she had a PET/CT which showed pathologic FDG uptake (SUV 7) coregistering with 3.6 x 2 cm right breast mass and pathologic FDG uptake in 2 cm right axillary nodule (SUV 6.1) consistent with documented biologic tumor activity. No other sites of abnormal FDG uptake\par \par On 8/18/21, she had b/l breast MRI MRI which showed the biopsy proven right breast carcinoma 1.9 x 3.7 x 3.5 cm and 2 cm right axillary adenopathy. No additional suspicious enhancement in either breast. \par \par She saw Dr. Ferguson for surgical consultation. She was given options to have neoadjuvant chemo followed by surgery or immediate surgery. \par \par Her family history is significant for breast cancer in her paternal aunt in her 40s,  2 uncles who had "brain cancer"; and her mom who had cervical cancer. \par  [de-identified] : 10/27/21:\dharmesh Galicia is here today for followup visit. She was previously seen on 8/20/21. She was diagnosed with  IDC of the right breast with 3 cm mass and positive right axillary lymph node, ER/ME positive, Her-2 negative. Clinical stage is T3N1 with Oncotype RS 27. She was given an option to have neoadjuvant chemotherapy prior to surgery but she opted to have surgery first. On 9/29/21, she underwent right breast modified radical skin-sparing mastectomy, right ALND, left breast simple skin-sparing mastectomy and left axillary SLNB with immediate direct to implant reconstruction. \par - The right mastectomy revealed 3.7 cm invasive poorly differentiated micropapillary carcinoma, ER 85%, ME 15%, Her-2 negative (FISH ration 1.8), Ki 67 20-25% and 0.8 cm invasive well differentiated tubulo-lobular variant of ductal carcinoma, %, ME 90%, Her-2 negative and Ki 67 3-5%,  Non-extensive ductal carcinoma in-situ (DCIS), cribriform and micropapillary types with comedo necrosis and microcalcifications, intermediate to focally high nuclear grade. Numerous foci of lymphovascular invasion are present. The invasive tumor extends to focally involve (touches both ink and cautery) the blue inked surface. The nipple, skin, and deep fascial margin are negative for carcinoma. Right ALND reveal metastatic carcinoma present in eight out of twelve axillary lymph nodes (8/12) with extensive extracapsular extension seen. The largest node contains a metallic "ring-shaped" biopsy clip and shows focal prior biopsy site changes.\par AJCC 8th Edition Pathologic Stage, m(2)pT2, pN2a, pMx.\par - The left breast simple skin-sparing mastectomy show atrophic predominantly fatty breast tissue with focal atypical lobular hyperplasia (ALH). 2 sentinel lymph noses are negative for malignancy.  \par She recovered well from surgery and is here today to discuss adjuvant systemic therapy. \par \par 12/2/21\dharmesh Galicia is here today for followup visit and chemotherapy. She has stage IIIA ([id number]) right breast multifocal invasive poorly differentiated micropapillary carcinoma, 3.7 cm and invasive well differentiated tubulo-lobular variant of ductal carcinoma, 0.8 cm, ER/ME positive, Her-2 negative, s/p right breast modified radical skin-sparing mastectomy, right ALND, left breast simple skin-sparing mastectomy and left axillary SLNB with immediate implant reconstruction. 8/12 right axillary lymph nodes are positive for metastatic carcinoma with extensive extracapsular extension. Oncotype RS is 27. She is indicated for adjuvant chemotherapy.  She had port a cath placed on 11/18 to left chest wall and felt pain at port site with movement of left upper extremity. She followed up with IR regarding concerns, no evidence of port infection, or blockage/clot within port catheter. She also had HOMA done on 9/30 LV EF normal. \par \par 12/16/21\dharmesh Galicia is here today for followup visit and chemotherapy. She has stage IIIA ([id number]) right breast multifocal invasive poorly differentiated micropapillary carcinoma, 3.7 cm and invasive well differentiated tubulo-lobular variant of ductal carcinoma, 0.8 cm, ER/ME positive, Her-2 negative, s/p right breast modified radical skin-sparing mastectomy, right ALND, left breast simple skin-sparing mastectomy and left axillary SLNB with immediate implant reconstruction. 8/12 right axillary lymph nodes are positive for metastatic carcinoma with extensive extracapsular extension. Oncotype RS is 27. She started adjuvant chemotherapy with dose-dense AC. To date she received 1 cycle of AC, tolerating well. She denies nausea, vomiting, diarrhea or breast pain. She c/o that her constipation is worse from her baseline, taking Senna twice daily.\par She also had HOMA done on 9/30/21 LV EF normal. \par \par 12/30/21\dharmesh Galicia is here today for followup visit and chemotherapy. She has stage IIIA ([id number]) right breast multifocal invasive poorly differentiated micropapillary carcinoma, 3.7 cm and invasive well differentiated tubulo-lobular variant of ductal carcinoma, 0.8 cm, ER/ME positive, Her-2 negative, s/p right breast modified radical skin-sparing mastectomy, right ALND, left breast simple skin-sparing mastectomy and left axillary SLNB with immediate implant reconstruction. 8/12 right axillary lymph nodes are positive for metastatic carcinoma with extensive extracapsular extension. She started adjuvant chemotherapy with dose-dense AC. To date she received 2 cycles of AC, tolerating well. She denies nausea, vomiting, diarrhea or breast pain. Her constipation has improved with senna 3 tablets and colace. She also had HOMA done on 9/30/21 LV EF normal. \par \par 1/13/22:jossie Galicia is here today for followup visit and chemotherapy. She has stage IIIA ([id number]) right breast multifocal invasive poorly differentiated micropapillary carcinoma, 3.7 cm and invasive well differentiated tubulo-lobular variant of ductal carcinoma, 0.8 cm, ER/ME positive, Her-2 negative, s/p right breast modified radical skin-sparing mastectomy, right ALND, left breast simple skin-sparing mastectomy and left axillary SLNB with immediate implant reconstruction. 8/12 right axillary lymph nodes are positive for metastatic carcinoma with extensive extracapsular extension. She has been on adjuvant chemotherapy with dose-dense AC. To date she received 3 cycles of AC, tolerating well. She saw Dr. Robert and had repeat 2D Echo. \par \par 1/27/22:jossie Galicia is here today for followup visit and chemotherapy. She has stage IIIA ([id number]) right breast multifocal invasive poorly differentiated micropapillary carcinoma, 3.7 cm and invasive well differentiated tubulo-lobular variant of ductal carcinoma, 0.8 cm, ER/ME positive, Her-2 negative, s/p right breast modified radical skin-sparing mastectomy, right ALND, left breast simple skin-sparing mastectomy and left axillary SLNB with immediate implant reconstruction. 8/12 right axillary lymph nodes are positive for metastatic carcinoma with extensive extracapsular extension. She has been on adjuvant chemotherapy. She finished dose-dense AC x 4. She reported white coating on oral mucosa. She was given Nystatin swishing and spiting. She is feeling better now. She saw Dr. Robert and had repeat 2D Echo. \par \par 2/24/22jossie Galicia is here today for followup visit and chemotherapy. She has stage IIIA ([id number]) right breast multifocal invasive poorly differentiated micropapillary carcinoma, 3.7 cm and invasive well differentiated tubulo-lobular variant of ductal carcinoma, 0.8 cm, ER/ME positive, Her-2 negative, s/p right breast modified radical skin-sparing mastectomy, right ALND, left breast simple skin-sparing mastectomy and left axillary SLNB with immediate implant reconstruction. 8/12 right axillary lymph nodes are positive for metastatic carcinoma with extensive extracapsular extension. She has been on adjuvant chemotherapy. She finished dose-dense AC x 4. She is currently on weekly Taxol. Today, she is scheduled for 5/12 Taxol.  She c/o bloatedness, hearing loss, anxiety, nervousness, feeling agitated.  She is not happy about gaining 5 lbs in 1 month.  Denies fever, N/V/C/D, paresthesias.\par \par 3/24/22:jossie Floresmary is here today for followup visit and chemotherapy. She has stage IIIA ([id number]) right breast multifocal invasive poorly differentiated micropapillary carcinoma, 3.7 cm and invasive well differentiated tubulo-lobular variant of ductal carcinoma, 0.8 cm, ER/ME positive, Her-2 negative, s/p right breast modified radical skin-sparing mastectomy, right ALND, left breast simple skin-sparing mastectomy and left axillary SLNB with immediate implant reconstruction. 8/12 right axillary lymph nodes are positive for metastatic carcinoma with extensive extracapsular extension. She has been on adjuvant chemotherapy. She finished dose-dense AC x 4. She is currently on weekly Taxol. Today, she is scheduled for 9/12 Taxol.  She reports chemotherapy-related nail changes. \par \par 4/14/22:jossie Galicia is here today for followup visit and chemotherapy. She has stage IIIA ([id number]) right breast multifocal invasive poorly differentiated micropapillary carcinoma, 3.7 cm and invasive well differentiated tubulo-lobular variant of ductal carcinoma, 0.8 cm, ER/ME positive, Her-2 negative, s/p right breast modified radical skin-sparing mastectomy, right ALND, left breast simple skin-sparing mastectomy and left axillary SLNB with immediate implant reconstruction. 8/12 right axillary lymph nodes are positive for metastatic carcinoma with extensive extracapsular extension. She has been on adjuvant chemotherapy. She finished dose-dense AC x 4. She is currently on weekly Taxol. Today, she is scheduled for last treatment (week 12 Taxol).  \par \par 5/12/2022jossie Galicia is here today for followup visit. She has stage IIIA ([id number]) right breast multifocal invasive poorly differentiated micropapillary carcinoma, 3.7 cm and invasive well differentiated tubulo-lobular variant of ductal carcinoma, 0.8 cm, ER/ME positive, Her-2 negative, s/p right breast modified radical skin-sparing mastectomy, right ALND, left breast simple skin-sparing mastectomy and left axillary SLNB with immediate implant reconstruction. 8/12 right axillary lymph nodes are positive for metastatic carcinoma with extensive extracapsular extension. She completed adjuvant chemotherapy with dose-dense AC x 4 followed by weekly Taxol x 12. She followed up with Dr Ferguson and is planned for port removal on 5/23/22 and MRI breast on 5/16/22. She has not started Letrozole but has meds available at home. She is due for baseline DEXA scan.\par \par 7/22/22\dharmesh Galicia is here today for followup visit. She has stage IIIA ([id number]) right breast multifocal invasive poorly differentiated micropapillary carcinoma, 3.7 cm and invasive well differentiated tubulo-lobular variant of ductal carcinoma, 0.8 cm, ER/ME positive, Her-2 negative, s/p right breast modified radical skin-sparing mastectomy, right ALND, left breast simple skin-sparing mastectomy and left axillary SLNB with immediate implant reconstruction. 8/12 right axillary lymph nodes are positive for metastatic carcinoma with extensive extracapsular extension. She completed adjuvant chemotherapy with dose-dense AC x 4 followed by weekly Taxol x 12. She had port removed on with Dr Ferguson.  She started adjuvant PMRT on 6/14. She started letrozole in May, experienced increasing arthralgia and hot flashes. She stopped taking letrozole 2 weeks ago. She also stopped metoprolol and candesartan, for low BP at home, according to patient it was 100/60s as per patient. She has not followed up with PMD or cardiologist. She had DEXA on 5/16/22 which showed osteopenia T score -1.2. She had bilateral breast MRI on 5/16/21 which showed no evidence of malignancy. \par \par 10/6/22jossie Frida is here today for followup visit. She has stage IIIA ([id number]) right breast multifocal invasive poorly differentiated micropapillary carcinoma, ER/ME positive, Her-2 negative, s/p right breast modified radical skin-sparing mastectomy, right ALND, left breast simple skin-sparing mastectomy and left axillary SLNB with immediate implant reconstruction. 8/12 right axillary lymph nodes are positive for metastatic carcinoma with extensive extracapsular extension. She completed adjuvant chemotherapy with dose-dense AC x 4 followed by weekly Taxol x 12. She completed adjuvant PMRT from 6/30/2022 through 8/31/2022. She started letrozole in May, experienced increasing arthralgia and hot flashes, therefore stopped in July. She had DEXA on 5/16/22 which showed osteopenia T score -1.2. She had bilateral breast MRI on 5/16/21 which showed no evidence of malignancy. She feels well and does not have new complains.\par \par 11/7/22:\dharmesh Galicia is here today for followup visit. She has stage IIIA ([id number]) right breast multifocal invasive poorly differentiated micropapillary carcinoma, ER/ME positive, Her-2 negative, s/p right breast modified radical skin-sparing mastectomy, right ALND, left breast simple skin-sparing mastectomy and left axillary SLNB with immediate implant reconstruction. 8/12 right axillary lymph nodes are positive for metastatic carcinoma with extensive extracapsular extension. She completed adjuvant chemotherapy with dose-dense AC x 4 followed by weekly Taxol x 12. She completed adjuvant PMRT from 6/30/2022 through 8/31/2022. She started letrozole in May, experienced increasing arthralgia and hot flashes. She stopped in July. Since her previous visit in 10/2022, she started on Exemestane and tolerated better. But she complains weight gain. She also started on Abemaciclib 150 mg q12h. To date, she has been on treatment for 3 weeks. She reports frequent diarrhea > 3 times per day and cramps. She does not have n/v or shin rash. \par \par 12/8/22jossie Galicia is here today for followup visit. She has stage IIIA ([id number]) right breast multifocal invasive poorly differentiated micropapillary carcinoma, ER/ME positive, Her-2 negative, s/p right breast modified radical skin-sparing mastectomy, right ALND, left breast simple skin-sparing mastectomy and left axillary SLNB with immediate implant reconstruction. 8/12 right axillary lymph nodes are positive for metastatic carcinoma with extensive extracapsular extension. She completed adjuvant chemotherapy with dose-dense AC x 4 followed by weekly Taxol x 12. She completed adjuvant PMRT from 6/30/2022 through 8/31/2022. She started letrozole in 5/2022, experienced increasing arthralgia and hot flashes. She stopped in July. Since her previous visit in 10/2022, she started on Exemestane. Pt is c/o of b/l hand pain and right leg pain. Pt will continue with the Exemestane daily. Pt states she has an increased appetite and weight gain. She also started on Abemaciclib 150 mg q12h. due to the diarrhea pt is taking abemacicib 150 daily. To date, she has been on treatment for 4 weeks.  Diarrhea decreased from 3 episodes daily to 2 episodes daily. Denies n/v.

## 2022-12-11 NOTE — ASSESSMENT
[FreeTextEntry1] : 73 yo female has stage IIIA ([id number]) right breast multifocal invasive poorly differentiated micropapillary carcinoma, 3.7 cm and invasive well differentiated tubulo-lobular variant of ductal carcinoma, 0.8 cm, ER/ME positive, Her-2 negative, s/p right breast modified radical skin-sparing mastectomy, right ALND, left breast simple skin-sparing mastectomy and left axillary SLNB with immediate direct to implant reconstruction. 8/12 right axillary lymph nodes are positive for metastatic carcinoma with extensive extracapsular extension.\par Oncotype RS is 27. \par Staging PET/CT on 8/16/21 was negative for distant mets. \par S/P AC x 4 and Taxol x 14 completed on 4/14/22. \par S/P adjuvant PMRT S/P \par The left breast has atypical lobular hyperplasia. \par \par Assessment and Plan:\par -- Continue Exemestane daily. Reviewed AI related side effects. She agrees to stay on treatment. Will continue to monitor AI side effects.\par -- In light of 8 positive lymph nodes and KI 67 20-25%, she will be benefit from addition of CDK 4/6 inhibitor Abemaciclib along with adjuvant endocrine therapy. She has been on Abemaciclib 150 mg q12h fro 4 weeks, developed frequent diarrhea. Will decrease to 100 mg q12h. New script was sent to he pharmacy.\par -- Advised to take Imodium as needed for diarrhea. \par -- Order blood work for CBC, BMP, LFT, Mg,  and CEA.  \par -- TTE on 9/30/21 showed normal LV Ejection Fraction of 66 %. Echo also reported moderately enlarged right ventricle, mild-moderate tricuspid regurgitation, mild to moderate pulmonic valve regurgitation. Discussed with Dr. Robert, cardiology. He compared patient's current and previous Echo. There is only mild enlarged right ventricle. She saw Dr. Robert and had repeat 2D Echo. \par -- Completed for adjuvant PMRT 8/31/22.  Follow up with Dr Bagley.\par -- Bone density 5/22, showed osteopenia T score -1.8. Advised pt to take calcium and vitamin D supplement. Encourage regular exercise. \par -- Follow up with PMD and cardiology regarding HTN meds. \par -- RTO for f/u in 1 month. She will call if there is any new concern.\par \par \par Case was seen and discussed with Dr. Tejada who agreed with the assessment and plan.\par \par \par \par

## 2022-12-11 NOTE — CONSULT LETTER
[Dear  ___] : Dear  [unfilled], [Courtesy Letter:] : I had the pleasure of seeing your patient, [unfilled], in my office today. [Please see my note below.] : Please see my note below. [Sincerely,] : Sincerely, [DrPatrick  ___] : Dr. EAST [DrPatrick ___] : Dr. EAST [FreeTextEntry3] : Keyla Tejada MD [___] : [unfilled]

## 2022-12-12 LAB
ALBUMIN SERPL ELPH-MCNC: 4.6 G/DL
ALP BLD-CCNC: 72 U/L
ALT SERPL-CCNC: 17 U/L
ANION GAP SERPL CALC-SCNC: 11 MMOL/L
AST SERPL-CCNC: 24 U/L
BASOPHILS # BLD AUTO: 0.04 K/UL
BASOPHILS NFR BLD AUTO: 0.5 %
BILIRUB DIRECT SERPL-MCNC: <0.2 MG/DL
BILIRUB INDIRECT SERPL-MCNC: >0.2 MG/DL
BILIRUB SERPL-MCNC: 0.4 MG/DL
BUN SERPL-MCNC: 17 MG/DL
CALCIUM SERPL-MCNC: 9.6 MG/DL
CHLORIDE SERPL-SCNC: 103 MMOL/L
CO2 SERPL-SCNC: 26 MMOL/L
CREAT SERPL-MCNC: 0.7 MG/DL
EGFR: 93 ML/MIN/1.73M2
EOSINOPHIL # BLD AUTO: 0.06 K/UL
EOSINOPHIL NFR BLD AUTO: 0.7 %
GLUCOSE SERPL-MCNC: 97 MG/DL
HCT VFR BLD CALC: 40.3 %
HGB BLD-MCNC: 13.7 G/DL
IMM GRANULOCYTES NFR BLD AUTO: 0.3 %
LYMPHOCYTES # BLD AUTO: 1.07 K/UL
LYMPHOCYTES NFR BLD AUTO: 12.4 %
MAGNESIUM SERPL-MCNC: 2 MG/DL
MAN DIFF?: NORMAL
MCHC RBC-ENTMCNC: 30.3 PG
MCHC RBC-ENTMCNC: 34 G/DL
MCV RBC AUTO: 89.2 FL
MONOCYTES # BLD AUTO: 0.89 K/UL
MONOCYTES NFR BLD AUTO: 10.3 %
NEUTROPHILS # BLD AUTO: 6.57 K/UL
NEUTROPHILS NFR BLD AUTO: 75.8 %
PLATELET # BLD AUTO: 285 K/UL
POTASSIUM SERPL-SCNC: 4.5 MMOL/L
PROT SERPL-MCNC: 6.8 G/DL
RBC # BLD: 4.52 M/UL
RBC # FLD: 14.5 %
SODIUM SERPL-SCNC: 140 MMOL/L
T4 FREE SERPL-MCNC: 1 NG/DL
TSH SERPL-ACNC: 1.83 UIU/ML
WBC # FLD AUTO: 8.66 K/UL

## 2023-01-19 ENCOUNTER — LABORATORY RESULT (OUTPATIENT)
Age: 73
End: 2023-01-19

## 2023-01-19 ENCOUNTER — OUTPATIENT (OUTPATIENT)
Dept: OUTPATIENT SERVICES | Facility: HOSPITAL | Age: 73
LOS: 1 days | End: 2023-01-19

## 2023-01-19 ENCOUNTER — APPOINTMENT (OUTPATIENT)
Dept: HEMATOLOGY ONCOLOGY | Facility: CLINIC | Age: 73
End: 2023-01-19
Payer: MEDICARE

## 2023-01-19 VITALS
SYSTOLIC BLOOD PRESSURE: 161 MMHG | HEART RATE: 64 BPM | RESPIRATION RATE: 18 BRPM | WEIGHT: 138 LBS | BODY MASS INDEX: 26.06 KG/M2 | HEIGHT: 61 IN | DIASTOLIC BLOOD PRESSURE: 83 MMHG | TEMPERATURE: 97.8 F

## 2023-01-19 DIAGNOSIS — Z51.81 ENCOUNTER FOR THERAPEUTIC DRUG LVL MONITORING: ICD-10-CM

## 2023-01-19 DIAGNOSIS — Z90.49 ACQUIRED ABSENCE OF OTHER SPECIFIED PARTS OF DIGESTIVE TRACT: Chronic | ICD-10-CM

## 2023-01-19 DIAGNOSIS — Z51.81 ENCOUNTER FOR THERAPEUTIC DRUG LEVEL MONITORING: ICD-10-CM

## 2023-01-19 DIAGNOSIS — C50.411 MALIGNANT NEOPLASM OF UPPER-OUTER QUADRANT OF RIGHT FEMALE BREAST: ICD-10-CM

## 2023-01-19 DIAGNOSIS — Z90.13 ACQUIRED ABSENCE OF BILATERAL BREASTS AND NIPPLES: Chronic | ICD-10-CM

## 2023-01-19 DIAGNOSIS — Z79.811 ENCOUNTER FOR THERAPEUTIC DRUG LVL MONITORING: ICD-10-CM

## 2023-01-19 DIAGNOSIS — M85.80 OTHER SPECIFIED DISORDERS OF BONE DENSITY AND STRUCTURE, UNSPECIFIED SITE: ICD-10-CM

## 2023-01-19 DIAGNOSIS — Z90.710 ACQUIRED ABSENCE OF BOTH CERVIX AND UTERUS: Chronic | ICD-10-CM

## 2023-01-19 DIAGNOSIS — Z95.828 PRESENCE OF OTHER VASCULAR IMPLANTS AND GRAFTS: Chronic | ICD-10-CM

## 2023-01-19 LAB
ALBUMIN SERPL ELPH-MCNC: 4.6 G/DL
ALP BLD-CCNC: 79 U/L
ALT SERPL-CCNC: 10 U/L
ANION GAP SERPL CALC-SCNC: 11 MMOL/L
AST SERPL-CCNC: 17 U/L
BILIRUB DIRECT SERPL-MCNC: <0.2 MG/DL
BILIRUB INDIRECT SERPL-MCNC: >0.4 MG/DL
BILIRUB SERPL-MCNC: 0.6 MG/DL
BUN SERPL-MCNC: 16 MG/DL
CALCIUM SERPL-MCNC: 10.1 MG/DL
CHLORIDE SERPL-SCNC: 104 MMOL/L
CO2 SERPL-SCNC: 27 MMOL/L
CREAT SERPL-MCNC: 0.7 MG/DL
EGFR: 93 ML/MIN/1.73M2
GLUCOSE SERPL-MCNC: 92 MG/DL
HCT VFR BLD CALC: 40.9 %
HGB BLD-MCNC: 13.6 G/DL
MAGNESIUM SERPL-MCNC: 2 MG/DL
MCHC RBC-ENTMCNC: 30 PG
MCHC RBC-ENTMCNC: 33.3 G/DL
MCV RBC AUTO: 90.3 FL
PLATELET # BLD AUTO: 255 K/UL
PMV BLD: 10.1 FL
POTASSIUM SERPL-SCNC: 4.4 MMOL/L
PROT SERPL-MCNC: 7.1 G/DL
RBC # BLD: 4.53 M/UL
RBC # FLD: 14.2 %
SODIUM SERPL-SCNC: 142 MMOL/L
WBC # FLD AUTO: 6.12 K/UL

## 2023-01-19 PROCEDURE — 99214 OFFICE O/P EST MOD 30 MIN: CPT

## 2023-01-19 RX ORDER — ABEMACICLIB 150 MG/1
150 TABLET ORAL
Qty: 60 | Refills: 2 | Status: DISCONTINUED | COMMUNITY
Start: 2022-10-06 | End: 2023-01-19

## 2023-01-19 RX ORDER — TAMOXIFEN CITRATE 20 MG/1
20 TABLET, FILM COATED ORAL DAILY
Qty: 90 | Refills: 2 | Status: ACTIVE | COMMUNITY
Start: 2023-01-19 | End: 1900-01-01

## 2023-01-19 RX ORDER — EXEMESTANE 25 MG/1
25 TABLET, FILM COATED ORAL DAILY
Qty: 90 | Refills: 2 | Status: DISCONTINUED | COMMUNITY
Start: 2022-10-06 | End: 2023-01-19

## 2023-01-19 RX ORDER — ABEMACICLIB 100 MG/1
100 TABLET ORAL
Qty: 60 | Refills: 1 | Status: DISCONTINUED | COMMUNITY
Start: 2022-12-08 | End: 2023-01-19

## 2023-01-20 PROBLEM — Z51.81 ENCOUNTER FOR MEDICATION MONITORING: Status: ACTIVE | Noted: 2022-11-07

## 2023-01-20 PROBLEM — Z51.81 ENCOUNTER FOR MONITORING AROMATASE INHIBITOR THERAPY: Status: ACTIVE | Noted: 2022-11-07

## 2023-01-20 LAB
CANCER AG15-3 SERPL-ACNC: 31 U/ML
CEA SERPL-MCNC: 1.9 NG/ML

## 2023-01-20 NOTE — ASSESSMENT
[FreeTextEntry1] : 73 yo female has stage IIIA ([id number]) right breast multifocal invasive poorly differentiated micropapillary carcinoma, 3.7 cm and invasive well differentiated tubulo-lobular variant of ductal carcinoma, 0.8 cm, ER/GA positive, Her-2 negative, s/p right breast modified radical skin-sparing mastectomy, right ALND, left breast simple skin-sparing mastectomy and left axillary SLNB with immediate direct to implant reconstruction. 8/12 right axillary lymph nodes are positive for metastatic carcinoma with extensive extracapsular extension.\par Oncotype RS is 27. \par Staging PET/CT on 8/16/21 was negative for distant mets. \par S/P AC x 4 and Taxol x 14 completed on 4/14/22. \par S/P adjuvant PMRT S/P \par The left breast has atypical lobular hyperplasia. \par \par Assessment and Plan:\par -- Discussed treatment related side effects from  Exemestane and Abemaciclib. She feels miserable and wants to discontinue both drugs. She was made aware that her risk of cancer recurrence is high due to multiple positive lymph nodes. Adjuvant endocrine therapy is very important and AI is better than Tamoxifen.  In light of 8 positive lymph nodes and KI 67 20-25%, she will be benefit from addition of CDK 4/6 inhibitor Abemaciclib along with adjuvant endocrine therapy. However, she insists to stop. She was then given an option to take Tamoxifen which should not give lot of muscular skeletal symptoms. The side effects of BINGHAM were reviewed which may include but not limit to a small increased risk for endodermal cancer, increased risk for cardiovascular events, thrombosis, vasomotor symptoms, depression and cataract. She is s/p hysterectomy and would not be concerned for the risk of uterine cancer. She wants to try Tamoxifen. A script was sent to her pharmacy. \par -- Order blood work for CBC, BMP, LFT, Mg,  and CEA.  \par -- TTE on 9/30/21 showed normal LV Ejection Fraction of 66 %. Echo also reported moderately enlarged right ventricle, mild-moderate tricuspid regurgitation, mild to moderate pulmonic valve regurgitation. Discussed with Dr. Robert, cardiology. He compared patient's current and previous Echo. There is only mild enlarged right ventricle. She saw Dr. Ghavami and had repeat 2D Echo. \par -- Completed for adjuvant PMRT 8/31/22.  Follow up with Dr Bagley.\par -- Bone density 5/22, showed osteopenia T score -1.8. Advised pt to take calcium and vitamin D supplement. Encourage regular exercise. \par -- Follow up with PMD and cardiology regarding HTN meds. \par -- RTO for f/u in 3 month. She will call if there is any new concern.\par \par \par \par \par \par

## 2023-01-20 NOTE — CONSULT LETTER
[Dear  ___] : Dear  [unfilled], [Courtesy Letter:] : I had the pleasure of seeing your patient, [unfilled], in my office today. [Please see my note below.] : Please see my note below. [Sincerely,] : Sincerely, [DrPatrick  ___] : Dr. EAST [DrPatrick ___] : Dr. EAST [___] : [unfilled] [FreeTextEntry3] : Keyla Tejada MD

## 2023-01-20 NOTE — HISTORY OF PRESENT ILLNESS
[de-identified] : JOSE FRANCISCO THOMAS is a 71 year old female PMHx of cervical cancer s/p hysterectomy and unilateral salpingo-oophorectomy,  HTN presents for newly diagnosed invasive breast cancer. \par \par Patient first palpated a mass in the RUOQ herself within the past year. She had pain at the site and feels that it is growing. She went for b/l diagnostic mammo and sonogram on 6/17/2021. Her left breast had no suspicious findings however on her right breast a spiculated mass in the RUOQ as well as a second mass superior to with a thickened axillary 2.2 cm axillary node was seen. \par -breasts are heterogeneously dense\par -R: UOQ, spiculated mass, with calcifications, c/w US finding below \par -R: Medial to this mass in the posterior depth, an area of architectural distortion. \par -R: Slightly superior to this mass, there is a an additional circumscribed mass also seen on concurrent ultrasound.\par Right breast:\par - In the axilla, at the 10 to 11:00 position 12 cm from the nipple, there is a cortically thickened axillary lymph node measuring 2.2 cm --> BIOPSY \par -Corresponding to the area palpable concern at the 10 to 11:00 position 5 to 8 cm from the nipple, there is a spiculated mass with associated coarse calcifications measuring 2.7 x 1.2 x 1.8 cm --> BIOPSY \par -At the 11:00 position 10 cm from the nipple, there is a circumscribed hypoechoic mass measuring 1.3 x 1.2 x 0.4 cm, corresponding to mammographic findings --> BIOPSY \par -At the 6:00 position 5 cm from the nipple, there is a circumscribed hypoechoic mass measuring 0.3 x 0.3 x 0.2 cm.\par -At the 6:00 position 4 cm from the nipple, there is a circumscribed hypoechoic mass measuring 0.6 x 0.6 x 0.2 cm --> BIOPSY \par BI-RADS Category 5: Highly Suggestive of Malignancy\par \par On 7/21/2021, she went for US guided biopsy of  the 2cm mass which showed invasive moderately differential ductal carcinoma, with dominant micrpapillary features\par 1. Breast, right 10-11 N5-8 mass, ultrasound guided needle core\par biopsies:\par - Invasive moderately differentiated ductal carcinoma with dominant micropapillary features, ER 99%, NH 15%. Ki67 20%, Her 2 negative (IHC 1+, Her2/CEP17 1.9)\par - Ductal carcinoma in-situ (DCIS), cribriform type with comedo\par necrosis and microcalcifications, intermediate nuclear grade.\par - Foci of lymphovascular invasion are present.\par \par 2. Breast, right 11 N10 mass, ultrasound guided needle core biopsies:\par - Benign atrophic fatty breast tissue with a dominant macrocyst wall fragment demonstrating an attenuated epithelial lining; consistent with a dilated duct.\par \par 3. Breast, right 6 N4 mass, ultrasound guided needle core biopsies:\par - Hyalinized fibroadenoma.\par \par 4. Breast, right axillary mass, ultrasound guided needle core biopsies:\par - Lymph node fragments containing metastatic carcinoma (1/1), the largest contiguous focus of which measures 6.0 mm (microscopic measurement).\par - No extracapsular extension is identified in this biopsy material.\par \par On 8/16/21, she had a PET/CT which showed pathologic FDG uptake (SUV 7) coregistering with 3.6 x 2 cm right breast mass and pathologic FDG uptake in 2 cm right axillary nodule (SUV 6.1) consistent with documented biologic tumor activity. No other sites of abnormal FDG uptake\par \par On 8/18/21, she had b/l breast MRI MRI which showed the biopsy proven right breast carcinoma 1.9 x 3.7 x 3.5 cm and 2 cm right axillary adenopathy. No additional suspicious enhancement in either breast. \par \par She saw Dr. Ferguson for surgical consultation. She was given options to have neoadjuvant chemo followed by surgery or immediate surgery. \par \par Her family history is significant for breast cancer in her paternal aunt in her 40s,  2 uncles who had "brain cancer"; and her mom who had cervical cancer. \par  [de-identified] : 10/27/21:\dharmesh Galicia is here today for followup visit. She was previously seen on 8/20/21. She was diagnosed with  IDC of the right breast with 3 cm mass and positive right axillary lymph node, ER/WV positive, Her-2 negative. Clinical stage is T3N1 with Oncotype RS 27. She was given an option to have neoadjuvant chemotherapy prior to surgery but she opted to have surgery first. On 9/29/21, she underwent right breast modified radical skin-sparing mastectomy, right ALND, left breast simple skin-sparing mastectomy and left axillary SLNB with immediate direct to implant reconstruction. \par - The right mastectomy revealed 3.7 cm invasive poorly differentiated micropapillary carcinoma, ER 85%, WV 15%, Her-2 negative (FISH ration 1.8), Ki 67 20-25% and 0.8 cm invasive well differentiated tubulo-lobular variant of ductal carcinoma, %, WV 90%, Her-2 negative and Ki 67 3-5%,  Non-extensive ductal carcinoma in-situ (DCIS), cribriform and micropapillary types with comedo necrosis and microcalcifications, intermediate to focally high nuclear grade. Numerous foci of lymphovascular invasion are present. The invasive tumor extends to focally involve (touches both ink and cautery) the blue inked surface. The nipple, skin, and deep fascial margin are negative for carcinoma. Right ALND reveal metastatic carcinoma present in eight out of twelve axillary lymph nodes (8/12) with extensive extracapsular extension seen. The largest node contains a metallic "ring-shaped" biopsy clip and shows focal prior biopsy site changes.\par AJCC 8th Edition Pathologic Stage, m(2)pT2, pN2a, pMx.\par - The left breast simple skin-sparing mastectomy show atrophic predominantly fatty breast tissue with focal atypical lobular hyperplasia (ALH). 2 sentinel lymph noses are negative for malignancy.  \par She recovered well from surgery and is here today to discuss adjuvant systemic therapy. \par \par 12/2/21\dharmesh Galicia is here today for followup visit and chemotherapy. She has stage IIIA ([id number]) right breast multifocal invasive poorly differentiated micropapillary carcinoma, 3.7 cm and invasive well differentiated tubulo-lobular variant of ductal carcinoma, 0.8 cm, ER/WV positive, Her-2 negative, s/p right breast modified radical skin-sparing mastectomy, right ALND, left breast simple skin-sparing mastectomy and left axillary SLNB with immediate implant reconstruction. 8/12 right axillary lymph nodes are positive for metastatic carcinoma with extensive extracapsular extension. Oncotype RS is 27. She is indicated for adjuvant chemotherapy.  She had port a cath placed on 11/18 to left chest wall and felt pain at port site with movement of left upper extremity. She followed up with IR regarding concerns, no evidence of port infection, or blockage/clot within port catheter. She also had HOMA done on 9/30 LV EF normal. \par \par 12/16/21\dharmesh Galicia is here today for followup visit and chemotherapy. She has stage IIIA ([id number]) right breast multifocal invasive poorly differentiated micropapillary carcinoma, 3.7 cm and invasive well differentiated tubulo-lobular variant of ductal carcinoma, 0.8 cm, ER/WV positive, Her-2 negative, s/p right breast modified radical skin-sparing mastectomy, right ALND, left breast simple skin-sparing mastectomy and left axillary SLNB with immediate implant reconstruction. 8/12 right axillary lymph nodes are positive for metastatic carcinoma with extensive extracapsular extension. Oncotype RS is 27. She started adjuvant chemotherapy with dose-dense AC. To date she received 1 cycle of AC, tolerating well. She denies nausea, vomiting, diarrhea or breast pain. She c/o that her constipation is worse from her baseline, taking Senna twice daily.\par She also had HOMA done on 9/30/21 LV EF normal. \par \par 12/30/21\dharmesh Galicia is here today for followup visit and chemotherapy. She has stage IIIA ([id number]) right breast multifocal invasive poorly differentiated micropapillary carcinoma, 3.7 cm and invasive well differentiated tubulo-lobular variant of ductal carcinoma, 0.8 cm, ER/WV positive, Her-2 negative, s/p right breast modified radical skin-sparing mastectomy, right ALND, left breast simple skin-sparing mastectomy and left axillary SLNB with immediate implant reconstruction. 8/12 right axillary lymph nodes are positive for metastatic carcinoma with extensive extracapsular extension. She started adjuvant chemotherapy with dose-dense AC. To date she received 2 cycles of AC, tolerating well. She denies nausea, vomiting, diarrhea or breast pain. Her constipation has improved with senna 3 tablets and colace. She also had HOMA done on 9/30/21 LV EF normal. \par \par 1/13/22:jossie Galicia is here today for followup visit and chemotherapy. She has stage IIIA ([id number]) right breast multifocal invasive poorly differentiated micropapillary carcinoma, 3.7 cm and invasive well differentiated tubulo-lobular variant of ductal carcinoma, 0.8 cm, ER/WV positive, Her-2 negative, s/p right breast modified radical skin-sparing mastectomy, right ALND, left breast simple skin-sparing mastectomy and left axillary SLNB with immediate implant reconstruction. 8/12 right axillary lymph nodes are positive for metastatic carcinoma with extensive extracapsular extension. She has been on adjuvant chemotherapy with dose-dense AC. To date she received 3 cycles of AC, tolerating well. She saw Dr. Robert and had repeat 2D Echo. \par \par 1/27/22:jossie Galicia is here today for followup visit and chemotherapy. She has stage IIIA ([id number]) right breast multifocal invasive poorly differentiated micropapillary carcinoma, 3.7 cm and invasive well differentiated tubulo-lobular variant of ductal carcinoma, 0.8 cm, ER/WV positive, Her-2 negative, s/p right breast modified radical skin-sparing mastectomy, right ALND, left breast simple skin-sparing mastectomy and left axillary SLNB with immediate implant reconstruction. 8/12 right axillary lymph nodes are positive for metastatic carcinoma with extensive extracapsular extension. She has been on adjuvant chemotherapy. She finished dose-dense AC x 4. She reported white coating on oral mucosa. She was given Nystatin swishing and spiting. She is feeling better now. She saw Dr. Robert and had repeat 2D Echo. \par \par 2/24/22jossie Galicia is here today for followup visit and chemotherapy. She has stage IIIA ([id number]) right breast multifocal invasive poorly differentiated micropapillary carcinoma, 3.7 cm and invasive well differentiated tubulo-lobular variant of ductal carcinoma, 0.8 cm, ER/WV positive, Her-2 negative, s/p right breast modified radical skin-sparing mastectomy, right ALND, left breast simple skin-sparing mastectomy and left axillary SLNB with immediate implant reconstruction. 8/12 right axillary lymph nodes are positive for metastatic carcinoma with extensive extracapsular extension. She has been on adjuvant chemotherapy. She finished dose-dense AC x 4. She is currently on weekly Taxol. Today, she is scheduled for 5/12 Taxol.  She c/o bloatedness, hearing loss, anxiety, nervousness, feeling agitated.  She is not happy about gaining 5 lbs in 1 month.  Denies fever, N/V/C/D, paresthesias.\par \par 3/24/22:jossie Floresmary is here today for followup visit and chemotherapy. She has stage IIIA ([id number]) right breast multifocal invasive poorly differentiated micropapillary carcinoma, 3.7 cm and invasive well differentiated tubulo-lobular variant of ductal carcinoma, 0.8 cm, ER/WV positive, Her-2 negative, s/p right breast modified radical skin-sparing mastectomy, right ALND, left breast simple skin-sparing mastectomy and left axillary SLNB with immediate implant reconstruction. 8/12 right axillary lymph nodes are positive for metastatic carcinoma with extensive extracapsular extension. She has been on adjuvant chemotherapy. She finished dose-dense AC x 4. She is currently on weekly Taxol. Today, she is scheduled for 9/12 Taxol.  She reports chemotherapy-related nail changes. \par \par 4/14/22:jossie Galicia is here today for followup visit and chemotherapy. She has stage IIIA ([id number]) right breast multifocal invasive poorly differentiated micropapillary carcinoma, 3.7 cm and invasive well differentiated tubulo-lobular variant of ductal carcinoma, 0.8 cm, ER/WV positive, Her-2 negative, s/p right breast modified radical skin-sparing mastectomy, right ALND, left breast simple skin-sparing mastectomy and left axillary SLNB with immediate implant reconstruction. 8/12 right axillary lymph nodes are positive for metastatic carcinoma with extensive extracapsular extension. She has been on adjuvant chemotherapy. She finished dose-dense AC x 4. She is currently on weekly Taxol. Today, she is scheduled for last treatment (week 12 Taxol).  \par \par 5/12/2022jossie Galicia is here today for followup visit. She has stage IIIA ([id number]) right breast multifocal invasive poorly differentiated micropapillary carcinoma, 3.7 cm and invasive well differentiated tubulo-lobular variant of ductal carcinoma, 0.8 cm, ER/WV positive, Her-2 negative, s/p right breast modified radical skin-sparing mastectomy, right ALND, left breast simple skin-sparing mastectomy and left axillary SLNB with immediate implant reconstruction. 8/12 right axillary lymph nodes are positive for metastatic carcinoma with extensive extracapsular extension. She completed adjuvant chemotherapy with dose-dense AC x 4 followed by weekly Taxol x 12. She followed up with Dr Ferguson and is planned for port removal on 5/23/22 and MRI breast on 5/16/22. She has not started Letrozole but has meds available at home. She is due for baseline DEXA scan.\par \par 7/22/22\dharmesh Galicia is here today for followup visit. She has stage IIIA ([id number]) right breast multifocal invasive poorly differentiated micropapillary carcinoma, 3.7 cm and invasive well differentiated tubulo-lobular variant of ductal carcinoma, 0.8 cm, ER/WV positive, Her-2 negative, s/p right breast modified radical skin-sparing mastectomy, right ALND, left breast simple skin-sparing mastectomy and left axillary SLNB with immediate implant reconstruction. 8/12 right axillary lymph nodes are positive for metastatic carcinoma with extensive extracapsular extension. She completed adjuvant chemotherapy with dose-dense AC x 4 followed by weekly Taxol x 12. She had port removed on with Dr Ferguson.  She started adjuvant PMRT on 6/14. She started letrozole in May, experienced increasing arthralgia and hot flashes. She stopped taking letrozole 2 weeks ago. She also stopped metoprolol and candesartan, for low BP at home, according to patient it was 100/60s as per patient. She has not followed up with PMD or cardiologist. She had DEXA on 5/16/22 which showed osteopenia T score -1.2. She had bilateral breast MRI on 5/16/21 which showed no evidence of malignancy. \par \par 10/6/22jossie Frida is here today for followup visit. She has stage IIIA ([id number]) right breast multifocal invasive poorly differentiated micropapillary carcinoma, ER/WV positive, Her-2 negative, s/p right breast modified radical skin-sparing mastectomy, right ALND, left breast simple skin-sparing mastectomy and left axillary SLNB with immediate implant reconstruction. 8/12 right axillary lymph nodes are positive for metastatic carcinoma with extensive extracapsular extension. She completed adjuvant chemotherapy with dose-dense AC x 4 followed by weekly Taxol x 12. She completed adjuvant PMRT from 6/30/2022 through 8/31/2022. She started letrozole in May, experienced increasing arthralgia and hot flashes, therefore stopped in July. She had DEXA on 5/16/22 which showed osteopenia T score -1.2. She had bilateral breast MRI on 5/16/21 which showed no evidence of malignancy. She feels well and does not have new complains.\par \par 11/7/22:\dharmesh Galicia is here today for followup visit. She has stage IIIA ([id number]) right breast multifocal invasive poorly differentiated micropapillary carcinoma, ER/WV positive, Her-2 negative, s/p right breast modified radical skin-sparing mastectomy, right ALND, left breast simple skin-sparing mastectomy and left axillary SLNB with immediate implant reconstruction. 8/12 right axillary lymph nodes are positive for metastatic carcinoma with extensive extracapsular extension. She completed adjuvant chemotherapy with dose-dense AC x 4 followed by weekly Taxol x 12. She completed adjuvant PMRT from 6/30/2022 through 8/31/2022. She started letrozole in May, experienced increasing arthralgia and hot flashes. She stopped in July. Since her previous visit in 10/2022, she started on Exemestane and tolerated better. But she complains weight gain. She also started on Abemaciclib 150 mg q12h. To date, she has been on treatment for 3 weeks. She reports frequent diarrhea > 3 times per day and cramps. She does not have n/v or shin rash. \par \par 12/8/22jossie Galicia is here today for followup visit. She has stage IIIA ([id number]) right breast multifocal invasive poorly differentiated micropapillary carcinoma, ER/WV positive, Her-2 negative, s/p right breast modified radical skin-sparing mastectomy, right ALND, left breast simple skin-sparing mastectomy and left axillary SLNB with immediate implant reconstruction. 8/12 right axillary lymph nodes are positive for metastatic carcinoma with extensive extracapsular extension. She completed adjuvant chemotherapy with dose-dense AC x 4 followed by weekly Taxol x 12. She completed adjuvant PMRT from 6/30/2022 through 8/31/2022. She started letrozole in 5/2022, experienced increasing arthralgia and hot flashes. She stopped in July. Since her previous visit in 10/2022, she started on Exemestane. Pt is c/o of b/l hand pain and right leg pain. Pt will continue with the Exemestane daily. Pt states she has an increased appetite and weight gain. She also started on Abemaciclib 150 mg q12h. due to the diarrhea pt is taking abemacicib 150 daily. To date, she has been on treatment for 4 weeks.  Diarrhea decreased from 3 episodes daily to 2 episodes daily. Denies n/v.\par \par 1/19/23:\par Frida is here today for followup visit. She has stage IIIA ([id number]) right breast multifocal invasive poorly differentiated micropapillary carcinoma, ER/WV positive, Her-2 negative, s/p right breast modified radical skin-sparing mastectomy, right ALND, left breast simple skin-sparing mastectomy and left axillary SLNB with immediate implant reconstruction. 8/12 right axillary lymph nodes are positive for metastatic carcinoma with extensive extracapsular extension. She completed adjuvant chemotherapy with dose-dense AC x 4 followed by weekly Taxol x 12. She completed adjuvant PMRT from 6/30/2022 through 8/31/2022. She started letrozole in 5/2022, experienced increasing arthralgia and hot flashes. She stopped in July. She switched to Exemestane in 10/2022 and tolerated better but still feels joint aching. She has been on reduced dose of Abemaciclib 150 mg q12h. Diarrhea has reduced. She wants to stop both Exemestane and Abemaciclib because of side effects.\par

## 2023-01-20 NOTE — PHYSICAL EXAM
[Restricted in physically strenuous activity but ambulatory and able to carry out work of a light or sedentary nature] : Status 1- Restricted in physically strenuous activity but ambulatory and able to carry out work of a light or sedentary nature, e.g., light house work, office work [Normal] : affect appropriate [de-identified] : S/P b/l mastectomy and implant reconstruction. Surgical incisions are healing well. There is no palpable abnormality.  [de-identified] : port-a-cath placed to left chest wall, intact, no erythema

## 2023-01-30 ENCOUNTER — APPOINTMENT (OUTPATIENT)
Dept: PLASTIC SURGERY | Facility: CLINIC | Age: 73
End: 2023-01-30
Payer: MEDICARE

## 2023-01-30 PROCEDURE — 99213 OFFICE O/P EST LOW 20 MIN: CPT

## 2023-01-30 NOTE — HISTORY OF PRESENT ILLNESS
[FreeTextEntry1] : Pt is a 72 y/o post menopausal F, , with PMH of scoliosis, HTN, Cervical CA s/p hysterectomy, and newly diagnosed RIGHT breast cancer who presents for breast reconstruction consultation. Pt states she self-palpated a lesion in February and underwent mammogram/US followed by biopsy confirming IDC and DCIS as well as right axillary +LN. Had not had a mammogram since age 32. Denies nipple bleeding, discharge, or inversion. She has decided to pursue bilateral mastectomy.   Dr. Ferguson recommend [facility].\par \par Per Dr. Tejada, will need either adjuvant or neoadjuvant chemotherapy. Pt has elected to do adjuvant chemotherapy.\par Pt states she would like to least amount of surgery possible for her reconstruction.\par \par H/o right lumpectomy x2 : benign nodules\par +family h/o breast cancer: paternal aunt, dx in her 40s\par \par Ht 5' Wt 128 lb  \par Current bra size: 34B, happy at this size\par Denies h/o VTE or MRSA infections\par Occ: Works at [employer]\par Here today with her daughter Sierra\par \par Interval hx (10/11/21): Pt presents today POD #12 s/p bilateral MRM and SLNB with immediate direct to implant reconstruction: left prepectoral, right subpectoral. Pt went to ED after discharge for increased left breast drain output and LLE swelling. Duplex negative. Now feeling better. Drain output: 1 (R IMF): /, 2 (R SL): , 3 (L SL): 10/7/7, 4 (L IMF): 10/5/5\par \par Interval hx (10/15/21):  Pt presents today POD #16 s/p bilateral MRM and SLNB with immediate direct to implant reconstruction: left prepectoral, right subpectoral. Taking PO abx as prescribed. C/o warmth to BL breasts but denies f/c or worsening pain or redness. Drain output: R- /5, L- 5//75\par \par Interval hx (10/25/21):  Here POD#26 s/p R MRM and SLNB with immediate direct to implant reconstruction; left pre-pectoral and right subpectoral.   left 15/10/8.  Denies fevers, chills, redness\par \par Interval hx (21):  Patient presents today POD#34 s/p B/L MRM and SLNB with immediate direct to implant reconstruction; left pre-pectoral and right subpectoral. Pt is doing better and reporting improvement in breast discomfort and swelling. Taking Gabapentin and Tylenol. Denies any f/c or drainage from incisions. \par \par Interval hx (11/15/21): 7 weeks s/p BL MRM and SLNB with immediate direct to implant reconstruction; left pre-pectoral and right subpectoral.  pt unhappy with asymmetry.  pt mostly unhappy 2/2 burning pain on left breast and stabbing pain at right IMF.   Pt to undergo chemotherapy and right breast XRT.\par \par Interval hx (3/14/22): 6 months s/p BL MRM and SLNB with immediate direct to implant reconstruction; left pre-pectoral and right subpectoral. Completed AC x4 and is now on weekly Taxol which she is tolerating much better. Would like to downsize implants to a "B" cup; also requesting left breast implant be placed retropectorally for symmetry. Concerned for left implant rupture due to change in appearance.\par \par Interval hx (22) 7 months s/p  MRM and SLNB with immediate direct to implant reconstruction; left pre-pectoral and right subpectoral.  Completed last cycle of chemotherapy last week.   She is start XRT after right breast re-excision with  Dr. Ferguson. She c/o persistent BL breast pain that she attributes from breast implant reconstruction.  Denies redness, swelling.  She is fearful of post-XRT related pain with the implants in place.   Pt has decided that she wishes to have BOTH her implants removed first, then undergo XRT.  She will consider her post-XRT breast reconstruction, if at all desires.\par \par Interval hx (10/31/22):  13 months s/p Right chest wall and supraclavicular XRT, completed 22.  On exomestane and Verzenio per [facility].  c/o right chest wall pain and left shoulder pain.  Daughter present Diana.  Patient has changed her mind and would like BL breast implant downsize.\par \par interval hx (23):  16 months s/p MRM and SLNB with immediate direct to implant reconstruction; left pre-pectoral and right subpectoral.   5 months s/p Right chest wall and supraclavicular XRT.  She went to see OT with some modest improvement of BL shoulder stiffness. She has noted right breast XRT changes; right breast shape has changed

## 2023-01-30 NOTE — PHYSICAL EXAM
[de-identified] : well-appearing, NAD [de-identified] : nonlabored breathing [de-identified] : ENEDELIAR [de-identified] : right lateral chest wall lymphedema [de-identified] : Bilateral breasts implants soft and mobile, left appears slightly larger than right due to implant position (left prepectoral, right subpectoral), incisions well-healed. excellent right pec muscle expansion now with XRt changed and overall soft tissue fibrosis and grade 3 capsular contracture with lymphedema and implant superior malposition 2/2 contracture [de-identified] : modest improvement of limited BL shoulder elevation and abduction\par RUE lymphedema grade 1/2

## 2023-01-30 NOTE — ASSESSMENT
[FreeTextEntry1] : 72 y/o F with newly diagnosed right breast IDC/DCIS and +axillary LN who has elected to undergo bilateral mastectomy\par \par 16 months s/p bilateral MRM and SLNB with immediate direct to implant reconstruction: left prepectoral, right subpectoral.  Breast asymmetry 2/2 right XRT-related capsular contracture\par 5 months s/p XRT\par \par Pt requesting BL breast implant downsize\par BIlateral frozen shoulder with modest/moderate improvement, not yet suitable candidate for surgery (high risk of positional-related neuropraxis and injury with current shoulder status)\par Right chest wall and RUE lymphedema\par \par -Pt not yet a suitable candidate for implant exchanged with recent completion of right chest XRT and frozen shoulder\par -c/w OT for BL frozen shoulder and RUE/chest wall LE\par -c/w singulair for XRT-related capsular contracture.  B/R/A reviewed. Side effects reviewed.  recent LFT wnl\par -All questions were answered to patient's apparent satisfaction\par -follow up in 3 months for interval check\par \par Photos were taken with patient permission.\par \par Due to COVID-19, pre-visit patient instructions were explained to the patient and their symptoms were checked upon arrival. Masks were used by the healthcare provider and staff and the examination room was cleaned after the patient visit concluded\par

## 2023-02-01 DIAGNOSIS — Z79.811 LONG TERM (CURRENT) USE OF AROMATASE INHIBITORS: ICD-10-CM

## 2023-02-03 ENCOUNTER — OUTPATIENT (OUTPATIENT)
Dept: OUTPATIENT SERVICES | Facility: HOSPITAL | Age: 73
LOS: 1 days | End: 2023-02-03

## 2023-02-03 DIAGNOSIS — I89.0 LYMPHEDEMA, NOT ELSEWHERE CLASSIFIED: ICD-10-CM

## 2023-02-03 DIAGNOSIS — M75.01 ADHESIVE CAPSULITIS OF RIGHT SHOULDER: ICD-10-CM

## 2023-02-03 DIAGNOSIS — Z90.710 ACQUIRED ABSENCE OF BOTH CERVIX AND UTERUS: Chronic | ICD-10-CM

## 2023-02-03 DIAGNOSIS — M75.02 ADHESIVE CAPSULITIS OF LEFT SHOULDER: ICD-10-CM

## 2023-02-03 DIAGNOSIS — Z95.828 PRESENCE OF OTHER VASCULAR IMPLANTS AND GRAFTS: Chronic | ICD-10-CM

## 2023-02-03 DIAGNOSIS — Z90.13 ACQUIRED ABSENCE OF BILATERAL BREASTS AND NIPPLES: Chronic | ICD-10-CM

## 2023-02-03 DIAGNOSIS — Z90.49 ACQUIRED ABSENCE OF OTHER SPECIFIED PARTS OF DIGESTIVE TRACT: Chronic | ICD-10-CM

## 2023-02-07 ENCOUNTER — OUTPATIENT (OUTPATIENT)
Dept: OUTPATIENT SERVICES | Facility: HOSPITAL | Age: 73
LOS: 1 days | End: 2023-02-07
Payer: MEDICARE

## 2023-02-07 ENCOUNTER — APPOINTMENT (OUTPATIENT)
Dept: BREAST CENTER | Facility: CLINIC | Age: 73
End: 2023-02-07
Payer: MEDICARE

## 2023-02-07 VITALS
DIASTOLIC BLOOD PRESSURE: 66 MMHG | WEIGHT: 136 LBS | HEIGHT: 61 IN | BODY MASS INDEX: 25.68 KG/M2 | SYSTOLIC BLOOD PRESSURE: 136 MMHG

## 2023-02-07 DIAGNOSIS — Z95.828 PRESENCE OF OTHER VASCULAR IMPLANTS AND GRAFTS: Chronic | ICD-10-CM

## 2023-02-07 DIAGNOSIS — Z00.8 ENCOUNTER FOR OTHER GENERAL EXAMINATION: ICD-10-CM

## 2023-02-07 DIAGNOSIS — Z90.710 ACQUIRED ABSENCE OF BOTH CERVIX AND UTERUS: Chronic | ICD-10-CM

## 2023-02-07 DIAGNOSIS — C50.411 MALIGNANT NEOPLASM OF UPPER-OUTER QUADRANT OF RIGHT FEMALE BREAST: ICD-10-CM

## 2023-02-07 DIAGNOSIS — Z90.13 ACQUIRED ABSENCE OF BILATERAL BREASTS AND NIPPLES: ICD-10-CM

## 2023-02-07 DIAGNOSIS — Z90.13 ACQUIRED ABSENCE OF BILATERAL BREASTS AND NIPPLES: Chronic | ICD-10-CM

## 2023-02-07 DIAGNOSIS — Z17.0 MALIGNANT NEOPLASM OF UPPER-OUTER QUADRANT OF RIGHT FEMALE BREAST: ICD-10-CM

## 2023-02-07 DIAGNOSIS — Z90.49 ACQUIRED ABSENCE OF OTHER SPECIFIED PARTS OF DIGESTIVE TRACT: Chronic | ICD-10-CM

## 2023-02-07 PROCEDURE — 97110 THERAPEUTIC EXERCISES: CPT | Mod: GO

## 2023-02-07 PROCEDURE — 99213 OFFICE O/P EST LOW 20 MIN: CPT

## 2023-02-07 NOTE — PHYSICAL EXAM
[Normocephalic] : normocephalic [EOMI] : extra ocular movement intact [No Supraclavicular Adenopathy] : no supraclavicular adenopathy [No Cervical Adenopathy] : no cervical adenopathy [Examined in the supine and seated position] : examined in the supine and seated position [No dominant masses] : no dominant masses in right breast  [No dominant masses] : no dominant masses left breast [No Axillary Lymphadenopathy] : no left axillary lymphadenopathy [Soft] : abdomen soft [No Ulceration] : no ulceration [de-identified] : NL respirations [de-identified] : post radiation lateral skin changes noted

## 2023-02-07 NOTE — REASON FOR VISIT
[Follow-Up: _____] : a [unfilled] follow-up visit [FreeTextEntry1] : Hx of right breast cancer, metastatic to axillary nodes s/p B SSM and right axillary dissection and left SLNB 9/2021

## 2023-02-07 NOTE — ASSESSMENT
[FreeTextEntry1] : Patient is a 72F with history of right IDC (+/+/-) metastatic to right axilla s/p B SSM , R ALND (8/12) and L SLNB on 9/29/2021 with direct to implant reconstruction. [id number].  She underwent adjuvant ACT and right chest wall and subclav radiation from 6/2022 – 8/2022.  Unable to tolerate abemaciclib. Switching to letrozole from exemestane.  Of note, she has bilateral frozen shoulders.  Her PE today unremarkable.  Patient is to continue follow up with radiation oncology (Dr. Bagley), last seen in 10/2022 with a 6 month follow up recommended. She is to continue follow up with medical oncology (Dr. Tejada), last seen in 1/2023 with 3 month follow up recommended and with plastic surgery (Dr. Barrios), last seen in 1/2023 with 3 month follow up recommended.  The importance of continuing with CBE was re-iterated to the patient. She understands.  All questions and concerns were answered in detail.  Patient for 6 month follow up.  If she decides to undergo surgery, want to obtain MRI prior.  She is to follow up with medical oncology, radiation oncology and plastic surgery as scheduled.  She is to continue OT for bilateral frozen shoulders.  Total time spent on encounter was greater than 20 minutes , which included face to face time with the patient, performing an exam, reviewing previous medical records, reviewing current imaging/ pathology, documenting in patient record and coordinating care/imaging. Greater than 50% of the encounter was spent on counseling and coordination of her breast issue.

## 2023-02-08 DIAGNOSIS — Z00.8 ENCOUNTER FOR OTHER GENERAL EXAMINATION: ICD-10-CM

## 2023-02-10 ENCOUNTER — APPOINTMENT (OUTPATIENT)
Dept: BREAST CENTER | Facility: CLINIC | Age: 73
End: 2023-02-10

## 2023-02-20 ENCOUNTER — OUTPATIENT (OUTPATIENT)
Dept: OUTPATIENT SERVICES | Facility: HOSPITAL | Age: 73
LOS: 1 days | End: 2023-02-20

## 2023-02-20 DIAGNOSIS — Z90.13 ACQUIRED ABSENCE OF BILATERAL BREASTS AND NIPPLES: Chronic | ICD-10-CM

## 2023-02-20 DIAGNOSIS — Z00.8 ENCOUNTER FOR OTHER GENERAL EXAMINATION: ICD-10-CM

## 2023-02-20 DIAGNOSIS — Z95.828 PRESENCE OF OTHER VASCULAR IMPLANTS AND GRAFTS: Chronic | ICD-10-CM

## 2023-02-20 DIAGNOSIS — Z90.49 ACQUIRED ABSENCE OF OTHER SPECIFIED PARTS OF DIGESTIVE TRACT: Chronic | ICD-10-CM

## 2023-02-20 DIAGNOSIS — Z90.710 ACQUIRED ABSENCE OF BOTH CERVIX AND UTERUS: Chronic | ICD-10-CM

## 2023-03-03 ENCOUNTER — OUTPATIENT (OUTPATIENT)
Dept: OUTPATIENT SERVICES | Facility: HOSPITAL | Age: 73
LOS: 1 days | End: 2023-03-03
Payer: MEDICARE

## 2023-03-03 DIAGNOSIS — Z00.8 ENCOUNTER FOR OTHER GENERAL EXAMINATION: ICD-10-CM

## 2023-03-03 DIAGNOSIS — Z95.828 PRESENCE OF OTHER VASCULAR IMPLANTS AND GRAFTS: Chronic | ICD-10-CM

## 2023-03-03 DIAGNOSIS — Z90.710 ACQUIRED ABSENCE OF BOTH CERVIX AND UTERUS: Chronic | ICD-10-CM

## 2023-03-03 DIAGNOSIS — Z90.49 ACQUIRED ABSENCE OF OTHER SPECIFIED PARTS OF DIGESTIVE TRACT: Chronic | ICD-10-CM

## 2023-03-03 DIAGNOSIS — Z90.13 ACQUIRED ABSENCE OF BILATERAL BREASTS AND NIPPLES: Chronic | ICD-10-CM

## 2023-03-03 PROCEDURE — 97110 THERAPEUTIC EXERCISES: CPT | Mod: GO

## 2023-03-06 RX ORDER — ANASTROZOLE TABLETS 1 MG/1
1 TABLET ORAL
Qty: 30 | Refills: 0 | Status: ACTIVE | COMMUNITY
Start: 2023-01-30 | End: 1900-01-01

## 2023-03-08 ENCOUNTER — OUTPATIENT (OUTPATIENT)
Dept: OUTPATIENT SERVICES | Facility: HOSPITAL | Age: 73
LOS: 1 days | End: 2023-03-08

## 2023-03-08 DIAGNOSIS — Z90.49 ACQUIRED ABSENCE OF OTHER SPECIFIED PARTS OF DIGESTIVE TRACT: Chronic | ICD-10-CM

## 2023-03-08 DIAGNOSIS — Z90.13 ACQUIRED ABSENCE OF BILATERAL BREASTS AND NIPPLES: Chronic | ICD-10-CM

## 2023-03-08 DIAGNOSIS — Z00.8 ENCOUNTER FOR OTHER GENERAL EXAMINATION: ICD-10-CM

## 2023-03-08 DIAGNOSIS — Z95.828 PRESENCE OF OTHER VASCULAR IMPLANTS AND GRAFTS: Chronic | ICD-10-CM

## 2023-03-08 DIAGNOSIS — Z90.710 ACQUIRED ABSENCE OF BOTH CERVIX AND UTERUS: Chronic | ICD-10-CM

## 2023-03-10 ENCOUNTER — OUTPATIENT (OUTPATIENT)
Dept: OUTPATIENT SERVICES | Facility: HOSPITAL | Age: 73
LOS: 1 days | End: 2023-03-10

## 2023-03-10 DIAGNOSIS — Z00.8 ENCOUNTER FOR OTHER GENERAL EXAMINATION: ICD-10-CM

## 2023-03-10 DIAGNOSIS — Z95.828 PRESENCE OF OTHER VASCULAR IMPLANTS AND GRAFTS: Chronic | ICD-10-CM

## 2023-03-10 DIAGNOSIS — Z90.710 ACQUIRED ABSENCE OF BOTH CERVIX AND UTERUS: Chronic | ICD-10-CM

## 2023-03-10 DIAGNOSIS — Z90.49 ACQUIRED ABSENCE OF OTHER SPECIFIED PARTS OF DIGESTIVE TRACT: Chronic | ICD-10-CM

## 2023-03-10 DIAGNOSIS — Z90.13 ACQUIRED ABSENCE OF BILATERAL BREASTS AND NIPPLES: Chronic | ICD-10-CM

## 2023-03-22 ENCOUNTER — OUTPATIENT (OUTPATIENT)
Dept: OUTPATIENT SERVICES | Facility: HOSPITAL | Age: 73
LOS: 1 days | End: 2023-03-22

## 2023-03-22 DIAGNOSIS — Z95.828 PRESENCE OF OTHER VASCULAR IMPLANTS AND GRAFTS: Chronic | ICD-10-CM

## 2023-03-22 DIAGNOSIS — Z90.710 ACQUIRED ABSENCE OF BOTH CERVIX AND UTERUS: Chronic | ICD-10-CM

## 2023-03-22 DIAGNOSIS — Z90.49 ACQUIRED ABSENCE OF OTHER SPECIFIED PARTS OF DIGESTIVE TRACT: Chronic | ICD-10-CM

## 2023-03-22 DIAGNOSIS — Z00.8 ENCOUNTER FOR OTHER GENERAL EXAMINATION: ICD-10-CM

## 2023-03-22 DIAGNOSIS — Z90.13 ACQUIRED ABSENCE OF BILATERAL BREASTS AND NIPPLES: Chronic | ICD-10-CM

## 2023-03-23 ENCOUNTER — APPOINTMENT (OUTPATIENT)
Dept: HEMATOLOGY ONCOLOGY | Facility: CLINIC | Age: 73
End: 2023-03-23

## 2023-03-24 ENCOUNTER — OUTPATIENT (OUTPATIENT)
Dept: OUTPATIENT SERVICES | Facility: HOSPITAL | Age: 73
LOS: 1 days | End: 2023-03-24

## 2023-03-24 DIAGNOSIS — Z90.49 ACQUIRED ABSENCE OF OTHER SPECIFIED PARTS OF DIGESTIVE TRACT: Chronic | ICD-10-CM

## 2023-03-24 DIAGNOSIS — Z95.828 PRESENCE OF OTHER VASCULAR IMPLANTS AND GRAFTS: Chronic | ICD-10-CM

## 2023-03-24 DIAGNOSIS — Z90.13 ACQUIRED ABSENCE OF BILATERAL BREASTS AND NIPPLES: Chronic | ICD-10-CM

## 2023-03-24 DIAGNOSIS — Z00.8 ENCOUNTER FOR OTHER GENERAL EXAMINATION: ICD-10-CM

## 2023-03-24 DIAGNOSIS — Z90.710 ACQUIRED ABSENCE OF BOTH CERVIX AND UTERUS: Chronic | ICD-10-CM

## 2023-03-29 ENCOUNTER — OUTPATIENT (OUTPATIENT)
Dept: OUTPATIENT SERVICES | Facility: HOSPITAL | Age: 73
LOS: 1 days | End: 2023-03-29

## 2023-03-29 DIAGNOSIS — Z00.8 ENCOUNTER FOR OTHER GENERAL EXAMINATION: ICD-10-CM

## 2023-03-29 DIAGNOSIS — Z90.710 ACQUIRED ABSENCE OF BOTH CERVIX AND UTERUS: Chronic | ICD-10-CM

## 2023-03-29 DIAGNOSIS — Z95.828 PRESENCE OF OTHER VASCULAR IMPLANTS AND GRAFTS: Chronic | ICD-10-CM

## 2023-03-29 DIAGNOSIS — Z90.13 ACQUIRED ABSENCE OF BILATERAL BREASTS AND NIPPLES: Chronic | ICD-10-CM

## 2023-03-29 DIAGNOSIS — Z90.49 ACQUIRED ABSENCE OF OTHER SPECIFIED PARTS OF DIGESTIVE TRACT: Chronic | ICD-10-CM

## 2023-04-05 ENCOUNTER — APPOINTMENT (OUTPATIENT)
Dept: RADIATION ONCOLOGY | Facility: HOSPITAL | Age: 73
End: 2023-04-05

## 2023-04-06 ENCOUNTER — OUTPATIENT (OUTPATIENT)
Dept: OUTPATIENT SERVICES | Facility: HOSPITAL | Age: 73
LOS: 1 days | End: 2023-04-06
Payer: MEDICARE

## 2023-04-06 DIAGNOSIS — Z00.8 ENCOUNTER FOR OTHER GENERAL EXAMINATION: ICD-10-CM

## 2023-04-06 DIAGNOSIS — Z90.710 ACQUIRED ABSENCE OF BOTH CERVIX AND UTERUS: Chronic | ICD-10-CM

## 2023-04-06 DIAGNOSIS — Z95.828 PRESENCE OF OTHER VASCULAR IMPLANTS AND GRAFTS: Chronic | ICD-10-CM

## 2023-04-06 DIAGNOSIS — Z90.49 ACQUIRED ABSENCE OF OTHER SPECIFIED PARTS OF DIGESTIVE TRACT: Chronic | ICD-10-CM

## 2023-04-06 DIAGNOSIS — Z90.13 ACQUIRED ABSENCE OF BILATERAL BREASTS AND NIPPLES: Chronic | ICD-10-CM

## 2023-04-06 PROCEDURE — 97140 MANUAL THERAPY 1/> REGIONS: CPT | Mod: GO

## 2023-04-06 PROCEDURE — 97110 THERAPEUTIC EXERCISES: CPT | Mod: GO

## 2023-04-07 ENCOUNTER — OUTPATIENT (OUTPATIENT)
Dept: OUTPATIENT SERVICES | Facility: HOSPITAL | Age: 73
LOS: 1 days | End: 2023-04-07

## 2023-04-07 DIAGNOSIS — Z90.13 ACQUIRED ABSENCE OF BILATERAL BREASTS AND NIPPLES: Chronic | ICD-10-CM

## 2023-04-07 DIAGNOSIS — Z95.828 PRESENCE OF OTHER VASCULAR IMPLANTS AND GRAFTS: Chronic | ICD-10-CM

## 2023-04-07 DIAGNOSIS — Z00.8 ENCOUNTER FOR OTHER GENERAL EXAMINATION: ICD-10-CM

## 2023-04-07 DIAGNOSIS — Z90.49 ACQUIRED ABSENCE OF OTHER SPECIFIED PARTS OF DIGESTIVE TRACT: Chronic | ICD-10-CM

## 2023-04-07 DIAGNOSIS — Z90.710 ACQUIRED ABSENCE OF BOTH CERVIX AND UTERUS: Chronic | ICD-10-CM

## 2023-04-21 ENCOUNTER — OUTPATIENT (OUTPATIENT)
Dept: OUTPATIENT SERVICES | Facility: HOSPITAL | Age: 73
LOS: 1 days | End: 2023-04-21

## 2023-04-21 DIAGNOSIS — Z00.8 ENCOUNTER FOR OTHER GENERAL EXAMINATION: ICD-10-CM

## 2023-04-21 DIAGNOSIS — Z95.828 PRESENCE OF OTHER VASCULAR IMPLANTS AND GRAFTS: Chronic | ICD-10-CM

## 2023-04-21 DIAGNOSIS — Z90.13 ACQUIRED ABSENCE OF BILATERAL BREASTS AND NIPPLES: Chronic | ICD-10-CM

## 2023-04-21 DIAGNOSIS — Z90.49 ACQUIRED ABSENCE OF OTHER SPECIFIED PARTS OF DIGESTIVE TRACT: Chronic | ICD-10-CM

## 2023-04-21 DIAGNOSIS — Z90.710 ACQUIRED ABSENCE OF BOTH CERVIX AND UTERUS: Chronic | ICD-10-CM

## 2023-04-24 ENCOUNTER — APPOINTMENT (OUTPATIENT)
Dept: PLASTIC SURGERY | Facility: CLINIC | Age: 73
End: 2023-04-24
Payer: MEDICARE

## 2023-04-24 DIAGNOSIS — B37.9 CANDIDIASIS, UNSPECIFIED: ICD-10-CM

## 2023-04-24 PROCEDURE — 99214 OFFICE O/P EST MOD 30 MIN: CPT

## 2023-04-24 RX ORDER — NYSTATIN 100000 [USP'U]/G
100000 CREAM TOPICAL TWICE DAILY
Qty: 1 | Refills: 1 | Status: ACTIVE | COMMUNITY
Start: 2023-04-24 | End: 1900-01-01

## 2023-04-24 NOTE — ASSESSMENT
[FreeTextEntry1] : 72 y/o F with newly diagnosed right breast IDC/DCIS and +axillary LN who has elected to undergo bilateral mastectomy\par \par 16 months s/p bilateral MRM and SLNB with immediate direct to implant reconstruction: left prepectoral, right subpectoral.  Breast asymmetry 2/2 right XRT-related capsular contracture\par 5 months s/p XRT\par \par Pt requesting BL breast implant downsize\par BIlateral frozen shoulder with modest/moderate improvement, not yet suitable candidate for surgery (high risk of positional-related neuropraxis and injury with current shoulder status)\par Right chest wall and RUE lymphedema\par Right axillary candidiasis\par \par -Pt not yet a suitable candidate for implant exchanged with recent completion of right chest XRT and frozen shoulder\par -c/w OT for BL frozen shoulder and RUE/chest wall LE\par -encouraged singulair for XRT-related capsular contracture.  Pt verbally endorsed that she would resume her 2 month supply.\par - Rx : nystatin for right axillary candidiasis\par - ok to resume work at [employer] (weight restriction to 10-15 lbs)\par -All questions were answered to patient's apparent satisfaction\par -follow up in 6 weeks for interval check\par \par Photos were taken with patient permission at last visit\par \par Due to COVID-19, pre-visit patient instructions were explained to the patient and their symptoms were checked upon arrival. Masks were used by the healthcare provider and staff and the examination room was cleaned after the patient visit concluded\par

## 2023-04-24 NOTE — HISTORY OF PRESENT ILLNESS
[FreeTextEntry1] : Pt is a 72 y/o post menopausal F, , with PMH of scoliosis, HTN, Cervical CA s/p hysterectomy, and newly diagnosed RIGHT breast cancer who presents for breast reconstruction consultation. Pt states she self-palpated a lesion in February and underwent mammogram/US followed by biopsy confirming IDC and DCIS as well as right axillary +LN. Had not had a mammogram since age 32. Denies nipple bleeding, discharge, or inversion. She has decided to pursue bilateral mastectomy.   Dr. Ferguson recommend SSM.\par \par Per Dr. Tejada, will need either adjuvant or neoadjuvant chemotherapy. Pt has elected to do adjuvant chemotherapy.\par Pt states she would like to least amount of surgery possible for her reconstruction.\par \par H/o right lumpectomy x2 : benign nodules\par +family h/o breast cancer: paternal aunt, dx in her 40s\par \par Ht 5' Wt 128 lb  \par Current bra size: 34B, happy at this size\par Denies h/o VTE or MRSA infections\par Occ: Works at [employer]\par Here today with her daughter Sierra\par \par Interval hx (10/11/21): Pt presents today POD #12 s/p bilateral MRM and SLNB with immediate direct to implant reconstruction: left prepectoral, right subpectoral. Pt went to ED after discharge for increased left breast drain output and LLE swelling. Duplex negative. Now feeling better. Drain output: 1 (R IMF): /, 2 (R SL): , 3 (L SL): 10/7/7, 4 (L IMF): 10/5/5\par \par Interval hx (10/15/21):  Pt presents today POD #16 s/p bilateral MRM and SLNB with immediate direct to implant reconstruction: left prepectoral, right subpectoral. Taking PO abx as prescribed. C/o warmth to BL breasts but denies f/c or worsening pain or redness. Drain output: R- /5, L- 5//75\par \par Interval hx (10/25/21):  Here POD#26 s/p R MRM and SLNB with immediate direct to implant reconstruction; left pre-pectoral and right subpectoral.   left 15/10/8.  Denies fevers, chills, redness\par \par Interval hx (21):  Patient presents today POD#34 s/p B/L MRM and SLNB with immediate direct to implant reconstruction; left pre-pectoral and right subpectoral. Pt is doing better and reporting improvement in breast discomfort and swelling. Taking Gabapentin and Tylenol. Denies any f/c or drainage from incisions. \par \par Interval hx (11/15/21): 7 weeks s/p BL MRM and SLNB with immediate direct to implant reconstruction; left pre-pectoral and right subpectoral.  pt unhappy with asymmetry.  pt mostly unhappy 2/2 burning pain on left breast and stabbing pain at right IMF.   Pt to undergo chemotherapy and right breast XRT.\par \par Interval hx (3/14/22): 6 months s/p BL MRM and SLNB with immediate direct to implant reconstruction; left pre-pectoral and right subpectoral. Completed AC x4 and is now on weekly Taxol which she is tolerating much better. Would like to downsize implants to a "B" cup; also requesting left breast implant be placed retropectorally for symmetry. Concerned for left implant rupture due to change in appearance.\par \par Interval hx (22) 7 months s/p  MRM and SLNB with immediate direct to implant reconstruction; left pre-pectoral and right subpectoral.  Completed last cycle of chemotherapy last week.   She is start XRT after right breast re-excision with  Dr. Ferguson. She c/o persistent BL breast pain that she attributes from breast implant reconstruction.  Denies redness, swelling.  She is fearful of post-XRT related pain with the implants in place.   Pt has decided that she wishes to have BOTH her implants removed first, then undergo XRT.  She will consider her post-XRT breast reconstruction, if at all desires.\par \par Interval hx (10/31/22):  13 months s/p Right chest wall and supraclavicular XRT, completed 22.  On exomestane and Verzenio per Med[facility]c.  c/o right chest wall pain and left shoulder pain.  Daughter present Diana.  Patient has changed her mind and would like BL breast implant downsize.\par \par interval hx (23):  16 months s/p MRM and SLNB with immediate direct to implant reconstruction; left pre-pectoral and right subpectoral.   5 months s/p Right chest wall and supraclavicular XRT.  She went to see OT with some modest improvement of BL shoulder stiffness. She has noted right breast XRT changes; right breast shape has changed\par \par interval hx (23):  19 months s/p MRM and SLNB with immediate direct to implant reconstruction; left pre-pectoral and right subpectoral.   8 months s/p Right chest wall and supraclavicular XRT.   She has been going to OT with with significant improvement (2x/week visits).  Pt stopped singulair CC tx after a month on her own.  She did not feel any changes to right breast pain.   here to discuss surgery readiness.  pt considering BL breast implant removal and foregoing reconstruction.

## 2023-04-24 NOTE — PHYSICAL EXAM
[de-identified] : well-appearing, NAD [de-identified] : nonlabored breathing [de-identified] : ENEDELIAR [de-identified] : right lateral chest wall lymphedema [de-identified] : Bilateral breasts implants soft and mobile, left appears slightly larger than right due to implant position (left prepectoral, right subpectoral), incisions well-healed. excellent right pec muscle expansion now with XRt changed and overall soft tissue fibrosis and grade 3 capsular contracture with lymphedema and implant superior malposition 2/2 contracture [de-identified] : significant improvement of limited BL shoulder elevation and abduction, still restricted with shoulder lateral extension and external rotation\par RUE lymphedema grade 1/2.  Right axillary candidiasis

## 2023-07-03 ENCOUNTER — APPOINTMENT (OUTPATIENT)
Dept: HEMATOLOGY ONCOLOGY | Facility: CLINIC | Age: 73
End: 2023-07-03

## 2023-07-17 ENCOUNTER — APPOINTMENT (OUTPATIENT)
Dept: PLASTIC SURGERY | Facility: CLINIC | Age: 73
End: 2023-07-17
Payer: MEDICARE

## 2023-07-17 PROCEDURE — 99214 OFFICE O/P EST MOD 30 MIN: CPT

## 2023-07-17 NOTE — ASSESSMENT
[FreeTextEntry1] : 72 y/o F with newly diagnosed right breast IDC/DCIS and +axillary LN who has elected to undergo bilateral mastectomy\par \par 22 months s/p bilateral MRM and SLNB with immediate direct to implant reconstruction: left prepectoral, right subpectoral.  Breast asymmetry 2/2 right XRT-related capsular contracture\par 11 months s/p XRT\par \par Pt requesting BL breast implant downsize\par BIlateral frozen shoulder significant improvement, now a suitable candidate for surgery\par Right chest wall and RUE lymphedema\par Right axillary candidiasis, resolved\par \par Recommend bilateral breast implant exchange (downsize), capsulectomy, and indicated procedure\par Patient happy with right breast volume appearance with XRT changes\par \par -Regarding breast implants, we discussed saline vs silicone breast implants.  Patient selected silicone implants.\par -Regarding implant exchange, the benefits, risks, and outcomes of implant surgery discussed.  I discussed the incision location.  The risks included but are not limited to bleeding, infection, seroma, hematoma, paraesthesia, poor wound healing, implant failure (infection, extrusion, rupture), implant malposition, capsular contracture, asymmetry, small risk of breast-implant associated lymphoma, possible need for additional planned or unplanned surgery including revision and/or autologous tissue reconstruction (e.g. pedicled latissimus dorsi flap, TDAP flap, etc), and dissatisfaction with outcome.  \par -She was also informed on the off-label use of biologic mesh, its benefits, risks and alternatives. She was given the opportunity to ask questions; and all were answered to her satisfaction at this time.\par -Regarding capsular contracture, the patient understands the risk of recurrent capsular contracture. The risk of capsular contracture is increased with infection, radiation therapy, smoking, and history of prior implant capsular contracture.\par -We also discussed the likelihood of additional surgery on the breast over the course of her lifetime.\par -Post-operative course discussed including the need for surgical bra use and no heavy lifting\par -All questions were answered to the patient's apparent satisfaction, and informed consent was obtained.\par -c/w singulair for XRT-related capsular contracture. \par -All questions were answered to patient's apparent satisfaction\par -will schedule for outpatient JOB procedure\par \par Photos were taken with patient permission at last visit 1/2023\par \par Due to COVID-19, pre-visit patient instructions were explained to the patient and their symptoms were checked upon arrival. Masks were used by the healthcare provider and staff and the examination room was cleaned after the patient visit concluded\par

## 2023-07-17 NOTE — HISTORY OF PRESENT ILLNESS
[FreeTextEntry1] : Pt is a 72 y/o post menopausal F, , with PMH of scoliosis, HTN, Cervical CA s/p hysterectomy, and newly diagnosed RIGHT breast cancer who presents for breast reconstruction consultation. Pt states she self-palpated a lesion in February and underwent mammogram/US followed by biopsy confirming IDC and DCIS as well as right axillary +LN. Had not had a mammogram since age 32. Denies nipple bleeding, discharge, or inversion. She has decided to pursue bilateral mastectomy.   Dr. Ferguson recommend SSM.\par \par Per Dr. Tejada, will need either adjuvant or neoadjuvant chemotherapy. Pt has elected to do adjuvant chemotherapy.\par Pt states she would like to least amount of surgery possible for her reconstruction.\par \par H/o right lumpectomy x2 : benign nodules\par +family h/o breast cancer: paternal aunt, dx in her 40s\par \par Ht 5' Wt 128 lb  \par Current bra size: 34B, happy at this size\par Denies h/o VTE or MRSA infections\par Occ: Works at [employer]\par Here today with her daughter Sierra\par \par Interval hx (10/11/21): Pt presents today POD #12 s/p bilateral MRM and SLNB with immediate direct to implant reconstruction: left prepectoral, right subpectoral. Pt went to ED after discharge for increased left breast drain output and LLE swelling. Duplex negative. Now feeling better. Drain output: 1 (R IMF): /, 2 (R SL): , 3 (L SL): 10/7/7, 4 (L IMF): 10/5/5\par \par Interval hx (10/15/21):  Pt presents today POD #16 s/p bilateral MRM and SLNB with immediate direct to implant reconstruction: left prepectoral, right subpectoral. Taking PO abx as prescribed. C/o warmth to BL breasts but denies f/c or worsening pain or redness. Drain output: R- /5, L- 5//75\par \par Interval hx (10/25/21):  Here POD#26 s/p R MRM and SLNB with immediate direct to implant reconstruction; left pre-pectoral and right subpectoral.   left 15/10/8.  Denies fevers, chills, redness\par \par Interval hx (21):  Patient presents today POD#34 s/p B/L MRM and SLNB with immediate direct to implant reconstruction; left pre-pectoral and right subpectoral. Pt is doing better and reporting improvement in breast discomfort and swelling. Taking Gabapentin and Tylenol. Denies any f/c or drainage from incisions. \par \par Interval hx (11/15/21): 7 weeks s/p BL MRM and SLNB with immediate direct to implant reconstruction; left pre-pectoral and right subpectoral.  pt unhappy with asymmetry.  pt mostly unhappy 2/2 burning pain on left breast and stabbing pain at right IMF.   Pt to undergo chemotherapy and right breast XRT.\par \par Interval hx (3/14/22): 6 months s/p BL MRM and SLNB with immediate direct to implant reconstruction; left pre-pectoral and right subpectoral. Completed AC x4 and is now on weekly Taxol which she is tolerating much better. Would like to downsize implants to a "B" cup; also requesting left breast implant be placed retropectorally for symmetry. Concerned for left implant rupture due to change in appearance.\par \par Interval hx (22) 7 months s/p  MRM and SLNB with immediate direct to implant reconstruction; left pre-pectoral and right subpectoral.  Completed last cycle of chemotherapy last week.   She is start XRT after right breast re-excision with  Dr. Ferguson. She c/o persistent BL breast pain that she attributes from breast implant reconstruction.  Denies redness, swelling.  She is fearful of post-XRT related pain with the implants in place.   Pt has decided that she wishes to have BOTH her implants removed first, then undergo XRT.  She will consider her post-XRT breast reconstruction, if at all desires.\par \par Interval hx (10/31/22):  13 months s/p Right chest wall and supraclavicular XRT, completed 22.  On exomestane and Verzenio per MedOnc.  c/o right chest wall pain and left shoulder pain.  Daughter present Diana.  Patient has changed her mind and would like BL breast implant downsize.\par \par interval hx (23):  16 months s/p MRM and SLNB with immediate direct to implant reconstruction; left pre-pectoral and right subpectoral.   5 months s/p Right chest wall and supraclavicular XRT.  She went to see OT with some modest improvement of BL shoulder stiffness. She has noted right breast XRT changes; right breast shape has changed\par \par interval hx (23):  19 months s/p MRM and SLNB with immediate direct to implant reconstruction; left pre-pectoral and right subpectoral.   8 months s/p Right chest wall and supraclavicular XRT.   She has been going to OT with with significant improvement (2x/week visits).  Pt stopped singulair CC tx after a month on her own.  She did not feel any changes to right breast pain.   here to discuss surgery readiness.  pt considering BL breast implant removal and foregoing reconstruction.\par \par Interval hx (23): 22 months s/p MRM and SLNB with immediate direct to implant reconstruction; left pre-pectoral and right subpectoral. 11 months s/p Right chest wall and supraclavicular XRT.   She has been going to OT with with significant improvement (2x/week visits).  She did not go to OT as recommended.  She reports that return to work has aided in significant improvement in ROM; now able to brush hair and reach for above head objects.  Would like to proceed with BL implant removal and downsize to treat right breast XRT changes and reach her desired A/B cup. She currently fits in a D cup.

## 2023-07-17 NOTE — PHYSICAL EXAM
[de-identified] : well-appearing, NAD [de-identified] : nonlabored breathing [de-identified] : ENEDELIAR [de-identified] : right lateral chest wall lymphedema [de-identified] : Bilateral breasts implants soft and mobile, left appears slightly larger than right due to implant position (left prepectoral, right subpectoral), incisions well-healed. excellent right pec muscle expansion now with XRT changed and overall soft tissue fibrosis and grade 3 capsular contracture with lymphedema and implant superior malposition 2/2 contracture [de-identified] : significant improvement of limited BL shoulder elevation and abduction, still restricted with shoulder lateral extension and external rotation\par RUE lymphedema grade 1/2.  Right axillary candidiasis

## 2023-09-21 NOTE — H&P PST ADULT - DENTITION
[place] [facility]  CONSULTATION      Patient:  Brian Alvarado Date of Service:  9/21/2023   YOB: 1942 Admission Date:  9/18/2023   MRN:  [id number] Attending:  Lukas Ashley MD   PCP:  Salvatore Ledesma Correction Dose   Subcutaneous Nightly Boris Norwood MD   3 Units at 09/18/23 2122   • insulin lispro (ADMELOG,HumaLOG) - Correction Dose   Subcutaneous TID WC Boris Norwood Klein, who formulated the plan.      Delmer Wolff MD  Internal Medicine, PGY1  9/21/2023 2:26 PM  Pager: [phone], [phone]     normal

## 2023-10-24 ENCOUNTER — APPOINTMENT (OUTPATIENT)
Dept: PLASTIC SURGERY | Facility: AMBULATORY SURGERY CENTER | Age: 73
End: 2023-10-24

## 2023-10-30 ENCOUNTER — APPOINTMENT (OUTPATIENT)
Dept: PLASTIC SURGERY | Facility: CLINIC | Age: 73
End: 2023-10-30

## 2023-11-07 ENCOUNTER — APPOINTMENT (OUTPATIENT)
Dept: BREAST CENTER | Facility: CLINIC | Age: 73
End: 2023-11-07

## 2024-03-27 NOTE — PRE-ANESTHESIA EVALUATION ADULT - WEIGHT IN KG
Grzegorz Posada is a 56 year old male here for  Chief Complaint   Patient presents with    Follow-up     Small cell lung cancer      Denies latex allergy or sensitivity.    Medication verified, no changes.  PCP and Pharmacy verified.    Social History     Tobacco Use   Smoking Status Former    Current packs/day: 2.00    Average packs/day: 2.0 packs/day for 39.2 years (78.5 ttl pk-yrs)    Types: Cigarettes    Start date: 1985   Smokeless Tobacco Never   Tobacco Comments    Quit in October      Advance Directives Filed: No    ECOG:   ECOG [03/27/24 0822]   ECOG Performance Status 1       Vitals:    Visit Vitals  BP (!) 152/78 (BP Location: RUE - Right upper extremity, Patient Position: Sitting, Cuff Size: Regular)   Pulse 75   Temp 97.6 °F (36.4 °C) (Oral)   Ht 5' 11\" (1.803 m)   Wt 99.4 kg (219 lb 2.2 oz)   SpO2 97%   BMI 30.56 kg/m²       These vital signs are:  Within defined parameters (Per Reference \"Defined Limits Hospital Outpatient Department (HOD)\")    Height: No.  Ht Readings from Last 1 Encounters:   03/27/24 5' 11\" (1.803 m)     Weight:Yes, shoes off.  Wt Readings from Last 3 Encounters:   03/27/24 99.4 kg (219 lb 2.2 oz)   03/12/24 102.2 kg (225 lb 5 oz)   03/25/24 102.2 kg (225 lb 5 oz)       BMI: Body mass index is 30.56 kg/m².    REVIEW OF SYSTEMS  GENERAL:  Patient denies headache, fevers, chills, night sweats, excessive fatigue, change in appetite, weight loss, dizziness  ALLERGIC/IMMUNOLOGIC: Verified allergies: No  EYES:  Patient denies significant visual difficulties, double vision, blurred vision  ENT/MOUTH: Patient denies problems with hearing, sore throat, sinus drainage, mouth sores  ENDOCRINE:  Patient denies diabetes, thyroid disease, hormone replacement, hot flashes  HEMATOLOGIC/LYMPHATIC: Patient denies easy bruising, bleeding, tender lymph nodes, swollen lymph nodes  BREASTS: Patient denies abnormal masses of breast, nipple discharge, pain  RESPIRATORY:  Patient denies lung pain with  breathing, cough, coughing up blood, shortness of breath  CARDIOVASCULAR:  Patient denies anginal chest pain, palpitations, shortness of breath when lying flat, peripheral edema  GASTROINTESTINAL: Patient denies abdominal pain , nausea, vomiting, diarrhea, GI bleeding, constipation, change in bowel habits, heartburn, sensation of feeling full, difficulty swallowing  : Patient denies blood in the urine, burning with urination, frequency, urgency, hesitancy, incontinence  MUSCULOSKELETAL:  Patient denies joint pain, joint swelling, redness, decreased range of motion, but complains of: bone pain, pain rating: 10, location: chest, shoulders and back, patient describes this as bone pain.   SKIN:  Patient denies chronic rashes, inflammation, ulcerations, skin changes, itching  NEUROLOGIC:  Patient denies loss of balance, areas of focal weakness, abnormal gait, sensory problems, numbness, tingling  PSYCHIATRIC: Patient denies insomnia, depression, anxiety    This patient reported abnormal symptoms that needed immediate verbal communication: No     63

## 2024-05-14 NOTE — PHYSICAL THERAPY INITIAL EVALUATION ADULT - PLANNED THERAPY INTERVENTIONS, PT EVAL
Follow Up Office Visit      Patient Name: Izabel Abreu  : 1954   MRN: [id number]     Chief Complaint:    Chief Complaint   Patient presents with    OAB (overactive bladder)       Referring Provider: Destiny Eid Ra*    History of Present Illness: Izabel Abreu is a 69 y.o. female who presents today for follow up after full axonics implant.  She reports she is doing well.  She is dry and often does not use a pad any longer.  She had constipation but that has resolved.  She is taking oxybutynin still.  She has had 1 episode of stool incontinence.  She is very happy with her outcome and is much happier going out.     Subjective      Review of System:   Review of Systems   Constitutional: Negative.    HENT: Negative.     Eyes: Negative.    Respiratory: Negative.     Cardiovascular: Negative.    Gastrointestinal: Negative.    Endocrine: Negative.    Genitourinary: Negative.    Musculoskeletal: Negative.    Skin: Negative.    Allergic/Immunologic: Negative.    Neurological: Negative.    Hematological: Negative.    Psychiatric/Behavioral: Negative.        I have reviewed the ROS documented by my clinical staff, I have updated appropriately and I agree. Christine Ryder MD    I have reviewed and the following portions of the patient's history were updated as appropriate: past family history, past medical history, past social history, past surgical history and problem list.    Past Medical History:   Past Medical History:   Diagnosis Date    Allergic     Anxiety     Arthritis     Asthma     Colon polyp     Removed 6 polys 1 normat others stage 2    Diabetes mellitus     Diverticulosis     Elevated cholesterol     Exocrine pancreatic insufficiency     Flatulence     Heart murmur     HL (hearing loss) -23    Need hearing aides    Hypercalcemia     Hyperglycemia     Hyperparathyroidism     Hypertension     Irritable bowel syndrome     Sleep apnea     CPAP 7/14CM    Subclinical  hyperthyroidism     Thyroid nodule     Adonoma on parathyroid    Type 2 diabetes mellitus     Vitamin D deficiency        Past Surgical History:  Past Surgical History:   Procedure Laterality Date     SECTION      COLONOSCOPY      SCHEDULED FOR     D & C AND LAPAROSCOPY      INTERSTIM PLACEMENT N/A 2024    Procedure: AXONICS STAGES 1 AND 2 LEAD AND GENERATOR PLACEMENT;  Surgeon: Christine Ryder MD;  Location: [facility] OR;  Service: Urology;  Laterality: N/A;    OVARY SURGERY      Bilateral Salpino Oophorectomy and bilateral Salpingectomy    PARATHYROIDECTOMY N/A 2021    Procedure: PARATHYROIDECTOMY;  Surgeon: Apolinar Suh MD;  Location: [facility] OR;  Service: General;  Laterality: N/A;    SHOULDER SURGERY Right     Rotator cuff    SINUS SURGERY      TOE SURGERY      TUBAL ABDOMINAL LIGATION Bilateral        Family History:   family history includes Cancer in her mother; Diabetes in her father and mother; Hypertension in her brother, father, mother, and sister; Thyroid disease in her sister.   Otherwise pertinent FHx was reviewed and not pertinent to current issue.    Social History:    reports that she has never smoked. She has been exposed to tobacco smoke. She has never used smokeless tobacco. She reports that she does not currently use alcohol. She reports that she does not currently use drugs.    Medications:     Current Outpatient Medications:     acetaminophen (TYLENOL) 650 MG 8 hr tablet, Take 2 tablets by mouth Every 8 (Eight) Hours As Needed for Mild Pain., Disp: , Rfl:     Alpha-D-Galactosidase (Beano) tablet, Take 2 doses by mouth 3 (Three) Times a Day As Needed (FLATULENCE)., Disp: , Rfl:     amLODIPine (NORVASC) 5 MG tablet, TAKE 1 TABLET BY MOUTH DAILY, Disp: 90 tablet, Rfl: 1    bisoprolol (ZEBeta) 10 MG tablet, Take 1 tablet by mouth Daily., Disp: 90 tablet, Rfl: 1    Cholecalciferol (Vitamin D3) 1.25 MG (08851 UT) capsule, Take 1 capsule by mouth Every 7 (Seven) Days., Disp:  12 capsule, Rfl: 1    clobetasol (TEMOVATE) 0.05 % ointment, Apply 1 Application topically to the appropriate area as directed 3 (Three) Times a Day As Needed. As needed, Disp: , Rfl:     estradiol (ESTRACE) 0.1 MG/GM vaginal cream, Insert 1 g into the vagina 3 (Three) Times a Week if Needed (VAGINAL ATROPHY)., Disp: , Rfl:     fenofibrate micronized (LOFIBRA) 134 MG capsule, TAKE 1 CAPSULE BY MOUTH DAILY, Disp: 90 capsule, Rfl: 1    fluticasone (FLONASE) 50 MCG/ACT nasal spray, 2 sprays into the nostril(s) as directed by provider Daily As Needed for Rhinitis or Allergies., Disp: , Rfl:     glucose blood (OneTouch Verio) test strip, USE TO TEST BLOOD SUGARS DAILY. DX CODE E11.65, Disp: 100 each, Rfl: 3    losartan (COZAAR) 100 MG tablet, Take 1 tablet by mouth Daily., Disp: 90 tablet, Rfl: 1    meloxicam (MOBIC) 15 MG tablet, TAKE 1 TABLET BY MOUTH DAILY, Disp: 30 tablet, Rfl: 0    metFORMIN (GLUCOPHAGE) 1000 MG tablet, TAKE 1/2 TABLET TWICE A DAY, Disp: 90 tablet, Rfl: 3    methocarbamol (ROBAXIN) 750 MG tablet, Take 1 tablet by mouth 3 (Three) Times a Day., Disp: 90 tablet, Rfl: 1    olopatadine (PATADAY) 0.2 % solution ophthalmic solution, Administer 1 drop to both eyes Daily., Disp: , Rfl:     oxybutynin XL (DITROPAN-XL) 10 MG 24 hr tablet, TAKE 1 TABLET BY MOUTH DAILY, Disp: 30 tablet, Rfl: 2    pancrelipase, Lip-Prot-Amyl, (CREON) 32946-77756 units capsule delayed-release particles capsule, Take 3 capsules by mouth 3 (Three) Times a Day With Meals., Disp: , Rfl:     promethazine (PHENERGAN) 25 MG tablet, Take 1 tablet by mouth Every 8 (Eight) Hours As Needed for Nausea or Vomiting., Disp: 30 tablet, Rfl: 0    sertraline (ZOLOFT) 100 MG tablet, Take 1 tablet by mouth Daily., Disp: 90 tablet, Rfl: 1    simvastatin (ZOCOR) 40 MG tablet, TAKE 1 TABLET BY MOUTH DAILY (Patient taking differently: Take 1 tablet by mouth Every Night.), Disp: 90 tablet, Rfl: 1    Tirzepatide (Mounjaro) 7.5 MG/0.5ML solution  pen-injector pen, INJECT 7.5 MG UNDER THE SKIN ONCE WEEKLY, Disp: 2 mL, Rfl: 0    traMADol (Ultram) 50 MG tablet, Take 1 tablet by mouth Every 8 (Eight) Hours As Needed for Severe Pain., Disp: 10 tablet, Rfl: 0    traMADol (Ultram) 50 MG tablet, Take 1 tablet by mouth Every 8 (Eight) Hours As Needed for Severe Pain., Disp: 12 tablet, Rfl: 0    vitamin B-12 (CYANOCOBALAMIN) 1000 MCG tablet, Take 1 tablet by mouth Daily., Disp: , Rfl:     Allergies:   Allergies   Allergen Reactions    Diflucan [Fluconazole] Headache    Azithromycin GI Intolerance    Codeine Itching    Hydrocodone-Acetaminophen Hives    Latex Itching    Penicillins Hives    Quinapril Other (See Comments)     cough       IPSS Questionnaire (AUA-7):Bladder & Bowel Symptom Questionnaire    How often do you usually urinate during the day ?   1 - About every 3-4 hours   2.   How many timed do you urinate at night?   1 - 2 times at night   3.   What is the reason that you usually urinate?   2 - Moderate urge (can delay 10-60 min)   4.   Once you get the urge to go, how long can you     comfortably delay?   1 - 30-60 min   5.   How often do you get a sudden urge that makes you rush to the bathroom?   2 - A few times a month   6.   How often does a sudden urge to urinate result in you leaking urine or wetting pads?   2 - A few times a month   7.  In your opinion, how good is your bladder control?   2 - Good   8.  Do you have accidental bowel leakage?   yes   9.  Do you have difficulty fully emptying your bladder?   no   10.  Do you experience accidental leakage when performing some physical activity such as coughing, sneezing, laughing or exercise?   yes   11. Have you tried medications to help improve your symptoms?   yes   12. Would you be interested in learning about a long-lasting option that may help you with your symptoms?   yes                                                                             Total Score   11         Objective     Physical  "Exam:   Vital Signs:   Vitals:    05/14/24 1136   Weight: 79.8 kg (176 lb)   Height: 157.5 cm (62.01\")   PainSc: 0-No pain     Body mass index is 32.18 kg/m².     Physical Exam  Vitals and nursing note reviewed. Exam conducted with a chaperone present.   Constitutional:       General: She is awake. She is not in acute distress.     Appearance: Normal appearance.   HENT:      Head: Normocephalic and atraumatic.      Right Ear: External ear normal.      Left Ear: External ear normal.      Nose: Nose normal.   Eyes:      Conjunctiva/sclera: Conjunctivae normal.   Pulmonary:      Effort: Pulmonary effort is normal. No respiratory distress.   Abdominal:      General: Abdomen is flat. There is no distension.      Palpations: Abdomen is soft. There is no mass.      Tenderness: There is no abdominal tenderness. There is no right CVA tenderness, left CVA tenderness, guarding or rebound.      Hernia: No hernia is present.   Genitourinary:     Exam position: Lithotomy position.   Skin:     General: Skin is warm.   Neurological:      General: No focal deficit present.      Mental Status: She is alert and oriented to person, place, and time.      Gait: Gait normal.   Psychiatric:         Behavior: Behavior normal. Behavior is cooperative.         Thought Content: Thought content normal.         Judgment: Judgment normal.         Labs:   Brief Urine Lab Results  (Last result in the past 365 days)        Color   Clarity   Blood   Leuk Est   Nitrite   Protein   CREAT   Urine HCG        03/22/24 1502 Yellow   Clear   Negative   Negative   Negative   Negative                        Lab Results   Component Value Date    GLUCOSE 270 (H) 04/25/2024    CALCIUM 9.7 04/25/2024     04/25/2024    K 4.3 04/25/2024    CO2 24.0 04/25/2024    CL 99 04/25/2024    BUN 21 04/25/2024    CREATININE 0.84 04/25/2024    EGFRIFNONA 95 02/25/2022    BCR 25.0 04/25/2024    ANIONGAP 14.0 04/25/2024       Lab Results   Component Value Date    WBC 8.97 " "04/25/2024    HGB 12.5 04/25/2024    HCT 40.2 04/25/2024    MCV 85.9 04/25/2024     04/25/2024       No results found for: \"PSA\"    Images:   No Images in the past 120 days found..    Measures:   Tobacco:   Izabel Abreu  reports that she has never smoked. She has been exposed to tobacco smoke. She has never used smokeless tobacco. I    Urine Incontinence: Patient reports that she is not currently experiencing any symptoms of urinary incontinence.         Assessment / Plan      Assessment/Plan:   69 y.o. female who presented today for follow up after Axonics full implant.  She is extremely happy with her outcome.  She would like to try to stop her oxybutynin.  I advised her to stop and if her symptoms worsen she can restart or call so we can change her settings.  I will see her back in 3 months.     Diagnoses and all orders for this visit:    1. OAB (overactive bladder) (Primary)         Follow Up:   Return for Recheck.    I spent approximately 30 minutes providing clinical care for this patient; including review of patient's chart and provider documentation, face to face time spent with patient in examination room (obtaining history, performing physical exam, discussing diagnosis and management options), placing orders, and completing patient documentation.     Christine Ryder MD  [facility]  " balance training/bed mobility training/gait training/transfer training

## 2024-09-12 NOTE — HISTORY OF PRESENT ILLNESS
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 [id number]. stage IIIB AJCC 8th edition.   She underwent adjuvant chemo ddAC-T. Underwent right chest wall and subclav radiation 2022-2022. Couldn't tolerate abemaciclib. 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  -R: Slightly superior to this mass, there is a an additional circumscribed mass also seen on concurrent ultrasound. Right breast: - In the axilla, at the 10 to 11:00 position 12 cm from the nipple, there is a cortically thickened axillary lymph node measuring 2.2 cm --> BIOPSY  -Corresponding to the area palpable concern at the 10 to 11:00 position 5 to 8 cm from the nipple, there is a spiculated mass with associated coarse calcifications measuring 2.7 x 1.2 x 1.8 cm --> BIOPSY  -At the 11:00 position 10 cm from the nipple, there is a circumscribed hypoechoic mass measuring 1.3 x 1.2 x 0.4 cm, corresponding to mammographic findings --> BIOPSY  -At the 6:00 position 5 cm from the nipple, there is a circumscribed hypoechoic mass measuring 0.3 x 0.3 x 0.2 cm. -At the 6:00 position 4 cm from the nipple, there is a circumscribed hypoechoic mass measuring 0.6 x 0.6 x 0.2 cm --> BIOPSY  Left breast: No suspicious solid or cystic masses. No axillary adenopathy.   2021 - US Guided Core Bx: 1. Right, 10-11:00 N5-8, 2.7cm: (top-hat) - Invasive moderately differentiated ductal carcinoma with dominant micropapillary features. - Ductal carcinoma in-situ (DCIS), cribriform type with comedo necrosis and microcalcifications, intermediate nuclear grade. - Foci of lymphovascular invasion are present. -ER  (+)  99% -CA  (+)  15% -HER2  (-) on IHC -Ki-67  20%  2. Right, 11:00 N10, 1.3cm: (mini-cork) - Benign atrophic fatty breast tissue with a dominant macrocyst wall fragment demonstrating an attenuated epithelial lining; consistent with a dilated duct. Findings are benign concordant.  3. Right, 6:00 N4, 0.6cm: (stoplight clip)  - Hyalinized fibroadenoma. Findings are benign concordant.  4. Right, axillary mass 10-12:00 N12, 2.2cm: (twirl) - Lymph node fragments containing metastatic carcinoma (), the largest contiguous focus of which measures 6.0 mm (microscopic measurement). - No extracapsular extension is identified in this biopsy material. Findings are malignant concordant.  Bx specimen sent for Oncotype Dx = 27.   She underwent a PET/CT on 2021 and breast MRI on 2021 which did not reveal any additional areas of disease aside from her her right breast mass and metastatic right axillary lymph node.   2021 -- b/l SSM, R ALND, L SLN Bx with immediate reconstruction  1. RIGHT breast -UOQ, multi-focal invasive poorly differentiated micropapillary carcinoma  -T=3.7 cm  -invasive well differentiated tubulo-lobular variant of ductal carcinoma, 8 mm  -nonextensive DCIS  -invasive tumor focally involves a blue inked surface - LN with metastatic carcinoma with RAÚL   2. LEFT breast  -atypical lobular hyperplasia    She underwent adjuvant chemo ddAC-T. Underwent right chest wall and subclav radiation 2022-2022. Couldn't tolerate abemaciclib. Switched to tamoxifen form exemestane   She was undergoing OT for bilateral frozen shoulders.

## 2024-09-17 ENCOUNTER — NON-APPOINTMENT (OUTPATIENT)
Age: 74
End: 2024-09-17

## 2024-09-17 ENCOUNTER — APPOINTMENT (OUTPATIENT)
Dept: BREAST CENTER | Facility: CLINIC | Age: 74
End: 2024-09-17
Payer: MEDICARE

## 2024-09-17 VITALS
SYSTOLIC BLOOD PRESSURE: 157 MMHG | WEIGHT: 136 LBS | HEIGHT: 61 IN | DIASTOLIC BLOOD PRESSURE: 87 MMHG | BODY MASS INDEX: 25.68 KG/M2

## 2024-09-17 DIAGNOSIS — Z90.13 ACQUIRED ABSENCE OF BILATERAL BREASTS AND NIPPLES: ICD-10-CM

## 2024-09-17 DIAGNOSIS — Z91.89 OTHER SPECIFIED PERSONAL RISK FACTORS, NOT ELSEWHERE CLASSIFIED: ICD-10-CM

## 2024-09-17 DIAGNOSIS — Z17.0 MALIGNANT NEOPLASM OF UPPER-OUTER QUADRANT OF RIGHT FEMALE BREAST: ICD-10-CM

## 2024-09-17 DIAGNOSIS — C50.411 MALIGNANT NEOPLASM OF UPPER-OUTER QUADRANT OF RIGHT FEMALE BREAST: ICD-10-CM

## 2024-09-17 PROCEDURE — 99213 OFFICE O/P EST LOW 20 MIN: CPT

## 2024-12-11 NOTE — HISTORY OF PRESENT ILLNESS
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 [id number]. stage IIIB AJCC 8th edition. \par \par She underwent adjuvant chemo ddAC-T.\par Underwent right chest wall and subclav radiation 2022-2022.\par Couldn't tolerate abemaciclib. 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 \par -R: Slightly superior to this mass, there is a an additional circumscribed mass also seen on concurrent ultrasound.\par Right breast:\par - In the axilla, at the 10 to 11:00 position 12 cm from the nipple, there is a cortically thickened axillary lymph node measuring 2.2 cm --> BIOPSY \par -Corresponding to the area palpable concern at the 10 to 11:00 position 5 to 8 cm from the nipple, there is a spiculated mass with associated coarse calcifications measuring 2.7 x 1.2 x 1.8 cm --> BIOPSY \par -At the 11:00 position 10 cm from the nipple, there is a circumscribed hypoechoic mass measuring 1.3 x 1.2 x 0.4 cm, corresponding to mammographic findings --> BIOPSY \par -At the 6:00 position 5 cm from the nipple, there is a circumscribed hypoechoic mass measuring 0.3 x 0.3 x 0.2 cm.\par -At the 6:00 position 4 cm from the nipple, there is a circumscribed hypoechoic mass measuring 0.6 x 0.6 x 0.2 cm --> BIOPSY \par Left breast:\par No suspicious solid or cystic masses. No axillary adenopathy.\par \par \par 2021 - US Guided Core Bx:\par 1. Right, 10-11:00 N5-8, 2.7cm: (top-hat)\par - Invasive moderately differentiated ductal carcinoma with dominant micropapillary features.\par - Ductal carcinoma in-situ (DCIS), cribriform type with comedo necrosis and microcalcifications, intermediate nuclear grade.\par - Foci of lymphovascular invasion are present.\par -ER  (+)  99%\par -NM  (+)  15%\par -HER2  (-) on IHC\par -Ki-67  20%\par \par 2. Right, 11:00 N10, 1.3cm: (mini-cork)\par - Benign atrophic fatty breast tissue with a dominant macrocyst\par wall fragment demonstrating an attenuated epithelial lining;\par consistent with a dilated duct.\par Findings are benign concordant.\par \par 3. Right, 6:00 N4, 0.6cm: (stoplight clip) \par - Hyalinized fibroadenoma.\par Findings are benign concordant.\par \par 4. Right, axillary mass 10-12:00 N12, 2.2cm: (twirl)\par - Lymph node fragments containing metastatic carcinoma (),\par the largest contiguous focus of which measures 6.0 mm\par (microscopic measurement).\par - No extracapsular extension is identified in this biopsy material.\par Findings are malignant concordant.\par \par Bx specimen sent for Oncotype Dx = 27.\par \par \par She underwent a PET/CT on 2021 and breast MRI on 2021 which did not reveal any additional areas of disease aside from her her right breast mass and metastatic right axillary lymph node. \par \par 2021 -- b/l SSM, R ALND, L SLN Bx with immediate reconstruction \par 1. 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 LEFT breast \par -atypical lobular hyperplasia \par \par \par She underwent adjuvant chemo ddAC-T.\par Underwent right chest wall and subclav radiation 2022-2022.\par Couldn't tolerate abemaciclib. Switching to letrozole form exemestane\par \par She was last seen in clinic in 2022 and recommended a 6 month follow up.\par \par She is undergoing OT for bilateral frozen shoulders.\par \par  Note Text (......Xxx Chief Complaint.): This diagnosis correlates with the Render Risk Assessment In Note?: no Other (Free Text): Discussed cosmetic LN2. Quoted $50. Defers for now. Laser another option. Could consider consult with [facility] Detail Level: Simple

## 2025-03-28 ENCOUNTER — NON-APPOINTMENT (OUTPATIENT)
Age: 75
End: 2025-03-28

## 2025-03-28 ENCOUNTER — APPOINTMENT (OUTPATIENT)
Dept: CARDIOLOGY | Facility: CLINIC | Age: 75
End: 2025-03-28
Payer: MEDICARE

## 2025-03-28 VITALS — DIASTOLIC BLOOD PRESSURE: 82 MMHG | HEART RATE: 65 BPM | SYSTOLIC BLOOD PRESSURE: 142 MMHG

## 2025-03-28 VITALS — WEIGHT: 121 LBS | BODY MASS INDEX: 22.84 KG/M2 | HEIGHT: 61 IN

## 2025-03-28 DIAGNOSIS — I10 ESSENTIAL (PRIMARY) HYPERTENSION: ICD-10-CM

## 2025-03-28 DIAGNOSIS — E78.5 HYPERLIPIDEMIA, UNSPECIFIED: ICD-10-CM

## 2025-03-28 DIAGNOSIS — R94.31 ABNORMAL ELECTROCARDIOGRAM [ECG] [EKG]: ICD-10-CM

## 2025-03-28 DIAGNOSIS — R07.9 CHEST PAIN, UNSPECIFIED: ICD-10-CM

## 2025-03-28 PROCEDURE — 93000 ELECTROCARDIOGRAM COMPLETE: CPT

## 2025-03-28 PROCEDURE — 99204 OFFICE O/P NEW MOD 45 MIN: CPT | Mod: 25

## 2025-03-28 RX ORDER — METOPROLOL TARTRATE 25 MG/1
25 TABLET ORAL
Qty: 1 | Refills: 0 | Status: ACTIVE | COMMUNITY
Start: 2025-03-28 | End: 1900-01-01

## 2025-03-28 RX ORDER — ATORVASTATIN CALCIUM 40 MG/1
40 TABLET, FILM COATED ORAL DAILY
Qty: 90 | Refills: 3 | Status: ACTIVE | COMMUNITY

## 2025-03-28 RX ORDER — CARVEDILOL 6.25 MG/1
6.25 TABLET, FILM COATED ORAL TWICE DAILY
Qty: 180 | Refills: 3 | Status: ACTIVE | COMMUNITY

## 2025-03-28 RX ORDER — LOSARTAN POTASSIUM 100 MG/1
100 TABLET, FILM COATED ORAL DAILY
Qty: 90 | Refills: 3 | Status: ACTIVE | COMMUNITY

## 2025-03-28 NOTE — ASU PATIENT PROFILE, ADULT - BLOOD AVOIDANCE/RESTRICTIONS, PROFILE
Population Health Chart Review & Patient Outreach Details  Sent email to Alexi to schedule [facility] for may    Further Action Needed If Patient Returns Outreach:            Updates Requested / Reviewed:     []  Care Everywhere    []     []  External Sources ([facility], [facility], [facility], etc.)    [] [facility]   [] [facility]   [] Other:    []  Care Team Updated   []  Removed  or Duplicate Orders   []  Immunization Reconciliation Completed / Queried    [] [place]   [] [place]   [] [place]   [] [place]      Health Maintenance Topics Addressed and Outreach Outcomes / Actions Taken:             Breast Cancer Screening []  Mammogram Order Placed    []  Mammogram Screening Scheduled    []  External Records Requested & Care Team Updated if Applicable    []  External Records Uploaded & Care Team Updated if Applicable    []  Pt Declined Scheduling Mammogram    []  Pt Will Schedule with External Provider / Order Routed & Care Team Updated if Applicable              Cervical Cancer Screening []  Pap Smear Scheduled in Primary Care or OBGYN    []  External Records Requested & Care Team Updated if Applicable       []  External Records Uploaded, Care Team Updated, & History Updated if Applicable    []  Patient Declined Scheduling Pap Smear    []  Patient Will Schedule with External Provider & Care Team Updated if Applicable                  Colorectal Cancer Screening []  Colonoscopy Case Request / Referral / Home Test Order Placed    []  External Records Requested & Care Team Updated if Applicable    []  External Records Uploaded, Care Team Updated, & History Updated if Applicable    []  Patient Declined Completing Colon Cancer Screening    []  Patient Will Schedule with External Provider & Care Team Updated if Applicable    []  Fit Kit Mailed (add the SmartPhrase under additional notes)    []  Reminded Patient to Complete Home Test                Diabetic Eye Exam []  Eye Exam Screening Order Placed    []  Eye Camera  Scheduled or Optometry/Ophthalmology Referral Placed    []  External Records Requested & Care Team Updated if Applicable    []  External Records Uploaded, Care Team Updated, & History Updated if Applicable    []  Patient Declined Scheduling Eye Exam    []  Patient Will Schedule with External Provider & Care Team Updated if Applicable             Blood Pressure Control []  Primary Care Follow Up Visit Scheduled     []  Remote Blood Pressure Reading Captured    []  Patient Declined Remote Reading or Scheduling Appt - Escalated to PCP    []  Patient Will Call Back or Send Portal Message with Reading                 HbA1c & Other Labs []  Overdue Lab(s) Ordered    []  Overdue Lab(s) Scheduled    []  External Records Uploaded & Care Team Updated if Applicable    []  Primary Care Follow Up Visit Scheduled     []  Reminded Patient to Complete A1c Home Test    []  Patient Declined Scheduling Labs or Will Call Back to Schedule    []  Patient Will Schedule with External Provider / Order Routed, & Care Team Updated if Applicable           Primary Care Appointment []  Primary Care Appt Scheduled    []  Patient Declined Scheduling or Will Call Back to Schedule    []  Pt Established with External Provider, Updated Care Team, & Informed Pt to Notify Payor if Applicable           Medication Adherence /    Statin Use []  Primary Care Appointment Scheduled    []  Patient Reminded to  Prescription    []  Patient Declined, Provider Notified if Needed    []  Sent Provider Message to Review to Evaluate Pt for Statin, Add Exclusion Dx Codes, Document   Exclusion in Problem List, Change Statin Intensity Level to Moderate or High Intensity if Applicable                Osteoporosis Screening []  Dexa Order Placed    []  Dexa Appointment Scheduled    []  External Records Requested & Care Team Updated    []  External Records Uploaded, Care Team Updated, & History Updated if Applicable    []  Patient Declined Scheduling Dexa or Will Call  Back to Schedule    []  Patient Will Schedule with External Provider / Order Routed & Care Team Updated if Applicable       Additional Notes:            none

## 2025-05-27 ENCOUNTER — APPOINTMENT (OUTPATIENT)
Dept: CARDIOLOGY | Facility: CLINIC | Age: 75
End: 2025-05-27

## 2025-06-04 ENCOUNTER — APPOINTMENT (OUTPATIENT)
Dept: CARDIOLOGY | Facility: CLINIC | Age: 75
End: 2025-06-04

## 2025-07-03 ENCOUNTER — APPOINTMENT (OUTPATIENT)
Dept: CARDIOLOGY | Facility: CLINIC | Age: 75
End: 2025-07-03